# Patient Record
Sex: MALE | Race: WHITE | Employment: FULL TIME | URBAN - METROPOLITAN AREA
[De-identification: names, ages, dates, MRNs, and addresses within clinical notes are randomized per-mention and may not be internally consistent; named-entity substitution may affect disease eponyms.]

---

## 2020-05-08 ENCOUNTER — HOSPITAL ENCOUNTER (OUTPATIENT)
Age: 60
Discharge: HOME | End: 2020-05-10
Payer: COMMERCIAL

## 2020-05-08 LAB
ALBUMIN SERPL-MCNC: 4.6 G/DL (ref 3.5–5.2)
ALP BLD-CCNC: 78 U/L (ref 40–129)
ALT SERPL-CCNC: 15 U/L (ref 0–40)
ANION GAP SERPL CALCULATED.3IONS-SCNC: 12 MMOL/L (ref 7–16)
AST SERPL-CCNC: 23 U/L (ref 0–39)
BASOPHILS ABSOLUTE: 0.06 E9/L (ref 0–0.2)
BASOPHILS RELATIVE PERCENT: 1.3 % (ref 0–2)
BILIRUB SERPL-MCNC: 0.5 MG/DL (ref 0–1.2)
BUN BLDV-MCNC: 13 MG/DL (ref 6–20)
CALCIUM SERPL-MCNC: 9.6 MG/DL (ref 8.6–10.2)
CHLORIDE BLD-SCNC: 98 MMOL/L (ref 98–107)
CHOLESTEROL, TOTAL: 199 MG/DL (ref 0–199)
CO2: 28 MMOL/L (ref 22–29)
CREAT SERPL-MCNC: 1 MG/DL (ref 0.7–1.2)
EOSINOPHILS ABSOLUTE: 0.22 E9/L (ref 0.05–0.5)
EOSINOPHILS RELATIVE PERCENT: 4.7 % (ref 0–6)
GFR AFRICAN AMERICAN: >60
GFR NON-AFRICAN AMERICAN: >60 ML/MIN/1.73
GLUCOSE BLD-MCNC: 85 MG/DL (ref 74–99)
HCT VFR BLD CALC: 43.9 % (ref 37–54)
HDLC SERPL-MCNC: 93 MG/DL
HEMOGLOBIN: 14.5 G/DL (ref 12.5–16.5)
IMMATURE GRANULOCYTES #: 0 E9/L
IMMATURE GRANULOCYTES %: 0 % (ref 0–5)
LDL CHOLESTEROL CALCULATED: 88 MG/DL (ref 0–99)
LYMPHOCYTES ABSOLUTE: 1.31 E9/L (ref 1.5–4)
LYMPHOCYTES RELATIVE PERCENT: 28.2 % (ref 20–42)
MCH RBC QN AUTO: 31.7 PG (ref 26–35)
MCHC RBC AUTO-ENTMCNC: 33 % (ref 32–34.5)
MCV RBC AUTO: 95.9 FL (ref 80–99.9)
MONOCYTES ABSOLUTE: 0.55 E9/L (ref 0.1–0.95)
MONOCYTES RELATIVE PERCENT: 11.9 % (ref 2–12)
NEUTROPHILS ABSOLUTE: 2.5 E9/L (ref 1.8–7.3)
NEUTROPHILS RELATIVE PERCENT: 53.9 % (ref 43–80)
PDW BLD-RTO: 12.3 FL (ref 11.5–15)
PLATELET # BLD: 279 E9/L (ref 130–450)
PMV BLD AUTO: 11.5 FL (ref 7–12)
POTASSIUM SERPL-SCNC: 4.5 MMOL/L (ref 3.5–5)
PROSTATE SPECIFIC ANTIGEN: 2.03 NG/ML (ref 0–4)
RBC # BLD: 4.58 E12/L (ref 3.8–5.8)
SODIUM BLD-SCNC: 138 MMOL/L (ref 132–146)
TOTAL PROTEIN: 7 G/DL (ref 6.4–8.3)
TRIGL SERPL-MCNC: 88 MG/DL (ref 0–149)
TSH SERPL DL<=0.05 MIU/L-ACNC: 3.54 UIU/ML (ref 0.27–4.2)
VLDLC SERPL CALC-MCNC: 18 MG/DL
WBC # BLD: 4.6 E9/L (ref 4.5–11.5)

## 2020-05-08 PROCEDURE — 84443 ASSAY THYROID STIM HORMONE: CPT

## 2020-05-08 PROCEDURE — 80061 LIPID PANEL: CPT

## 2020-05-08 PROCEDURE — G0103 PSA SCREENING: HCPCS

## 2020-05-08 PROCEDURE — 85025 COMPLETE CBC W/AUTO DIFF WBC: CPT

## 2020-05-08 PROCEDURE — 80053 COMPREHEN METABOLIC PANEL: CPT

## 2020-10-12 ENCOUNTER — HOSPITAL ENCOUNTER (OUTPATIENT)
Age: 60
Discharge: HOME | End: 2020-10-14
Payer: COMMERCIAL

## 2020-10-12 LAB
ALBUMIN SERPL-MCNC: 4.4 G/DL (ref 3.5–5.2)
ALP BLD-CCNC: 69 U/L (ref 40–129)
ALT SERPL-CCNC: 19 U/L (ref 0–40)
ANION GAP SERPL CALCULATED.3IONS-SCNC: 11 MMOL/L (ref 7–16)
AST SERPL-CCNC: 26 U/L (ref 0–39)
BASOPHILS ABSOLUTE: 0.05 E9/L (ref 0–0.2)
BASOPHILS RELATIVE PERCENT: 1 % (ref 0–2)
BILIRUB SERPL-MCNC: 0.4 MG/DL (ref 0–1.2)
BUN BLDV-MCNC: 19 MG/DL (ref 6–20)
CALCIUM SERPL-MCNC: 9.4 MG/DL (ref 8.6–10.2)
CHLORIDE BLD-SCNC: 102 MMOL/L (ref 98–107)
CHOLESTEROL, TOTAL: 219 MG/DL (ref 0–199)
CO2: 27 MMOL/L (ref 22–29)
CREAT SERPL-MCNC: 0.9 MG/DL (ref 0.7–1.2)
EOSINOPHILS ABSOLUTE: 0.09 E9/L (ref 0.05–0.5)
EOSINOPHILS RELATIVE PERCENT: 1.8 % (ref 0–6)
GFR AFRICAN AMERICAN: >60
GFR NON-AFRICAN AMERICAN: >60 ML/MIN/1.73
GLUCOSE BLD-MCNC: 96 MG/DL (ref 74–99)
HCT VFR BLD CALC: 42.4 % (ref 37–54)
HDLC SERPL-MCNC: 92 MG/DL
HEMOGLOBIN: 14.2 G/DL (ref 12.5–16.5)
IMMATURE GRANULOCYTES #: 0.01 E9/L
IMMATURE GRANULOCYTES %: 0.2 % (ref 0–5)
LDL CHOLESTEROL CALCULATED: 110 MG/DL (ref 0–99)
LYMPHOCYTES ABSOLUTE: 1.43 E9/L (ref 1.5–4)
LYMPHOCYTES RELATIVE PERCENT: 28.3 % (ref 20–42)
MCH RBC QN AUTO: 31.5 PG (ref 26–35)
MCHC RBC AUTO-ENTMCNC: 33.5 % (ref 32–34.5)
MCV RBC AUTO: 94 FL (ref 80–99.9)
MONOCYTES ABSOLUTE: 0.58 E9/L (ref 0.1–0.95)
MONOCYTES RELATIVE PERCENT: 11.5 % (ref 2–12)
NEUTROPHILS ABSOLUTE: 2.89 E9/L (ref 1.8–7.3)
NEUTROPHILS RELATIVE PERCENT: 57.2 % (ref 43–80)
PDW BLD-RTO: 12.2 FL (ref 11.5–15)
PLATELET # BLD: 247 E9/L (ref 130–450)
PMV BLD AUTO: 10.7 FL (ref 7–12)
POTASSIUM SERPL-SCNC: 5.4 MMOL/L (ref 3.5–5)
PROSTATE SPECIFIC ANTIGEN: 2.45 NG/ML (ref 0–4)
RBC # BLD: 4.51 E12/L (ref 3.8–5.8)
SODIUM BLD-SCNC: 140 MMOL/L (ref 132–146)
TOTAL PROTEIN: 6.7 G/DL (ref 6.4–8.3)
TRIGL SERPL-MCNC: 86 MG/DL (ref 0–149)
TSH SERPL DL<=0.05 MIU/L-ACNC: 2.85 UIU/ML (ref 0.27–4.2)
VLDLC SERPL CALC-MCNC: 17 MG/DL
WBC # BLD: 5.1 E9/L (ref 4.5–11.5)

## 2020-10-12 PROCEDURE — 80061 LIPID PANEL: CPT

## 2020-10-12 PROCEDURE — 85025 COMPLETE CBC W/AUTO DIFF WBC: CPT

## 2020-10-12 PROCEDURE — 84443 ASSAY THYROID STIM HORMONE: CPT

## 2020-10-12 PROCEDURE — G0103 PSA SCREENING: HCPCS

## 2020-10-12 PROCEDURE — 80053 COMPREHEN METABOLIC PANEL: CPT

## 2021-03-17 ENCOUNTER — HOSPITAL ENCOUNTER (EMERGENCY)
Age: 61
Discharge: HOME | End: 2021-03-18
Attending: EMERGENCY MEDICINE
Payer: OTHER GOVERNMENT

## 2021-03-17 DIAGNOSIS — G50.0 TRIGEMINAL NEURALGIA: Primary | ICD-10-CM

## 2021-03-17 PROCEDURE — 99283 EMERGENCY DEPT VISIT LOW MDM: CPT

## 2021-03-17 RX ORDER — CARBAMAZEPINE 300 MG/1
300 CAPSULE, EXTENDED RELEASE ORAL 2 TIMES DAILY
COMMUNITY

## 2021-03-17 RX ORDER — CARBAMAZEPINE 300 MG/1
600 CAPSULE, EXTENDED RELEASE ORAL NIGHTLY
COMMUNITY

## 2021-03-17 RX ORDER — METHYLPREDNISOLONE 4 MG/1
4 TABLET ORAL SEE ADMIN INSTRUCTIONS
COMMUNITY
End: 2021-12-06 | Stop reason: ALTCHOICE

## 2021-03-17 RX ORDER — LEVETIRACETAM 500 MG/1
500 TABLET ORAL 2 TIMES DAILY
COMMUNITY

## 2021-03-18 VITALS
RESPIRATION RATE: 19 BRPM | OXYGEN SATURATION: 99 % | WEIGHT: 160 LBS | HEART RATE: 63 BPM | BODY MASS INDEX: 25.11 KG/M2 | TEMPERATURE: 98 F | HEIGHT: 67 IN | DIASTOLIC BLOOD PRESSURE: 82 MMHG | SYSTOLIC BLOOD PRESSURE: 150 MMHG

## 2021-03-18 PROCEDURE — 96372 THER/PROPH/DIAG INJ SC/IM: CPT

## 2021-03-18 PROCEDURE — 6360000002 HC RX W HCPCS: Performed by: EMERGENCY MEDICINE

## 2021-03-18 PROCEDURE — 6370000000 HC RX 637 (ALT 250 FOR IP): Performed by: EMERGENCY MEDICINE

## 2021-03-18 RX ORDER — OXYCODONE HYDROCHLORIDE AND ACETAMINOPHEN 5; 325 MG/1; MG/1
1 TABLET ORAL ONCE
Status: COMPLETED | OUTPATIENT
Start: 2021-03-18 | End: 2021-03-18

## 2021-03-18 RX ORDER — OXYCODONE HYDROCHLORIDE AND ACETAMINOPHEN 5; 325 MG/1; MG/1
1 TABLET ORAL EVERY 6 HOURS PRN
Qty: 10 TABLET | Refills: 0 | Status: SHIPPED | OUTPATIENT
Start: 2021-03-18 | End: 2021-03-25

## 2021-03-18 RX ORDER — DEXAMETHASONE SODIUM PHOSPHATE 10 MG/ML
10 INJECTION, SOLUTION INTRAMUSCULAR; INTRAVENOUS ONCE
Status: COMPLETED | OUTPATIENT
Start: 2021-03-18 | End: 2021-03-18

## 2021-03-18 RX ADMIN — DEXAMETHASONE SODIUM PHOSPHATE 10 MG: 10 INJECTION, SOLUTION INTRAMUSCULAR; INTRAVENOUS at 01:04

## 2021-03-18 RX ADMIN — OXYCODONE AND ACETAMINOPHEN 1 TABLET: 5; 325 TABLET ORAL at 01:06

## 2021-03-18 ASSESSMENT — ENCOUNTER SYMPTOMS
SORE THROAT: 0
VOMITING: 0
COUGH: 0
ABDOMINAL PAIN: 0
BACK PAIN: 0
EYE PAIN: 0
EYE DISCHARGE: 0
DIARRHEA: 0
SINUS PRESSURE: 0
NAUSEA: 0
WHEEZING: 0
EYE REDNESS: 0
SHORTNESS OF BREATH: 0

## 2021-03-18 ASSESSMENT — PAIN SCALES - GENERAL
PAINLEVEL_OUTOF10: 7
PAINLEVEL_OUTOF10: 8

## 2021-03-18 NOTE — ED PROVIDER NOTES
Chronic left sided trigeninal neuralgia pain no chest pain no sob no fevers no vomiting no neuro deficits. See neurologist just increased pain today    The history is provided by the patient. Review of Systems   Constitutional: Negative for chills and fever. HENT: Negative for ear pain, sinus pressure and sore throat. Eyes: Negative for pain, discharge and redness. Respiratory: Negative for cough, shortness of breath and wheezing. Cardiovascular: Negative for chest pain. Gastrointestinal: Negative for abdominal pain, diarrhea, nausea and vomiting. Genitourinary: Negative for dysuria and frequency. Musculoskeletal: Negative for arthralgias and back pain. Skin: Negative for rash and wound. Neurological: Negative for weakness and headaches. Left sided facial tenderness    Hematological: Negative for adenopathy. All other systems reviewed and are negative. Physical Exam  Vitals signs and nursing note reviewed. Constitutional:       Appearance: He is well-developed. HENT:      Head: Normocephalic and atraumatic. Jaw: No trismus. Right Ear: Hearing, tympanic membrane, ear canal and external ear normal.      Left Ear: Hearing, tympanic membrane, ear canal and external ear normal.      Nose: Nose normal.      Right Sinus: No maxillary sinus tenderness or frontal sinus tenderness. Left Sinus: No maxillary sinus tenderness or frontal sinus tenderness. Mouth/Throat:      Pharynx: Uvula midline. No uvula swelling. Eyes:      General: Lids are normal.      Conjunctiva/sclera: Conjunctivae normal.      Pupils: Pupils are equal, round, and reactive to light. Neck:      Musculoskeletal: Normal range of motion and neck supple. Cardiovascular:      Rate and Rhythm: Normal rate and regular rhythm. Heart sounds: Normal heart sounds. No murmur. Pulmonary:      Effort: Pulmonary effort is normal.      Breath sounds: Normal breath sounds.    Abdominal: General: Bowel sounds are normal.      Palpations: Abdomen is soft. Abdomen is not rigid. Tenderness: There is no abdominal tenderness. There is no guarding or rebound. Skin:     General: Skin is warm and dry. Findings: No abrasion or rash. Neurological:      Mental Status: He is alert and oriented to person, place, and time. GCS: GCS eye subscore is 4. GCS verbal subscore is 5. GCS motor subscore is 6. Cranial Nerves: No cranial nerve deficit. Sensory: No sensory deficit. Coordination: Coordination normal.      Gait: Gait normal.          Procedures     MDM         --------------------------------------------- PAST HISTORY ---------------------------------------------  Past Medical History:  has no past medical history on file. Past Surgical History:  has no past surgical history on file. Social History:  reports that he has never smoked. He has never used smokeless tobacco. He reports current alcohol use. He reports that he does not use drugs. Family History: family history is not on file. The patients home medications have been reviewed. Allergies: Patient has no known allergies. -------------------------------------------------- RESULTS -------------------------------------------------  No results found for this visit on 03/17/21. No orders to display       ------------------------- NURSING NOTES AND VITALS REVIEWED ---------------------------   The nursing notes within the ED encounter and vital signs as below have been reviewed.    BP (!) 143/73   Pulse 56   Temp 98.5 °F (36.9 °C) (Oral)   Resp 17   Ht 5' 7\" (1.702 m)   Wt 160 lb (72.6 kg)   SpO2 99%   BMI 25.06 kg/m²   Oxygen Saturation Interpretation: Normal      ------------------------------------------ PROGRESS NOTES ------------------------------------------   I have spoken with the patient and discussed todays results, in addition to providing specific details for the plan of care and counseling regarding the diagnosis and prognosis. Their questions are answered at this time and they are agreeable with the plan.      --------------------------------- ADDITIONAL PROVIDER NOTES ---------------------------------          This patient is stable for discharge. I have shared the specific conditions for return, as well as the importance of follow-up.           --------------------------------- IMPRESSION AND DISPOSITION ---------------------------------    IMPRESSION  1.  Trigeminal neuralgia Stable       DISPOSITION  Disposition: Discharge to home  Patient condition is stable              Brenna Camacho MD  03/18/21 [id number]

## 2021-03-18 NOTE — ED NOTES
Discharge instructions and medications reviewed, patient verbalized understanding     Epifanio Galeano RN  03/18/21 [id number]

## 2021-11-24 ENCOUNTER — HOSPITAL ENCOUNTER (OUTPATIENT)
Dept: CT IMAGING | Age: 61
Discharge: HOME | End: 2021-11-24
Payer: OTHER GOVERNMENT

## 2021-11-24 DIAGNOSIS — R59.0 ENLARGED LYMPH NODES IN ARMPIT: ICD-10-CM

## 2021-11-24 PROCEDURE — 6360000004 HC RX CONTRAST MEDICATION: Performed by: RADIOLOGY

## 2021-11-24 PROCEDURE — 71260 CT THORAX DX C+: CPT

## 2021-11-24 RX ADMIN — IOPAMIDOL 75 ML: 755 INJECTION, SOLUTION INTRAVENOUS at 12:04

## 2021-12-06 ENCOUNTER — OFFICE VISIT (OUTPATIENT)
Dept: SURGERY | Age: 61
End: 2021-12-06
Payer: OTHER GOVERNMENT

## 2021-12-06 ENCOUNTER — TELEPHONE (OUTPATIENT)
Dept: SURGERY | Age: 61
End: 2021-12-06

## 2021-12-06 VITALS
BODY MASS INDEX: 25.01 KG/M2 | WEIGHT: 165 LBS | HEART RATE: 64 BPM | DIASTOLIC BLOOD PRESSURE: 78 MMHG | HEIGHT: 68 IN | SYSTOLIC BLOOD PRESSURE: 129 MMHG | TEMPERATURE: 98.1 F

## 2021-12-06 DIAGNOSIS — R59.0 AXILLARY LYMPHADENOPATHY: Primary | ICD-10-CM

## 2021-12-06 PROCEDURE — 99204 OFFICE O/P NEW MOD 45 MIN: CPT | Performed by: SURGERY

## 2021-12-06 RX ORDER — RIMEGEPANT SULFATE 75 MG/75MG
75 TABLET, ORALLY DISINTEGRATING ORAL DAILY PRN
COMMUNITY

## 2021-12-06 RX ORDER — ERENUMAB-AOOE 140 MG/ML
INJECTION, SOLUTION SUBCUTANEOUS
COMMUNITY
Start: 2021-08-31

## 2021-12-06 RX ORDER — LORAZEPAM 0.5 MG/1
TABLET ORAL
COMMUNITY

## 2021-12-06 RX ORDER — SODIUM CHLORIDE 0.9 % (FLUSH) 0.9 %
10 SYRINGE (ML) INJECTION PRN
Status: CANCELLED | OUTPATIENT
Start: 2021-12-06

## 2021-12-06 RX ORDER — ROSUVASTATIN CALCIUM 5 MG/1
TABLET, COATED ORAL
COMMUNITY
Start: 2021-11-06

## 2021-12-06 RX ORDER — SODIUM CHLORIDE 9 MG/ML
25 INJECTION, SOLUTION INTRAVENOUS PRN
Status: CANCELLED | OUTPATIENT
Start: 2021-12-06

## 2021-12-06 RX ORDER — SODIUM CHLORIDE 0.9 % (FLUSH) 0.9 %
10 SYRINGE (ML) INJECTION EVERY 12 HOURS SCHEDULED
Status: CANCELLED | OUTPATIENT
Start: 2021-12-06

## 2021-12-06 NOTE — TELEPHONE ENCOUNTER
Prior Authorization Form:      DEMOGRAPHICS:                     Patient Name:  Penny Tinajero  Patient :  1960            Insurance:  Payor: Obey Jackson / Plan: [employer] [address] / Product Type: *No Product type* /   Insurance ID Number:    Payor/Plan Subscr  Sex Relation Sub. Ins. ID Effective Group Num   1.  13 [facility] 1960 Male Self [id number] 3/17/21 [id number]                                   [id number]         DIAGNOSIS & PROCEDURE:                       Procedure/Operation: Excision left axillary lymph node            CPT Code: 07009    Diagnosis:  Axillary lymphadenopathy     ICD10 Code: R59.0    Location:  [facility]    Surgeon:  Lara Hinds INFORMATION:                          Date: 21    Time: TBD              Anesthesia:  MAC/TIVA                                                       Status:  Outpatient        Special Comments:  Lymphoma Protocol        Electronically signed by Yaya Berry RN on 2021 at 11:33 AM

## 2021-12-06 NOTE — PROGRESS NOTES
General Surgery History and Physical  [facility]    Patient's Name/Date of Birth: Rogerio Valdovinos / 1960    Date: December 6, 2021     Surgeon: Isabelle Guerin M.D.    PCP: Cyndee Tomas MD     Chief Complaint: lymphadenopath    HPI:   Rogerio Valdovinos is a 62 y.o. male who presents for evaluation of soft tissue neoplasm of the left axilla. Timing is constant, radiation to left axilla, alleviated by none and started 6 months ago and severity is 7/10. NO drainage, no pain. Denies similar in the past. No fever, chills. Denies previous at the same site. Would like to have removed. Patient had CT scan which showed significant lymphadenopathy in the left axilla of 3 separate lymph nodes measuring from 11 up to 27 mm. Also with a 9 mm left thyroid lesion. Denies any B symptoms denies any unintentional weight loss. Denies any family history of leukemia lymphoma. Patient with a history of trigeminal neuralgia as well as shingles in the left side about 6 months ago when he first identified the lesion. Shingles had resolved and not having any resolution of the lymph node enlargement. There is no problem list on file for this patient. History reviewed. No pertinent past medical history. History reviewed. No pertinent surgical history. No Known Allergies    The patient has a family history that is negative for severe cardiovascular or respiratory issues, negative for reaction to anesthesia. Time spent reviewing past medical, surgical, social and family history, vitals, nursing assessment and images. No changes from above documented history.     Social History     Socioeconomic History    Marital status:      Spouse name: Not on file    Number of children: Not on file    Years of education: Not on file    Highest education level: Not on file   Occupational History    Not on file   Tobacco Use    Smoking status: Never Smoker    Smokeless tobacco: Never Used Substance and Sexual Activity    Alcohol use: Yes     Comment: Occ.  Drug use: Never    Sexual activity: Not on file   Other Topics Concern    Not on file   Social History Narrative    Not on file     Social Determinants of Health     Financial Resource Strain:     Difficulty of Paying Living Expenses: Not on file   Food Insecurity:     Worried About Running Out of Food in the Last Year: Not on file    Nir of Food in the Last Year: Not on file   Transportation Needs:     Lack of Transportation (Medical): Not on file    Lack of Transportation (Non-Medical): Not on file   Physical Activity:     Days of Exercise per Week: Not on file    Minutes of Exercise per Session: Not on file   Stress:     Feeling of Stress : Not on file   Social Connections:     Frequency of Communication with Friends and Family: Not on file    Frequency of Social Gatherings with Friends and Family: Not on file    Attends Protestant Services: Not on file    Active Member of [address] [place] [address] or Organizations: Not on file    Attends Club or Organization Meetings: Not on file    Marital Status: Not on file   Intimate Partner Violence:     Fear of Current or Ex-Partner: Not on file    Emotionally Abused: Not on file    Physically Abused: Not on file    Sexually Abused: Not on file   Housing Stability:     Unable to Pay for Housing in the Last Year: Not on file    Number of [place] in the Last Year: Not on file    Unstable Housing in the Last Year: Not on file           Review of Systems  A complete 10 system review was performed and are otherwise negative unless mentioned in the above HPI. Specific negatives are listed below but may not include all those reviewed.     General ROS: negative obtundation, AMS  ENT ROS: negative rhinorrhea, epistaxis  Allergy and Immunology ROS: negative itchy/watery eyes or nasal congestion  Hematological and Lymphatic ROS: negative spontaneous bleeding or bruising  Endocrine ROS: negative lethargy, mood swings, palpitations or polydipsia/polyuria  Respiratory ROS: negative sputum changes, stridor, tachypnea or wheezing  Cardiovascular ROS: negative for - loss of consciousness, murmur or orthopnea  Gastrointestinal ROS: negative for - hematochezia or hematemesis  Genito-Urinary ROS: negative for -  genital discharge or hematuria  Musculoskeletal ROS: negative for - focal weakness, gangrene  Psych/Neuro ROS: negative for - visual or auditory hallucinations, suicidal ideation    Physical exam:   /78   Pulse 64   Temp 98.1 °F (36.7 °C) (Temporal)   Ht 5' 8\" (1.727 m)   Wt 165 lb (74.8 kg)   BMI 25.09 kg/m²   General appearance:  NAD, appears stated age  Head: NCAT, PERRLA, EOMI, red conjunctiva  Neck: supple, no masses, trachea midline  Lungs: Equal chest rise bilateral, no retractions, no wheezing  Heart: Reg rate  Abdomen: soft, nontender  Skin; soft tissue mass 4 cm located left axilla, mobile, none tender, no drainage  Gu: no cva tenderness  Extremities: atraumatic, no focal motor deficits, no open wounds  Psych: No tremor, visual hallucinations      Radiology: I reviewed relevant abdominal imaging from this admission and that available in the EMR including CT chest  Impression   Enlarged left axillary lymph nodes.  Malignancy like lymphoma or leukemia is   a consideration.  Tissue sampling or PET-CT scan is recommended.       Scarring and apical pleural thickening and pleural plaques bilaterally.       3 mm subpleural nodule in the right upper lobe.  Consider surveillance   according to Fleischner society guidelines.       9 mm left thyroid nodule.  Consider ultrasonographic surveillance. Assessment:  Cynthia Cervantes is a 62 y.o. male with axillary lymphadenopathy on the left concerns for malignancy, pain at the site with swelling  There is no problem list on file for this patient.         Plan:  OR for excision left axillary lymph node  Discussed the risk, benefits and alternatives of surgery including wound infections, bleeding, scar, seroma, hematoma and recurrence of the mass or similar in other locations and the risks of general anesthetic including MI, CVA, sudden death or reactions to anesthetic medications. The patient understands the risks and alternatives and the possibility of converting to an open procedure. All questions were answered to the patient's satisfaction and they freely signed the consent.        Merlin Burkitt, MD  10:01 AM  12/6/2021

## 2021-12-09 RX ORDER — SODIUM CHLORIDE, SODIUM LACTATE, POTASSIUM CHLORIDE, CALCIUM CHLORIDE 600; 310; 30; 20 MG/100ML; MG/100ML; MG/100ML; MG/100ML
INJECTION, SOLUTION INTRAVENOUS CONTINUOUS
Status: CANCELLED | OUTPATIENT
Start: 2021-12-09

## 2021-12-10 ENCOUNTER — HOSPITAL ENCOUNTER (OUTPATIENT)
Dept: PREADMISSION TESTING | Age: 61
Discharge: HOME | End: 2021-12-10
Payer: OTHER GOVERNMENT

## 2021-12-10 VITALS
SYSTOLIC BLOOD PRESSURE: 125 MMHG | HEIGHT: 68 IN | BODY MASS INDEX: 25.01 KG/M2 | OXYGEN SATURATION: 100 % | WEIGHT: 165 LBS | HEART RATE: 58 BPM | DIASTOLIC BLOOD PRESSURE: 80 MMHG | TEMPERATURE: 97.4 F | RESPIRATION RATE: 16 BRPM

## 2021-12-10 DIAGNOSIS — Z01.818 PRE-OP TESTING: Primary | ICD-10-CM

## 2021-12-10 LAB
EKG ATRIAL RATE: 54 BPM
EKG P AXIS: 74 DEGREES
EKG P-R INTERVAL: 168 MS
EKG Q-T INTERVAL: 412 MS
EKG QRS DURATION: 96 MS
EKG QTC CALCULATION (BAZETT): 390 MS
EKG R AXIS: 30 DEGREES
EKG T AXIS: 49 DEGREES
EKG VENTRICULAR RATE: 54 BPM
HCT VFR BLD CALC: 42.8 % (ref 37–54)
HEMOGLOBIN: 14.8 G/DL (ref 12.5–16.5)
MCH RBC QN AUTO: 32.1 PG (ref 26–35)
MCHC RBC AUTO-ENTMCNC: 34.6 % (ref 32–34.5)
MCV RBC AUTO: 92.8 FL (ref 80–99.9)
PDW BLD-RTO: 11.7 FL (ref 11.5–15)
PLATELET # BLD: 231 E9/L (ref 130–450)
PMV BLD AUTO: 10.5 FL (ref 7–12)
RBC # BLD: 4.61 E12/L (ref 3.8–5.8)
WBC # BLD: 4.8 E9/L (ref 4.5–11.5)

## 2021-12-10 PROCEDURE — 85027 COMPLETE CBC AUTOMATED: CPT

## 2021-12-10 PROCEDURE — 93005 ELECTROCARDIOGRAM TRACING: CPT | Performed by: ANESTHESIOLOGY

## 2021-12-10 PROCEDURE — 36415 COLL VENOUS BLD VENIPUNCTURE: CPT

## 2021-12-10 NOTE — PROGRESS NOTES
[facility]                                                                                                                    PRE OP INSTRUCTIONS FOR  Nazia Anderson        Date: 12/10/2021    Date of surgery: 12/13/21   Arrival Time: Hospital will call you between 5pm and 7pm with your final arrival time for surgery    1. Do not eat or drink anything after midnight prior to surgery. This includes no water, chewing gum, mints or ice chips. 2. Take the following medications with a small sip of water on the morning of Surgery: carbatral keprra       3. Aspirin, Ibuprofen, Advil, Naproxen, Vitamin E and other Anti-inflammatory products should be stopped  before surgery  as directed by your physician. Take Tylenol only unless instructed otherwise by your surgeon. 4. Do not smoke,use illicit drugs and do not drink any alcoholic beverages 24 hours prior to surgery. 5. You may brush your teeth the morning of surgery. DO NOT SWALLOW WATER    6. You MUST make arrangements for a responsible adult to take you home after your surgery. You will not be allowed to leave alone or drive yourself home. It is strongly suggested someone stay with you the first 24 hrs. Your surgery will be cancelled if you do not have a ride home. 7. Please wear simple, loose fitting clothing to the hospital.  Elena Blanc not bring valuables (money, credit cards, checkbooks, etc.) Do not wear any makeup (including no eye makeup) or nail polish on your fingers or toes. 8. DO NOT wear any jewelry or piercings on day of surgery. All body piercing jewelry must be removed. 9. Shower the night before surgery with _x__Antibacterial soap /LUCIO WIPES________      10.  Notify your Surgeon if you develop any illness between now and surgery time, cough, cold, fever, sore throat, nausea, vomiting, etc.  Please notify your surgeon if you experience dizziness, shortness of breath or blurred vision between now & the time of

## 2021-12-12 ENCOUNTER — ANESTHESIA EVENT (OUTPATIENT)
Dept: OPERATING ROOM | Age: 61
End: 2021-12-12
Payer: OTHER GOVERNMENT

## 2021-12-13 ENCOUNTER — ANESTHESIA (OUTPATIENT)
Dept: OPERATING ROOM | Age: 61
End: 2021-12-13
Payer: OTHER GOVERNMENT

## 2021-12-13 ENCOUNTER — HOSPITAL ENCOUNTER (OUTPATIENT)
Age: 61
Setting detail: OUTPATIENT SURGERY
Discharge: HOME | End: 2021-12-13
Attending: SURGERY | Admitting: SURGERY
Payer: OTHER GOVERNMENT

## 2021-12-13 VITALS
RESPIRATION RATE: 16 BRPM | OXYGEN SATURATION: 98 % | DIASTOLIC BLOOD PRESSURE: 82 MMHG | TEMPERATURE: 96.1 F | HEART RATE: 55 BPM | SYSTOLIC BLOOD PRESSURE: 125 MMHG

## 2021-12-13 VITALS
DIASTOLIC BLOOD PRESSURE: 83 MMHG | SYSTOLIC BLOOD PRESSURE: 117 MMHG | RESPIRATION RATE: 16 BRPM | OXYGEN SATURATION: 99 %

## 2021-12-13 DIAGNOSIS — G89.18 POSTOPERATIVE PAIN: Primary | ICD-10-CM

## 2021-12-13 PROCEDURE — 88305 TISSUE EXAM BY PATHOLOGIST: CPT

## 2021-12-13 PROCEDURE — 7100000011 HC PHASE II RECOVERY - ADDTL 15 MIN: Performed by: SURGERY

## 2021-12-13 PROCEDURE — 2720000010 HC SURG SUPPLY STERILE: Performed by: SURGERY

## 2021-12-13 PROCEDURE — 2500000003 HC RX 250 WO HCPCS

## 2021-12-13 PROCEDURE — 2580000003 HC RX 258

## 2021-12-13 PROCEDURE — 6360000002 HC RX W HCPCS: Performed by: SURGERY

## 2021-12-13 PROCEDURE — 6360000002 HC RX W HCPCS

## 2021-12-13 PROCEDURE — 3600000012 HC SURGERY LEVEL 2 ADDTL 15MIN: Performed by: SURGERY

## 2021-12-13 PROCEDURE — 88342 IMHCHEM/IMCYTCHM 1ST ANTB: CPT

## 2021-12-13 PROCEDURE — 88184 FLOWCYTOMETRY/ TC 1 MARKER: CPT

## 2021-12-13 PROCEDURE — 2500000003 HC RX 250 WO HCPCS: Performed by: SURGERY

## 2021-12-13 PROCEDURE — 7100000010 HC PHASE II RECOVERY - FIRST 15 MIN: Performed by: SURGERY

## 2021-12-13 PROCEDURE — 3700000000 HC ANESTHESIA ATTENDED CARE: Performed by: SURGERY

## 2021-12-13 PROCEDURE — 38525 BIOPSY/REMOVAL LYMPH NODES: CPT | Performed by: SURGERY

## 2021-12-13 PROCEDURE — 88185 FLOWCYTOMETRY/TC ADD-ON: CPT

## 2021-12-13 PROCEDURE — 3700000001 HC ADD 15 MINUTES (ANESTHESIA): Performed by: SURGERY

## 2021-12-13 PROCEDURE — 88341 IMHCHEM/IMCYTCHM EA ADD ANTB: CPT

## 2021-12-13 PROCEDURE — 2709999900 HC NON-CHARGEABLE SUPPLY: Performed by: SURGERY

## 2021-12-13 PROCEDURE — 3600000002 HC SURGERY LEVEL 2 BASE: Performed by: SURGERY

## 2021-12-13 PROCEDURE — 88360 TUMOR IMMUNOHISTOCHEM/MANUAL: CPT

## 2021-12-13 PROCEDURE — 2580000003 HC RX 258: Performed by: ANESTHESIOLOGY

## 2021-12-13 RX ORDER — IBUPROFEN 800 MG/1
800 TABLET ORAL EVERY 6 HOURS PRN
Qty: 20 TABLET | Refills: 0 | Status: SHIPPED | OUTPATIENT
Start: 2021-12-13 | End: 2022-01-24

## 2021-12-13 RX ORDER — FENTANYL CITRATE 50 UG/ML
INJECTION, SOLUTION INTRAMUSCULAR; INTRAVENOUS PRN
Status: DISCONTINUED | OUTPATIENT
Start: 2021-12-13 | End: 2021-12-13 | Stop reason: SDUPTHER

## 2021-12-13 RX ORDER — PROPOFOL 10 MG/ML
INJECTION, EMULSION INTRAVENOUS PRN
Status: DISCONTINUED | OUTPATIENT
Start: 2021-12-13 | End: 2021-12-13 | Stop reason: SDUPTHER

## 2021-12-13 RX ORDER — LIDOCAINE HYDROCHLORIDE 10 MG/ML
INJECTION, SOLUTION INFILTRATION; PERINEURAL PRN
Status: DISCONTINUED | OUTPATIENT
Start: 2021-12-13 | End: 2021-12-13 | Stop reason: ALTCHOICE

## 2021-12-13 RX ORDER — LABETALOL HYDROCHLORIDE 5 MG/ML
5 INJECTION, SOLUTION INTRAVENOUS EVERY 10 MIN PRN
Status: DISCONTINUED | OUTPATIENT
Start: 2021-12-13 | End: 2021-12-13 | Stop reason: HOSPADM

## 2021-12-13 RX ORDER — HYDROCODONE BITARTRATE AND ACETAMINOPHEN 5; 325 MG/1; MG/1
1 TABLET ORAL EVERY 6 HOURS PRN
Qty: 10 TABLET | Refills: 0 | Status: SHIPPED | OUTPATIENT
Start: 2021-12-13 | End: 2021-12-16

## 2021-12-13 RX ORDER — SODIUM CHLORIDE, SODIUM LACTATE, POTASSIUM CHLORIDE, CALCIUM CHLORIDE 600; 310; 30; 20 MG/100ML; MG/100ML; MG/100ML; MG/100ML
INJECTION, SOLUTION INTRAVENOUS CONTINUOUS
Status: DISCONTINUED | OUTPATIENT
Start: 2021-12-13 | End: 2021-12-13 | Stop reason: HOSPADM

## 2021-12-13 RX ORDER — BUPIVACAINE HYDROCHLORIDE AND EPINEPHRINE 2.5; 5 MG/ML; UG/ML
INJECTION, SOLUTION EPIDURAL; INFILTRATION; INTRACAUDAL; PERINEURAL PRN
Status: DISCONTINUED | OUTPATIENT
Start: 2021-12-13 | End: 2021-12-13 | Stop reason: ALTCHOICE

## 2021-12-13 RX ORDER — LIDOCAINE HYDROCHLORIDE 20 MG/ML
INJECTION, SOLUTION INTRAVENOUS PRN
Status: DISCONTINUED | OUTPATIENT
Start: 2021-12-13 | End: 2021-12-13 | Stop reason: SDUPTHER

## 2021-12-13 RX ORDER — SODIUM CHLORIDE 9 MG/ML
25 INJECTION, SOLUTION INTRAVENOUS PRN
Status: DISCONTINUED | OUTPATIENT
Start: 2021-12-13 | End: 2021-12-13 | Stop reason: HOSPADM

## 2021-12-13 RX ORDER — HYDROCODONE BITARTRATE AND ACETAMINOPHEN 5; 325 MG/1; MG/1
1 TABLET ORAL
Status: DISCONTINUED | OUTPATIENT
Start: 2021-12-13 | End: 2021-12-13 | Stop reason: HOSPADM

## 2021-12-13 RX ORDER — SODIUM CHLORIDE 0.9 % (FLUSH) 0.9 %
10 SYRINGE (ML) INJECTION EVERY 12 HOURS SCHEDULED
Status: DISCONTINUED | OUTPATIENT
Start: 2021-12-13 | End: 2021-12-13 | Stop reason: HOSPADM

## 2021-12-13 RX ORDER — MIDAZOLAM HYDROCHLORIDE 1 MG/ML
INJECTION INTRAMUSCULAR; INTRAVENOUS PRN
Status: DISCONTINUED | OUTPATIENT
Start: 2021-12-13 | End: 2021-12-13 | Stop reason: SDUPTHER

## 2021-12-13 RX ORDER — PROCHLORPERAZINE EDISYLATE 5 MG/ML
5 INJECTION INTRAMUSCULAR; INTRAVENOUS
Status: DISCONTINUED | OUTPATIENT
Start: 2021-12-13 | End: 2021-12-13 | Stop reason: HOSPADM

## 2021-12-13 RX ORDER — SODIUM CHLORIDE, SODIUM LACTATE, POTASSIUM CHLORIDE, CALCIUM CHLORIDE 600; 310; 30; 20 MG/100ML; MG/100ML; MG/100ML; MG/100ML
INJECTION, SOLUTION INTRAVENOUS CONTINUOUS PRN
Status: DISCONTINUED | OUTPATIENT
Start: 2021-12-13 | End: 2021-12-13 | Stop reason: SDUPTHER

## 2021-12-13 RX ORDER — FENTANYL CITRATE 50 UG/ML
25 INJECTION, SOLUTION INTRAMUSCULAR; INTRAVENOUS EVERY 5 MIN PRN
Status: DISCONTINUED | OUTPATIENT
Start: 2021-12-13 | End: 2021-12-13 | Stop reason: HOSPADM

## 2021-12-13 RX ORDER — ONDANSETRON 2 MG/ML
INJECTION INTRAMUSCULAR; INTRAVENOUS PRN
Status: DISCONTINUED | OUTPATIENT
Start: 2021-12-13 | End: 2021-12-13 | Stop reason: SDUPTHER

## 2021-12-13 RX ORDER — KETAMINE HYDROCHLORIDE 50 MG/ML
INJECTION, SOLUTION, CONCENTRATE INTRAMUSCULAR; INTRAVENOUS PRN
Status: DISCONTINUED | OUTPATIENT
Start: 2021-12-13 | End: 2021-12-13 | Stop reason: SDUPTHER

## 2021-12-13 RX ORDER — MEPERIDINE HYDROCHLORIDE 25 MG/ML
12.5 INJECTION INTRAMUSCULAR; INTRAVENOUS; SUBCUTANEOUS EVERY 5 MIN PRN
Status: DISCONTINUED | OUTPATIENT
Start: 2021-12-13 | End: 2021-12-13 | Stop reason: HOSPADM

## 2021-12-13 RX ORDER — DIPHENHYDRAMINE HYDROCHLORIDE 50 MG/ML
12.5 INJECTION INTRAMUSCULAR; INTRAVENOUS
Status: DISCONTINUED | OUTPATIENT
Start: 2021-12-13 | End: 2021-12-13 | Stop reason: HOSPADM

## 2021-12-13 RX ORDER — HYDRALAZINE HYDROCHLORIDE 20 MG/ML
5 INJECTION INTRAMUSCULAR; INTRAVENOUS EVERY 10 MIN PRN
Status: DISCONTINUED | OUTPATIENT
Start: 2021-12-13 | End: 2021-12-13 | Stop reason: HOSPADM

## 2021-12-13 RX ORDER — SODIUM CHLORIDE 0.9 % (FLUSH) 0.9 %
10 SYRINGE (ML) INJECTION PRN
Status: DISCONTINUED | OUTPATIENT
Start: 2021-12-13 | End: 2021-12-13 | Stop reason: HOSPADM

## 2021-12-13 RX ADMIN — PROPOFOL INJECTABLE EMULSION 75 MCG/KG/MIN: 10 INJECTION, EMULSION INTRAVENOUS at 07:02

## 2021-12-13 RX ADMIN — KETAMINE HYDROCHLORIDE 30 MG: 50 INJECTION INTRAMUSCULAR; INTRAVENOUS at 07:01

## 2021-12-13 RX ADMIN — FENTANYL CITRATE 50 MCG: 50 INJECTION, SOLUTION INTRAMUSCULAR; INTRAVENOUS at 07:05

## 2021-12-13 RX ADMIN — KETAMINE HYDROCHLORIDE 10 MG: 50 INJECTION INTRAMUSCULAR; INTRAVENOUS at 07:17

## 2021-12-13 RX ADMIN — ONDANSETRON 4 MG: 2 INJECTION INTRAMUSCULAR; INTRAVENOUS at 07:24

## 2021-12-13 RX ADMIN — SODIUM CHLORIDE, POTASSIUM CHLORIDE, SODIUM LACTATE AND CALCIUM CHLORIDE: 600; 310; 30; 20 INJECTION, SOLUTION INTRAVENOUS at 06:57

## 2021-12-13 RX ADMIN — Medication 2000 MG: at 07:02

## 2021-12-13 RX ADMIN — LIDOCAINE HYDROCHLORIDE 20 MG: 20 INJECTION, SOLUTION INTRAVENOUS at 07:01

## 2021-12-13 RX ADMIN — MIDAZOLAM 1 MG: 1 INJECTION INTRAMUSCULAR; INTRAVENOUS at 06:58

## 2021-12-13 RX ADMIN — PROPOFOL INJECTABLE EMULSION 100 MG: 10 INJECTION, EMULSION INTRAVENOUS at 07:01

## 2021-12-13 RX ADMIN — SODIUM CHLORIDE, POTASSIUM CHLORIDE, SODIUM LACTATE AND CALCIUM CHLORIDE: 600; 310; 30; 20 INJECTION, SOLUTION INTRAVENOUS at 06:00

## 2021-12-13 RX ADMIN — FENTANYL CITRATE 50 MCG: 50 INJECTION, SOLUTION INTRAMUSCULAR; INTRAVENOUS at 07:01

## 2021-12-13 ASSESSMENT — PULMONARY FUNCTION TESTS
PIF_VALUE: 1
PIF_VALUE: 3
PIF_VALUE: 2
PIF_VALUE: 1
PIF_VALUE: 0
PIF_VALUE: 1
PIF_VALUE: 0
PIF_VALUE: 1

## 2021-12-13 ASSESSMENT — PAIN SCALES - GENERAL
PAINLEVEL_OUTOF10: 0
PAINLEVEL_OUTOF10: 0

## 2021-12-13 ASSESSMENT — PAIN - FUNCTIONAL ASSESSMENT: PAIN_FUNCTIONAL_ASSESSMENT: 0-10

## 2021-12-13 ASSESSMENT — LIFESTYLE VARIABLES: SMOKING_STATUS: 0

## 2021-12-13 NOTE — ANESTHESIA POSTPROCEDURE EVALUATION
Department of Anesthesiology  Postprocedure Note    Patient: Sandy Powers  MRN: [id number]  YOB: 1960  Date of evaluation: 12/13/2021  Time:  7:53 AM     Procedure Summary     Date: 12/13/21 Room / Location: [address] [place] [address] / [address] [place] [address]    Anesthesia Start: 1836 Anesthesia Stop: 6313    Procedure: EXCISION LEFT AXILLARY LYMPH NODE  **LYMPHOMA PROTOCOL** (Left Axilla) Diagnosis: (AXILLARY LYMPHADENOPATHY)    Surgeons: Eulalio Moncada MD Responsible Provider: Mariana Tatum DO    Anesthesia Type: MAC ASA Status: 2          Anesthesia Type: MAC    Loly Phase I: Loly Score: 10    Loly Phase II:      Last vitals: Reviewed and per EMR flowsheets.        Anesthesia Post Evaluation    Patient location during evaluation: bedside  Patient participation: complete - patient participated  Level of consciousness: awake  Pain score: 3  Airway patency: patent  Nausea & Vomiting: no vomiting and no nausea  Complications: no  Cardiovascular status: hemodynamically stable  Respiratory status: acceptable  Hydration status: stable

## 2021-12-13 NOTE — H&P
General Surgery History and Physical  [facility]    Patient's Name/Date of Birth: Tip Burleson / 1960    Date: December 13, 2021     Surgeon: Oksana Blank M.D.    PCP: Wilfrido Ybarra MD     Chief Complaint: lymphadenopath    HPI:   Tip Burleson is a 62 y.o. male who presents for evaluation of soft tissue neoplasm of the left axilla. Timing is constant, radiation to left axilla, alleviated by none and started 6 months ago and severity is 7/10. NO drainage, no pain. Denies similar in the past. No fever, chills. Denies previous at the same site. Would like to have removed. Patient had CT scan which showed significant lymphadenopathy in the left axilla of 3 separate lymph nodes measuring from 11 up to 27 mm. Also with a 9 mm left thyroid lesion. Denies any B symptoms denies any unintentional weight loss. Denies any family history of leukemia lymphoma. Patient with a history of trigeminal neuralgia as well as shingles in the left side about 6 months ago when he first identified the lesion. Shingles had resolved and not having any resolution of the lymph node enlargement. There is no problem list on file for this patient. Past Medical History:   Diagnosis Date    Shingles 07/2021    left arm    Trigeminal neuralgia of left side of face 2009       Past Surgical History:   Procedure Laterality Date    TMJ ARTHROSCOPY      VASCULAR SURGERY Left 11/2019    multivascular decompression of trigeminal nerve       Allergies   Allergen Reactions    Cat Hair Extract Other (See Comments)     congestion       The patient has a family history that is negative for severe cardiovascular or respiratory issues, negative for reaction to anesthesia. Time spent reviewing past medical, surgical, social and family history, vitals, nursing assessment and images. No changes from above documented history.     Social History     Socioeconomic History    Marital status:      Spouse name: Not on file    Number of children: Not on file    Years of education: Not on file    Highest education level: Not on file   Occupational History    Not on file   Tobacco Use    Smoking status: Never Smoker    Smokeless tobacco: Never Used   Vaping Use    Vaping Use: Never used   Substance and Sexual Activity    Alcohol use: Yes     Comment: Occ.  Drug use: Never    Sexual activity: Not on file   Other Topics Concern    Not on file   Social History Narrative    Not on file     Social Determinants of Health     Financial Resource Strain:     Difficulty of Paying Living Expenses: Not on file   Food Insecurity:     Worried About Running Out of Food in the Last Year: Not on file    Nir of Food in the Last Year: Not on file   Transportation Needs:     Lack of Transportation (Medical): Not on file    Lack of Transportation (Non-Medical): Not on file   Physical Activity:     Days of Exercise per Week: Not on file    Minutes of Exercise per Session: Not on file   Stress:     Feeling of Stress : Not on file   Social Connections:     Frequency of Communication with Friends and Family: Not on file    Frequency of Social Gatherings with Friends and Family: Not on file    Attends Evangelical Services: Not on file    Active Member of [address] [place] [address] [employer] or Organizations: Not on file    Attends Club or Organization Meetings: Not on file    Marital Status: Not on file   Intimate Partner Violence:     Fear of Current or Ex-Partner: Not on file    Emotionally Abused: Not on file    Physically Abused: Not on file    Sexually Abused: Not on file   Housing Stability:     Unable to Pay for Housing in the Last Year: Not on file    Number of [place] in the Last Year: Not on file    Unstable Housing in the Last Year: Not on file           Review of Systems  A complete 10 system review was performed and are otherwise negative unless mentioned in the above HPI.  Specific negatives are listed below but may not include all those reviewed. General ROS: negative obtundation, AMS  ENT ROS: negative rhinorrhea, epistaxis  Allergy and Immunology ROS: negative itchy/watery eyes or nasal congestion  Hematological and Lymphatic ROS: negative spontaneous bleeding or bruising  Endocrine ROS: negative  lethargy, mood swings, palpitations or polydipsia/polyuria  Respiratory ROS: negative sputum changes, stridor, tachypnea or wheezing  Cardiovascular ROS: negative for - loss of consciousness, murmur or orthopnea  Gastrointestinal ROS: negative for - hematochezia or hematemesis  Genito-Urinary ROS: negative for -  genital discharge or hematuria  Musculoskeletal ROS: negative for - focal weakness, gangrene  Psych/Neuro ROS: negative for - visual or auditory hallucinations, suicidal ideation    Physical exam:   /75   Pulse 56   Temp 97.3 °F (36.3 °C) (Infrared)   Resp 16   SpO2 97%   General appearance:  NAD, appears stated age  Head: NCAT, PERRLA, EOMI, red conjunctiva  Neck: supple, no masses, trachea midline  Lungs: Equal chest rise bilateral, no retractions, no wheezing  Heart: Reg rate  Abdomen: soft, nontender  Skin; soft tissue mass 4 cm located left axilla, mobile, none tender, no drainage  Gu: no cva tenderness  Extremities: atraumatic, no focal motor deficits, no open wounds  Psych: No tremor, visual hallucinations      Radiology: I reviewed relevant abdominal imaging from this admission and that available in the EMR including CT chest  Impression   Enlarged left axillary lymph nodes.  Malignancy like lymphoma or leukemia is   a consideration.  Tissue sampling or PET-CT scan is recommended.       Scarring and apical pleural thickening and pleural plaques bilaterally.       3 mm subpleural nodule in the right upper lobe.  Consider surveillance   according to Fleischner society guidelines.       9 mm left thyroid nodule.  Consider ultrasonographic surveillance.          Assessment:  Josiah Jordan is a 62 y.o. male with axillary lymphadenopathy on the left concerns for malignancy, pain at the site with swelling  There is no problem list on file for this patient. Plan:  OR for excision left axillary lymph node  Discussed the risk, benefits and alternatives of surgery including wound infections, bleeding, scar, seroma, hematoma and recurrence of the mass or similar in other locations and the risks of general anesthetic including MI, CVA, sudden death or reactions to anesthetic medications. The patient understands the risks and alternatives and the possibility of converting to an open procedure. All questions were answered to the patient's satisfaction and they freely signed the consent.        Renee Cintron MD  6:53 AM  12/13/2021

## 2021-12-13 NOTE — ANESTHESIA PRE PROCEDURE
Department of Anesthesiology  Preprocedure Note       Name:  Bee Wilkerson   Age:  62 y.o.  :  1960                                          MRN:  [id number]         Date:  2021      Surgeon: Pastor Pearce):  Sandra Benjamin MD    Procedure: Procedure(s):  EXCISION LEFT AXILLARY LYMPH NODE  **LYMPHOMA PROTOCOL**    Medications prior to admission:   Prior to Admission medications    Medication Sig Start Date End Date Taking? Authorizing Provider   Multiple Vitamins-Minerals (CENTRUM SILVER 50+MEN) TABS Take by mouth    Historical Provider, MD   rosuvastatin (CRESTOR) 5 MG tablet take 1 tablet by mouth once daily 21   Historical Provider, MD   LORazepam (ATIVAN) 0.5 MG tablet lorazepam 0.5 mg tablet    Historical Provider, MD   AIMOVIG 140 MG/ML SOAJ inject 1 milliliter ( 140 milligrams ) subcutaneously Every Month. ..  (REFER TO PRESCRIPTION NOTES).  21   Historical Provider, MD   Rimegepant Sulfate (NURTEC) 75 MG TBDP Take 75 mg by mouth daily as needed     Historical Provider, MD   carBAMazepine (CARBATROL) 300 MG extended release capsule Take 300 mg by mouth 2 times daily Morning and afternoon    Historical Provider, MD   carBAMazepine (CARBATROL) 300 MG extended release capsule Take 600 mg by mouth nightly    Historical Provider, MD   levETIRAcetam (KEPPRA) 500 MG tablet Take 500 mg by mouth 2 times daily Morning and night    Historical Provider, MD   Lacosamide (VIMPAT PO) Take 100 mg by mouth 2 times daily Midday and bedtime    Historical Provider, MD       Current medications:    Current Facility-Administered Medications   Medication Dose Route Frequency Provider Last Rate Last Admin    lactated ringers infusion   IntraVENous Continuous Dhara Echavarria MD 50 mL/hr at 21 0600 New Bag at 21 0600    0.9 % sodium chloride infusion  25 mL IntraVENous PRN Sandra Benjamin MD        ceFAZolin (ANCEF) 2000 mg in sterile water 20 mL IV syringe  2,000 mg IntraVENous On Call to OR Hollie Montoya MD        sodium chloride flush 0.9 % injection 10 mL  10 mL IntraVENous 2 times per day Hollie Montoya MD        sodium chloride flush 0.9 % injection 10 mL  10 mL IntraVENous PRN Hollie Montoya MD           Allergies: Allergies   Allergen Reactions    Cat Hair Extract Other (See Comments)     congestion       Problem List:  There is no problem list on file for this patient. Past Medical History:        Diagnosis Date    Shingles 07/2021    left arm    Trigeminal neuralgia of left side of face 2009       Past Surgical History:        Procedure Laterality Date    TMJ ARTHROSCOPY      VASCULAR SURGERY Left 11/2019    multivascular decompression of trigeminal nerve       Social History:    Social History     Tobacco Use    Smoking status: Never Smoker    Smokeless tobacco: Never Used   Substance Use Topics    Alcohol use: Yes     Comment: Occ.                                 Counseling given: Not Answered      Vital Signs (Current):   Vitals:    12/13/21 0545   BP: 131/75   Pulse: 56   Resp: 16   Temp: 97.3 °F (36.3 °C)   TempSrc: Infrared   SpO2: 97%                                              BP Readings from Last 3 Encounters:   12/13/21 131/75   12/10/21 125/80   12/06/21 129/78       NPO Status: Time of last liquid consumption: 2100                        Time of last solid consumption: 1900                        Date of last liquid consumption: 12/12/21                        Date of last solid food consumption: 12/12/21    BMI:   Wt Readings from Last 3 Encounters:   12/10/21 165 lb (74.8 kg)   12/06/21 165 lb (74.8 kg)   03/17/21 160 lb (72.6 kg)     There is no height or weight on file to calculate BMI.    CBC:   Lab Results   Component Value Date    WBC 4.8 12/10/2021    RBC 4.61 12/10/2021    HGB 14.8 12/10/2021    HCT 42.8 12/10/2021    MCV 92.8 12/10/2021    RDW 11.7 12/10/2021     12/10/2021       CMP:   Lab Results   Component Value Date     04/30/2021    K 4.9 04/30/2021    CL 99 04/30/2021    CO2 27 04/30/2021    BUN 9 04/30/2021    CREATININE 0.8 04/30/2021    GFRAA >60 04/30/2021    LABGLOM >60 04/30/2021    GLUCOSE 84 04/30/2021    GLUCOSE 82 11/18/2011    PROT 7.0 04/30/2021    CALCIUM 9.5 04/30/2021    BILITOT 0.3 04/30/2021    ALKPHOS 69 04/30/2021    AST 18 04/30/2021    ALT 11 04/30/2021       POC Tests: No results for input(s): POCGLU, POCNA, POCK, POCCL, POCBUN, POCHEMO, POCHCT in the last 72 hours.     Coags: No results found for: PROTIME, INR, APTT    HCG (If Applicable): No results found for: PREGTESTUR, PREGSERUM, HCG, HCGQUANT     ABGs: No results found for: PHART, PO2ART, OOC4AGV, AIQ8BVS, BEART, E3HJIAVT     Type & Screen (If Applicable):  No results found for: LABABO, LABRH    Drug/Infectious Status (If Applicable):  No results found for: HIV, HEPCAB    COVID-19 Screening (If Applicable): No results found for: COVID19        Anesthesia Evaluation  Patient summary reviewed and Nursing notes reviewed no history of anesthetic complications:   Airway: Mallampati: II  TM distance: >3 FB   Neck ROM: full  Mouth opening: > = 3 FB Dental:          Pulmonary:normal exam  breath sounds clear to auscultation  (+) asthma (Allergic to cats: denies recent exacerbations or inhaler usage): allergic asthma,     (-) not a current smoker                           Cardiovascular:Negative CV ROS  Exercise tolerance: good (>4 METS),   (+) murmur (Grade I), hyperlipidemia      ECG reviewed  Rhythm: regular  Rate: normal           Beta Blocker:  Not on Beta Blocker      ROS comment: Sinus bradycardia  Otherwise normal ECG  No previous ECGs available     Neuro/Psych:   (+) neuromuscular disease (Trigeminal neuralgia left face: last steroid usage in July (7 day course)):, depression/anxiety             GI/Hepatic/Renal: Neg GI/Hepatic/Renal ROS            Endo/Other: Negative Endo/Other ROS             Pt had no PAT visit       Abdominal:         (-) obese

## 2021-12-15 LAB
Lab: NORMAL
REPORT: NORMAL
THIS TEST SENT TO: NORMAL

## 2022-01-10 ENCOUNTER — OFFICE VISIT (OUTPATIENT)
Dept: SURGERY | Age: 62
End: 2022-01-10

## 2022-01-10 ENCOUNTER — TELEPHONE (OUTPATIENT)
Dept: SURGERY | Age: 62
End: 2022-01-10

## 2022-01-10 VITALS
TEMPERATURE: 98.3 F | BODY MASS INDEX: 25.01 KG/M2 | WEIGHT: 165 LBS | HEIGHT: 68 IN | HEART RATE: 59 BPM | DIASTOLIC BLOOD PRESSURE: 76 MMHG | SYSTOLIC BLOOD PRESSURE: 144 MMHG

## 2022-01-10 DIAGNOSIS — C83.34 DIFFUSE LARGE B-CELL LYMPHOMA OF LYMPH NODES OF AXILLA (HCC): ICD-10-CM

## 2022-01-10 DIAGNOSIS — R59.0 AXILLARY LYMPHADENOPATHY: Primary | ICD-10-CM

## 2022-01-10 PROCEDURE — 99024 POSTOP FOLLOW-UP VISIT: CPT | Performed by: SURGERY

## 2022-01-10 NOTE — PROGRESS NOTES
General Surgery Office Note  Sherl Lesch, MD, MS    Patient's Name/Date of Birth: Tenzin Michaud / 1960    Date: January 10, 2022     Surgeon: Raina Moody MD    Chief Complaint: s/p excision soft tissue neoplasm of the left axilla      There is no problem list on file for this patient. Subjective: Doing well, no pain, incision well healed, no seroma, no new complaints    Objective:  BP (!) 144/76   Pulse 59   Temp 98.3 °F (36.8 °C) (Temporal)   Ht 5' 8\" (1.727 m)   Wt 165 lb (74.8 kg)   BMI 25.09 kg/m²   Labs:  No results for input(s): WBC, HGB, HCT in the last 72 hours. Invalid input(s): PLR  Lab Results   Component Value Date    CREATININE 0.8 04/30/2021    BUN 9 04/30/2021     04/30/2021    K 4.9 04/30/2021    CL 99 04/30/2021    CO2 27 04/30/2021     No results for input(s): LIPASE, AMYLASE in the last 72 hours. General appearance: AA, NAD  HEENT: NCAT, PERRLA, EOMI  Lungs: Clear, equal rise bilateral  Heart: Reg  Abdomen: soft, nondistended, nontender  Skin: No lesions, incisions well healed left axilla  Psych: No distress, conversive, no hallucinations  : No ulcers or lesions  Rectal: No bleeding    A complete 10 system review was performed and are otherwise negative unless mentioned in the above HPI. Specific negatives are listed below but may not include all those reviewed.     General ROS: negative obtundation, AMS  ENT ROS: negative rhinorrhea, epistaxis  Allergy and Immunology ROS: negative itchy/watery eyes or nasal congestion  Hematological and Lymphatic ROS: negative spontaneous bleeding or bruising  Endocrine ROS: negative  lethargy, mood swings, palpitations or polydipsia/polyuria  Respiratory ROS: negative sputum changes, stridor, tachypnea or wheezing  Cardiovascular ROS: negative for - loss of consciousness, murmur or orthopnea  Gastrointestinal ROS: negative for - hematochezia or hematemesis  Genito-Urinary ROS: negative for - genital discharge or hematuria  Musculoskeletal ROS: negative for - focal weakness, gangrene  Psych/Neuro ROS: negative for - visual or auditory hallucinations, suicidal ideation      Time spent reviewing past medical, surgical, social and family history, vitals, nursing assessment and images.         Pathology: B cell dominant in 58%, suggestive of lymphoma, final path not resulted    Assessment/Plan:  Vega Wright is a 62 y.o. male 2 weeks s/p excision soft tissue neoplasm of the left axilla, suggestive of lymphoma    Doing  well  Resume activity gradually   Follow up as needed  Pathology reviewed and copy provided      Physician Signature: Electronically signed by Dr. Maximiliano Gresham  [phone] (p)  1/10/2022  8:56 AM

## 2022-01-10 NOTE — TELEPHONE ENCOUNTER
Referral sent electronically and faxed to Dr. Franci León' office. Office to contact patient to schedule.   Electronically signed by Tamara Beck RN on 1/10/2022 at 3:03 PM

## 2022-01-18 ENCOUNTER — HOSPITAL ENCOUNTER (OUTPATIENT)
Dept: NON INVASIVE DIAGNOSTICS | Age: 62
Discharge: HOME | End: 2022-01-18
Payer: OTHER GOVERNMENT

## 2022-01-18 LAB
LV EF: 68 %
LVEF MODALITY: NORMAL

## 2022-01-18 PROCEDURE — 93306 TTE W/DOPPLER COMPLETE: CPT

## 2022-01-24 ENCOUNTER — TELEPHONE (OUTPATIENT)
Dept: CASE MANAGEMENT | Age: 62
End: 2022-01-24

## 2022-01-24 ENCOUNTER — HOSPITAL ENCOUNTER (OUTPATIENT)
Dept: INFUSION THERAPY | Age: 62
Discharge: HOME | End: 2022-01-24
Payer: OTHER GOVERNMENT

## 2022-01-24 ENCOUNTER — OFFICE VISIT (OUTPATIENT)
Dept: ONCOLOGY | Age: 62
End: 2022-01-24
Payer: OTHER GOVERNMENT

## 2022-01-24 VITALS
WEIGHT: 167.7 LBS | SYSTOLIC BLOOD PRESSURE: 137 MMHG | DIASTOLIC BLOOD PRESSURE: 80 MMHG | HEIGHT: 68 IN | HEART RATE: 54 BPM | TEMPERATURE: 97.1 F | OXYGEN SATURATION: 100 % | BODY MASS INDEX: 25.42 KG/M2

## 2022-01-24 DIAGNOSIS — C82.94 FOLLICULAR LYMPHOMA OF LYMPH NODES OF UPPER EXTREMITY, UNSPECIFIED GRADE (HCC): ICD-10-CM

## 2022-01-24 DIAGNOSIS — C82.94 FOLLICULAR LYMPHOMA OF LYMPH NODES OF UPPER EXTREMITY, UNSPECIFIED GRADE (HCC): Primary | ICD-10-CM

## 2022-01-24 LAB
ALBUMIN SERPL-MCNC: 4.8 G/DL (ref 3.5–5.2)
ALP BLD-CCNC: 75 U/L (ref 40–129)
ALT SERPL-CCNC: 20 U/L (ref 0–40)
ANION GAP SERPL CALCULATED.3IONS-SCNC: 9 MMOL/L (ref 7–16)
AST SERPL-CCNC: 27 U/L (ref 0–39)
BASOPHILS ABSOLUTE: 0.04 E9/L (ref 0–0.2)
BASOPHILS RELATIVE PERCENT: 0.8 % (ref 0–2)
BILIRUB SERPL-MCNC: 0.4 MG/DL (ref 0–1.2)
BUN BLDV-MCNC: 15 MG/DL (ref 6–23)
CALCIUM SERPL-MCNC: 9.5 MG/DL (ref 8.6–10.2)
CHLORIDE BLD-SCNC: 102 MMOL/L (ref 98–107)
CO2: 27 MMOL/L (ref 22–29)
CREAT SERPL-MCNC: 0.8 MG/DL (ref 0.7–1.2)
EOSINOPHILS ABSOLUTE: 0.08 E9/L (ref 0.05–0.5)
EOSINOPHILS RELATIVE PERCENT: 1.6 % (ref 0–6)
GFR AFRICAN AMERICAN: >60
GFR NON-AFRICAN AMERICAN: >60 ML/MIN/1.73
GLUCOSE BLD-MCNC: 97 MG/DL (ref 74–99)
HCT VFR BLD CALC: 42.7 % (ref 37–54)
HEMOGLOBIN: 14.4 G/DL (ref 12.5–16.5)
IMMATURE GRANULOCYTES #: 0.01 E9/L
IMMATURE GRANULOCYTES %: 0.2 % (ref 0–5)
LACTATE DEHYDROGENASE: 197 U/L (ref 135–225)
LYMPHOCYTES ABSOLUTE: 1.31 E9/L (ref 1.5–4)
LYMPHOCYTES RELATIVE PERCENT: 26.8 % (ref 20–42)
MCH RBC QN AUTO: 32 PG (ref 26–35)
MCHC RBC AUTO-ENTMCNC: 33.7 % (ref 32–34.5)
MCV RBC AUTO: 94.9 FL (ref 80–99.9)
MONOCYTES ABSOLUTE: 0.37 E9/L (ref 0.1–0.95)
MONOCYTES RELATIVE PERCENT: 7.6 % (ref 2–12)
NEUTROPHILS ABSOLUTE: 3.07 E9/L (ref 1.8–7.3)
NEUTROPHILS RELATIVE PERCENT: 63 % (ref 43–80)
PDW BLD-RTO: 12.3 FL (ref 11.5–15)
PLATELET # BLD: 219 E9/L (ref 130–450)
PMV BLD AUTO: 10.3 FL (ref 7–12)
POTASSIUM SERPL-SCNC: 4.6 MMOL/L (ref 3.5–5)
RBC # BLD: 4.5 E12/L (ref 3.8–5.8)
SODIUM BLD-SCNC: 138 MMOL/L (ref 132–146)
URIC ACID, SERUM: 3 MG/DL (ref 3.4–7)
WBC # BLD: 4.9 E9/L (ref 4.5–11.5)

## 2022-01-24 PROCEDURE — 83615 LACTATE (LD) (LDH) ENZYME: CPT

## 2022-01-24 PROCEDURE — 80074 ACUTE HEPATITIS PANEL: CPT

## 2022-01-24 PROCEDURE — 86703 HIV-1/HIV-2 1 RESULT ANTBDY: CPT

## 2022-01-24 PROCEDURE — 85025 COMPLETE CBC W/AUTO DIFF WBC: CPT

## 2022-01-24 PROCEDURE — 84165 PROTEIN E-PHORESIS SERUM: CPT

## 2022-01-24 PROCEDURE — 80053 COMPREHEN METABOLIC PANEL: CPT

## 2022-01-24 PROCEDURE — 36415 COLL VENOUS BLD VENIPUNCTURE: CPT

## 2022-01-24 PROCEDURE — 99205 OFFICE O/P NEW HI 60 MIN: CPT | Performed by: INTERNAL MEDICINE

## 2022-01-24 PROCEDURE — 84550 ASSAY OF BLOOD/URIC ACID: CPT

## 2022-01-24 PROCEDURE — 99214 OFFICE O/P EST MOD 30 MIN: CPT | Performed by: INTERNAL MEDICINE

## 2022-01-24 PROCEDURE — 82784 ASSAY IGA/IGD/IGG/IGM EACH: CPT

## 2022-01-24 PROCEDURE — 99214 OFFICE O/P EST MOD 30 MIN: CPT

## 2022-01-24 RX ORDER — ZINC 25 MG
1 TABLET ORAL DAILY
COMMUNITY

## 2022-01-24 RX ORDER — VITAMIN E 268 MG
400 CAPSULE ORAL DAILY
COMMUNITY

## 2022-01-24 RX ORDER — MULTIVIT WITH MINERALS/LUTEIN
1000 TABLET ORAL DAILY
COMMUNITY

## 2022-01-24 NOTE — TELEPHONE ENCOUNTER
Spoke with Carlos Alberto Weston, [facility] pathology dept, regarding slide comparison of patient's left axilla LN biopsy 12/13/2021 with Dr. Khai Tran and colonoscopy 12/20/2021 with Dr. Dawne Severe per Dr. Jose Adams request.  Patient's colonoscopy pathology is scanned into the media tab for reference. States that she will reach out to her contact at Dr. Calixto Morrow office and that it may take a week or so to slides and compare. Informed her that patient isn't scheduled for follow up until 2/14/2022 with Dr. January Andrade and that would be acceptable. Pam Mirza, BSW, RN, OCN  Oncology Nurse Navigator

## 2022-01-24 NOTE — PROGRESS NOTES
[id number] [facility]  [place] [address]  Dept: [facility]: [phone]  Attending Consult Note      Reason for Visit:   Non-Hodgkin lymphoma. Referring Physician:  Peggy Montez MD    PCP:  Cinthia Mosquera MD    History of Present Illness:     Mr. Mary Stein is a pleasant 62year old gentleman with a PMHx of trigeminal neuralgia who has been referred to us for follicular lymphoma. He initially had shingles in 7/2021 and felt a lymph node in his axillary area. He does not note progression of size or increase in number. Denies any weight loss, night sweats and low grade fevers. He had CT scan showed lymphadenopathy in the left axilla measuring 11-27 mm. 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 CD10 negative, [facility] has sent for [address] and molecular sequencing. Lab work shows normal kidney function, no anemia. He also had a colonoscopy done by Dr. 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 Review of Systems;  CONSTITUTIONAL: No fever, chills. Good appetite and energy level. ENMT: Eyes: No diplopia; Nose: No epistaxis. Mouth: No sore throat. RESPIRATORY: No hemoptysis, shortness of breath, cough. CARDIOVASCULAR: No chest pain, palpitations. GASTROINTESTINAL: No nausea/vomiting, abdominal pain, diarrhea/constipation. GENITOURINARY: No dysuria, urinary frequency, hematuria. NEURO: No syncope, presyncope, headache. Remainder:  ROS NEGATIVE    Past Medical History:      Diagnosis Date    Cancer ([facility] [address]     Headache     Hyperlipidemia     Shingles 07/2021    left arm    Trigeminal neuralgia of left side of face 2009     There is no problem list on file for this patient.        Past Surgical History:      Procedure Laterality Date    LYMPH NODE BIOPSY Left 12/13/2021    EXCISION LEFT AXILLARY LYMPH NODE  **LYMPHOMA PROTOCOL** performed by Renee Cintron MD at [address] [place] [address],[facility] TMJ ARTHROSCOPY      VASCULAR SURGERY Left 11/2019    multivascular decompression of trigeminal nerve       Family History:  Family History   Problem Relation Age of Onset    High Blood Pressure Mother     Other Mother         CHF    Heart Attack Father     Heart Disease Father     Diabetes Father        Medications:  Reviewed and reconciled. Social History:  Social History     Socioeconomic History    Marital status:      Spouse name: Not on file    Number of children: Not on file    Years of education: Not on file    Highest education level: Not on file   Occupational History    Not on file   Tobacco Use    Smoking status: Never Smoker    Smokeless tobacco: Never Used   Vaping Use    Vaping Use: Never used   Substance and Sexual Activity    Alcohol use: Yes     Alcohol/week: 1.0 standard drink     Types: 1 Glasses of wine per week    Drug use: Never    Sexual activity: Not on file   Other Topics Concern    Not on file   Social History Narrative    Not on file     Social Determinants of Health     Financial Resource Strain:     Difficulty of Paying Living Expenses: Not on file   Food Insecurity:     Worried About Running Out of Food in the Last Year: Not on file    Nir of Food in the Last Year: Not on file   Transportation Needs:     Lack of Transportation (Medical): Not on file    Lack of Transportation (Non-Medical):  Not on file   Physical Activity:     Days of Exercise per Week: Not on file    Minutes of Exercise per Session: Not on file   Stress:     Feeling of Stress : Not on file   Social Connections:     Frequency of Communication with Friends and Family: Not on file    Frequency of Social Gatherings with Friends and Family: Not on file    Attends Yazidism Services: Not on file  Active Member of Clubs or Organizations: Not on file    Attends Club or Organization Meetings: Not on file    Marital Status: Not on file   Intimate Partner Violence:     Fear of Current or Ex-Partner: Not on file    Emotionally Abused: Not on file    Physically Abused: Not on file    Sexually Abused: Not on file   Housing Stability:     Unable to Pay for Housing in the Last Year: Not on file    Number of [place] in the Last Year: Not on file    Unstable Housing in the Last Year: Not on file       Allergies: Allergies   Allergen Reactions    Cat Hair Extract Other (See Comments)     congestion       Physical Exam:  /80   Pulse 54   Temp 97.1 °F (36.2 °C)   Ht 5' 8\" (1.727 m)   Wt 167 lb 11.2 oz (76.1 kg)   SpO2 100%   BMI 25.50 kg/m²   GENERAL: Alert, oriented x 3, not in acute distress. HEENT: PERRLA; EOMI. Oropharynx clear. NECK: Supple. No palpable cervical or supraclavicular lymphadenopathy. LUNGS: Good air entry bilaterally. No wheezing, crackles or rhonchi. CARDIOVASCULAR: Regular rate. No murmurs, rubs or gallops. ABDOMEN: Soft. Non-tender, non-distended. Positive bowel sounds. EXTREMITIES: Without clubbing, cyanosis, or edema. NEUROLOGIC: No focal deficits. LYMPHATICS: Palpable left axillary lymphadenopathy. ECOG PS 1         Impression/Plan:     Mr. Carlo Oviedo is a pleasant 62year old gentleman with a PMHx of trigeminal neuralgia who has been referred to us for follicular lymphoma. He initially had shingles in 7/2021 and felt a lymph node in his axillary area. He does not note progression of size or increase in number. Denies any weight loss, night sweats. low grade fevers. He had CT scan showed lymphadenopathy in the left axilla measuring 11-27 mm. 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 CD10 negative, [facility] has sent for [facility] and molecular sequencing. Lab work shows normal kidney function, no anemia.  He also had a colonoscopy done by Dr. 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 Mr. Wang Martinez has biopsy confirmed Follicular lymphoma grade 1-2. He has a low-grade disease, discussed his diagnosis and prognosis, he denies B symptoms and lab work so far is within normal limits. We will obtain a  PET scan for staging, SPEP, quantitative immunoglobulins, uric acid, HIV and viral hepatitis testing, LDH. We will also obtain the slides from his colonoscopy with Dr. Kyung Jean for review by  the pathologist at [facility] and comparison to the lymph node biopsy, to evaluate if both are related to the same process. RTC in about 3 weeks. Thank you for allowing us to participate in the care of Mr. Wang Martinez.     Magdi Sanchez MD   HEMATOLOGY/MEDICAL [address] [address] [address]  [address] [address]  Dept: [place]: [phone]

## 2022-01-24 NOTE — TELEPHONE ENCOUNTER
Met with patient and his wife, Toney Tan, during initial consultation appointment with Dr. Jennifer Givens at [facility] for his recent Follicular lymphoma diagnosis per Dr. Kelsey Hancock. Introduced myself and explained my role with patients receiving treatment at our cancer center. Patient was friendly and receptive. Completed nursing assessment for the center including medication review and medical, social, and surgical histories. Family is supportive and assist with needs. Patient denies any hot flashes, night sweats, or weight loss. States that he felt the left axilla lump after recovering from left arm shingles 7/2022. PCP ordered CT Chest 11/24/2021. LN excision completed on 12/13/2021 and colonoscopy with Dr. Lali Terry on 12/20/2021. Records obtained and scanned into patient chart for physician review. Also, discussed the ancillary staff available at the center and described their roles. Patient and family decline any referral needs at this time. Dr. Jennifer Givens to review recent test results and next steps for blood work and staging scan per NCCN guidelines. Verbalizes understanding. Provided with written literature of Patient Resource Booklet: Lymphoma, Multiple Myeloma, and Leukemia, [place] pamphlet, and Eating and Drinking tips during Treatment per dietician. Provided patient with my contact information, office hours, and encouragement to call me with questions or concerns. Patient verbalizes understanding and appreciative of nurse navigator visit. Emotional support provided and greater than 30 minutes spent with patient. Nurse navigator will continue to follow for treatment plan. Pam Jaimes, BSW, RN, OCN  Oncology Nurse Navigator

## 2022-01-24 NOTE — PROGRESS NOTES
Berry Barally  1960 62 y.o. Referring Physician: Dr. Vernell Cao    PCP: Mary Stafford MD    Vitals:    22 1058   BP: 137/80   Pulse: 54   Temp: 97.1 °F (36.2 °C)   SpO2: 100%        Wt Readings from Last 3 Encounters:   22 167 lb 11.2 oz (76.1 kg)   01/10/22 165 lb (74.8 kg)   12/10/21 165 lb (74.8 kg)        Body mass index is 25.5 kg/m². Chief Complaint:   Chief Complaint   Patient presents with    New Patient         Cancer Staging  No matching staging information was found for the patient. Prior Radiation Therapy? NO    Concurrent Chemo/radiation? NO    Prior Chemotherapy? NO    Prior Hormonal Therapy? NO    Head and Neck Cancer? No, patient does NOT have HN cancer. LMP: na    Age at first Menses: na    : na    Para: na      Current Outpatient Medications:     vitamin E 1000 units capsule, Take 1,000 Units by mouth daily, Disp: , Rfl:     vitamin E 400 UNIT capsule, Take 400 Units by mouth daily, Disp: , Rfl:     Zinc 25 MG TABS, Take 1 tablet by mouth daily, Disp: , Rfl:     Multiple Vitamins-Minerals (CENTRUM SILVER 50+MEN) TABS, Take by mouth, Disp: , Rfl:     rosuvastatin (CRESTOR) 5 MG tablet, take 1 tablet by mouth once daily, Disp: , Rfl:     LORazepam (ATIVAN) 0.5 MG tablet, lorazepam 0.5 mg tablet, Disp: , Rfl:     Rimegepant Sulfate (NURTEC) 75 MG TBDP, Take 75 mg by mouth daily as needed , Disp: , Rfl:     carBAMazepine (CARBATROL) 300 MG extended release capsule, Take 300 mg by mouth 2 times daily Morning and afternoon, Disp: , Rfl:     carBAMazepine (CARBATROL) 300 MG extended release capsule, Take 600 mg by mouth nightly, Disp: , Rfl:     levETIRAcetam (KEPPRA) 500 MG tablet, Take 500 mg by mouth 2 times daily Morning and night, Disp: , Rfl:     Lacosamide (VIMPAT PO), Take 100 mg by mouth 2 times daily Midday and bedtime, Disp: , Rfl:     AIMOVIG 140 MG/ML SOAJ, inject 1 milliliter ( 140 milligrams ) subcutaneously Every Month. ..  (REFER TO PRESCRIPTION NOTES). , Disp: , Rfl:        Past Medical History:   Diagnosis Date    Cancer ([facility] [address]     Headache     Hyperlipidemia     Shingles 07/2021    left arm    Trigeminal neuralgia of left side of face 2009       Past Surgical History:   Procedure Laterality Date    LYMPH NODE BIOPSY Left 12/13/2021    EXCISION LEFT AXILLARY LYMPH NODE  **LYMPHOMA PROTOCOL** performed by Alie Preston MD at [address] [place] [address],[facility] TMJ ARTHROSCOPY      VASCULAR SURGERY Left 11/2019    multivascular decompression of trigeminal nerve       Family History   Problem Relation Age of Onset    High Blood Pressure Mother     Other Mother         CHF    Heart Attack Father     Heart Disease Father     Diabetes Father        Social History     Socioeconomic History    Marital status:      Spouse name: Not on file    Number of children: Not on file    Years of education: Not on file    Highest education level: Not on file   Occupational History    Not on file   Tobacco Use    Smoking status: Never Smoker    Smokeless tobacco: Never Used   Vaping Use    Vaping Use: Never used   Substance and Sexual Activity    Alcohol use: Yes     Alcohol/week: 1.0 standard drink     Types: 1 Glasses of wine per week    Drug use: Never    Sexual activity: Not on file   Other Topics Concern    Not on file   Social History Narrative    Not on file     Social Determinants of Health     Financial Resource Strain:     Difficulty of Paying Living Expenses: Not on file   Food Insecurity:     Worried About Running Out of Food in the Last Year: Not on file    Nir of Food in the Last Year: Not on file   Transportation Needs:     Lack of Transportation (Medical): Not on file    Lack of Transportation (Non-Medical):  Not on file   Physical Activity:     Days of Exercise per Week: Not on file    Minutes of Exercise per Session: Not on file   Stress:     Feeling of Stress : Not on file   Social Connections:     Frequency of Communication with Friends and Family: Not on file    Frequency of Social Gatherings with Friends and Family: Not on file    Attends Episcopal Services: Not on file    Active Member of Clubs or Organizations: Not on file    Attends Club or Organization Meetings: Not on file    Marital Status: Not on file   Intimate Partner Violence:     Fear of Current or Ex-Partner: Not on file    Emotionally Abused: Not on file    Physically Abused: Not on file    Sexually Abused: Not on file   Housing Stability:     Unable to Pay for Housing in the Last Year: Not on file    Number of [place] in the Last Year: Not on file    Unstable Housing in the Last Year: Not on file     Occupation:  from home  Retired:  NO    Pacemaker/Defibulator/ICD:  No    Mediport: No    FALLS RISK SCREENING ASSESSMENT    Instructions:  Assess the patient and [place] the appropriate indicators that are present for fall risk identification. Total the numbers circled and assign a fall risk score from Table 2.  Reassess patient at a minimum every 12 weeks or with status change. Assessment   Date  1/24/2022     1. Mental Ability: confusion/cognitively impaired No - 0       2. Elimination Issues: incontinence, frequency No - 0       3. Ambulatory: use of assistive devices (walker, cane, off-loading devices), attached to equipment (IV pole, oxygen) No - 0     4. Sensory Limitations: dizziness, vertigo, impaired vision Yes - 3       5. Age Less than 65 years - 0       6. Medication: diuretics, strong analgesics, hypnotics, sedatives, antihypertensive agents   Yes - 3   7. Falls:  recent history of falls within the last 3 months (not to include slipping or tripping)   No - 0   TOTAL 6    If score of 4 or greater was education given? Yes       TABLE 2   Risk Score Risk Level Plan of Care   0-3 Little or  No Risk 1. Provide assistance as indicated for ambulation activities  2.   Reorient confused/cognitively impaired patient  3. Call-light/bell within patient's reach  4. Chair/bed in low position, stretcher/bed with siderails up except when performing patient care activities  5. Educate patient/family/caregiver on falls prevention  6.  Reassess in 12 weeks or with any noted change in patient condition which places them at a risk for a fall   4-6 Moderate Risk 1. Provide assistance as indicated for ambulation activities  2. Reorient confused/cognitively impaired patient  3. Call-light/bell within patient's reach  4. Chair/bed in low position, stretcher/bed with siderails up except when performing patient care activities  5. Educate patient/family/caregiver on falls prevention  6. Falls risk precaution (Yellow sticker Level II) placed on patient chart   7 or   Higher High Risk 1. Place patient in easily observable treatment room  2. Patient attended at all times by family member or staff  3. Provide assistance as indicated for ambulation activities  4. Reorient confused/cognitively impaired patient  5. Call-light/bell within patient's reach  6. Chair/bed in low position, stretcher/bed with siderails up except when performing patient care activities  7. Educate patient/family/caregiver on falls prevention  8. Falls risk precaution (Yellow sticker Level III) placed on patient chart           MALNUTRITION RISK SCREENING ASSESSMENT    Instructions:  Assess the patient and enter the appropriate indicators that are present for nutrition risk identification. Total the numbers entered and assign a risk score. Follow the appropriate action for total score listed below. Assessment   Date  1/24/2022     1. Have you lost weight without trying? 0- No     2. Have you been eating poorly because of a decreased appetite? 0- No   3. Do you have a diagnosis of head and neck cancer?       0- No                                                                                    TOTAL 0        Score of 0-1: No

## 2022-01-25 LAB
HAV IGM SER IA-ACNC: NORMAL
HEPATITIS B CORE IGM ANTIBODY: NORMAL
HEPATITIS B SURFACE ANTIGEN INTERPRETATION: NORMAL
HEPATITIS C ANTIBODY INTERPRETATION: NORMAL
HIV-1 AND HIV-2 ANTIBODIES: NORMAL

## 2022-01-26 LAB
ALBUMIN SERPL-MCNC: 3.9 G/DL (ref 3.5–4.7)
ALPHA-1-GLOBULIN: 0.3 G/DL (ref 0.2–0.4)
ALPHA-2-GLOBULIN: 0.7 G/DL (ref 0.5–1)
BETA GLOBULIN: 0.9 G/DL (ref 0.8–1.3)
ELECTROPHORESIS: NORMAL
GAMMA GLOBULIN: 1.2 G/DL (ref 0.7–1.6)
IGA: 289 MG/DL (ref 70–400)
IGG: 1002 MG/DL (ref 700–1600)
IGM: 69 MG/DL (ref 40–230)
TOTAL PROTEIN: 7 G/DL (ref 6.4–8.3)

## 2022-02-07 ENCOUNTER — HOSPITAL ENCOUNTER (OUTPATIENT)
Dept: NUCLEAR MEDICINE | Age: 62
Discharge: HOME | End: 2022-02-07
Payer: OTHER GOVERNMENT

## 2022-02-07 DIAGNOSIS — C82.94 FOLLICULAR LYMPHOMA OF LYMPH NODES OF UPPER EXTREMITY, UNSPECIFIED GRADE (HCC): ICD-10-CM

## 2022-02-07 PROCEDURE — 78815 PET IMAGE W/CT SKULL-THIGH: CPT

## 2022-02-07 PROCEDURE — 3430000000 HC RX DIAGNOSTIC RADIOPHARMACEUTICAL: Performed by: RADIOLOGY

## 2022-02-07 PROCEDURE — A9552 F18 FDG: HCPCS | Performed by: RADIOLOGY

## 2022-02-07 RX ORDER — FLUDEOXYGLUCOSE F 18 200 MCI/ML
15 INJECTION, SOLUTION INTRAVENOUS
Status: COMPLETED | OUTPATIENT
Start: 2022-02-07 | End: 2022-02-07

## 2022-02-07 RX ADMIN — FLUDEOXYGLUCOSE F 18 15 MILLICURIE: 200 INJECTION, SOLUTION INTRAVENOUS at 09:04

## 2022-02-14 ENCOUNTER — OFFICE VISIT (OUTPATIENT)
Dept: ONCOLOGY | Age: 62
End: 2022-02-14
Payer: OTHER GOVERNMENT

## 2022-02-14 VITALS
WEIGHT: 161.2 LBS | BODY MASS INDEX: 24.43 KG/M2 | HEART RATE: 59 BPM | OXYGEN SATURATION: 99 % | HEIGHT: 68 IN | TEMPERATURE: 97.1 F | DIASTOLIC BLOOD PRESSURE: 81 MMHG | SYSTOLIC BLOOD PRESSURE: 133 MMHG

## 2022-02-14 DIAGNOSIS — E04.1 THYROID NODULE: Primary | ICD-10-CM

## 2022-02-14 DIAGNOSIS — C82.94 FOLLICULAR LYMPHOMA OF LYMPH NODES OF UPPER EXTREMITY, UNSPECIFIED GRADE (HCC): ICD-10-CM

## 2022-02-14 PROCEDURE — 99214 OFFICE O/P EST MOD 30 MIN: CPT | Performed by: INTERNAL MEDICINE

## 2022-02-14 PROCEDURE — 99213 OFFICE O/P EST LOW 20 MIN: CPT

## 2022-02-14 NOTE — PROGRESS NOTES
[id number] [facility]  [place] [address]  Dept: [place]: [phone]  Attending progress note      Reason for Visit:   Non-Hodgkin lymphoma. Referring Physician:  Janet Escobar MD    PCP:  Merced Alas MD    History of Present Illness:     Mr. Donita Dobbins is a pleasant 62year old gentleman with a PMHx of trigeminal neuralgia who has been referred to us for follicular lymphoma. He initially had shingles in 7/2021 and felt a lymph node in his axillary area. He does not note progression of size or increase in number. Denies any weight loss, night sweats and low grade fevers. He had CT scan showed lymphadenopathy in the left axilla measuring 11-27 mm. 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 CD10 negative, [facility] sent for [facility] and molecular sequencing. Lab work shows normal kidney function, no anemia. He also had a colonoscopy done by Dr. 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 The patient is doing well, no new problems. Review of Systems;  CONSTITUTIONAL: No fever, chills. Good appetite and energy level. ENMT: Eyes: No diplopia; Nose: No epistaxis. Mouth: No sore throat. RESPIRATORY: No hemoptysis, shortness of breath, cough. CARDIOVASCULAR: No chest pain, palpitations. GASTROINTESTINAL: No nausea/vomiting, abdominal pain, diarrhea/constipation. GENITOURINARY: No dysuria, urinary frequency, hematuria. NEURO: No syncope, presyncope, headache.   Remainder:  ROS NEGATIVE    Past Medical History:      Diagnosis Date    Cancer ([facility] [address]     Headache     Hyperlipidemia     Shingles 07/2021    left arm    Trigeminal neuralgia of left side of face 2009     There is no problem list on file for this patient. Past Surgical History:      Procedure Laterality Date    LYMPH NODE BIOPSY Left 12/13/2021    EXCISION LEFT AXILLARY LYMPH NODE  **LYMPHOMA PROTOCOL** performed by Dale Tubbs MD at [address] [place] [address],[facility] TMJ ARTHROSCOPY      VASCULAR SURGERY Left 11/2019    multivascular decompression of trigeminal nerve       Family History:  Family History   Problem Relation Age of Onset    High Blood Pressure Mother     Other Mother         CHF    Heart Attack Father     Heart Disease Father     Diabetes Father        Medications:  Reviewed and reconciled. Social History:  Social History     Socioeconomic History    Marital status:      Spouse name: Not on file    Number of children: Not on file    Years of education: Not on file    Highest education level: Not on file   Occupational History    Not on file   Tobacco Use    Smoking status: Never Smoker    Smokeless tobacco: Never Used   Vaping Use    Vaping Use: Never used   Substance and Sexual Activity    Alcohol use: Yes     Alcohol/week: 1.0 standard drink     Types: 1 Glasses of wine per week    Drug use: Never    Sexual activity: Not on file   Other Topics Concern    Not on file   Social History Narrative    Not on file     Social Determinants of Health     Financial Resource Strain:     Difficulty of Paying Living Expenses: Not on file   Food Insecurity:     Worried About Running Out of Food in the Last Year: Not on file    Nir of Food in the Last Year: Not on file   Transportation Needs:     Lack of Transportation (Medical): Not on file    Lack of Transportation (Non-Medical):  Not on file   Physical Activity:     Days of Exercise per Week: Not on file    Minutes of Exercise per Session: Not on file   Stress:     Feeling of Stress : Not on file   Social Connections:     Frequency of Communication with Friends and Family: Not on file    Frequency of Social Gatherings with Friends and Family: Not on file   Aura Gonsalves Attends Pentecostalism Services: Not on file    Active Member of Clubs or Organizations: Not on file    Attends Club or Organization Meetings: Not on file    Marital Status: Not on file   Intimate Partner Violence:     Fear of Current or Ex-Partner: Not on file    Emotionally Abused: Not on file    Physically Abused: Not on file    Sexually Abused: Not on file   Housing Stability:     Unable to Pay for Housing in the Last Year: Not on file    Number of [place] in the Last Year: Not on file    Unstable Housing in the Last Year: Not on file       Allergies: Allergies   Allergen Reactions    Cat Hair Extract Other (See Comments)     congestion       Physical Exam:  /81   Pulse 59   Temp 97.1 °F (36.2 °C)   Ht 5' 8\" (1.727 m)   Wt 161 lb 3.2 oz (73.1 kg)   SpO2 99%   BMI 24.51 kg/m²   GENERAL: Alert, oriented x 3, not in acute distress. HEENT: PERRLA; EOMI. Oropharynx clear. NECK: Supple. No palpable cervical or supraclavicular lymphadenopathy. LUNGS: Good air entry bilaterally. No wheezing, crackles or rhonchi. CARDIOVASCULAR: Regular rate. No murmurs, rubs or gallops. ABDOMEN: Soft. Non-tender, non-distended. Positive bowel sounds. EXTREMITIES: Without clubbing, cyanosis, or edema. NEUROLOGIC: No focal deficits. LYMPHATICS: Palpable left axillary lymphadenopathy. ECOG PS 1         Impression/Plan:     Mr. Madisyn Meza is a pleasant 62year old gentleman with a PMHx of trigeminal neuralgia who has been referred to us for follicular lymphoma. He initially had shingles in 7/2021 and felt a lymph node in his axillary area. He does not note progression of size or increase in number. Denies any weight loss, night sweats. low grade fevers. He had CT scan showed lymphadenopathy in the left axilla measuring 11-27 mm. 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 CD10 negative, [facility] sent for [facility] and molecular sequencing.  Lab work shows normal kidney function, no anemia. He also had a colonoscopy done by Dr. 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 Mr. Min Vasquez has biopsy confirmed Follicular lymphoma grade 1-2. He has a low-grade disease, discussed his diagnosis and prognosis, he denies B symptoms and lab work so far is within normal limits. He does not have anemia or thrombocytopenia, LDH is not elevated, slides from his colonoscopy with Dr. Carmen Duvall was reviewed by  the pathologist at [address] [place] [address] and comparison to the lymph node biopsy was done, and they were found to be related to the same process, follicular lymphoma, an addendum will be issued. PET scan was done on 2/7/2022, the results/images were reviewed with the patient, it had revealed metabolic activity associated with the left axillary lymphadenopathy, with low level of activity, asymmetric focal increased areas of mild metabolic activity within the marrow space concerning for extranodal disease, the most significant activity is in the L5 vertebral body, focal intense activity associated with a left thyroid nodule. The patient has stage IV follicular lymphoma, he does not have B symptoms, no cytopenias, no bulky disease, no indication for treatment at this time, recommended observation. I offered the patient a second opinion in [place]. He is scheduled for ketamine infusion at [address] [place] [address] for trigeminal neuralgia. Thyroid nodule that is PET positive, ultrasound was ordered, referral was placed to Endo. RTC in about 3 months. Thank you for allowing us to participate in the care of Mr. Min Vasquez.     Yash Kapoor MD   PGY3 Resident  Internal Medicine    HEMATOLOGY/MEDICAL ONCOLOGY  [address] [address]  Dept: [place]: [phone]

## 2022-03-08 ENCOUNTER — TELEPHONE (OUTPATIENT)
Dept: INFUSION THERAPY | Age: 62
End: 2022-03-08

## 2022-03-08 NOTE — TELEPHONE ENCOUNTER
Called [facility] back regarding emili with Endodontologist the quickest they can see him is 5/10/22- will have  ask Elizabeth on 3/9/22 and see if she is okay with this.

## 2022-03-14 ENCOUNTER — HOSPITAL ENCOUNTER (OUTPATIENT)
Dept: ULTRASOUND IMAGING | Age: 62
Discharge: HOME | End: 2022-03-14
Payer: OTHER GOVERNMENT

## 2022-03-14 DIAGNOSIS — E04.1 THYROID NODULE: ICD-10-CM

## 2022-03-14 PROCEDURE — 76536 US EXAM OF HEAD AND NECK: CPT

## 2022-05-10 ENCOUNTER — OFFICE VISIT (OUTPATIENT)
Dept: ENDOCRINOLOGY | Age: 62
End: 2022-05-10
Payer: OTHER GOVERNMENT

## 2022-05-10 VITALS
WEIGHT: 168 LBS | HEIGHT: 68 IN | SYSTOLIC BLOOD PRESSURE: 135 MMHG | OXYGEN SATURATION: 98 % | BODY MASS INDEX: 25.46 KG/M2 | HEART RATE: 60 BPM | DIASTOLIC BLOOD PRESSURE: 86 MMHG

## 2022-05-10 DIAGNOSIS — C82.94 FOLLICULAR LYMPHOMA OF LYMPH NODES OF AXILLA, UNSPECIFIED FOLLICULAR LYMPHOMA TYPE (HCC): ICD-10-CM

## 2022-05-10 DIAGNOSIS — E04.1 THYROID NODULE: Primary | ICD-10-CM

## 2022-05-10 PROCEDURE — 99204 OFFICE O/P NEW MOD 45 MIN: CPT | Performed by: CLINICAL NURSE SPECIALIST

## 2022-05-10 RX ORDER — GALCANEZUMAB 120 MG/ML
INJECTION, SOLUTION SUBCUTANEOUS
COMMUNITY
Start: 2022-04-22

## 2022-05-10 NOTE — PROGRESS NOTES
[address] [place] [address] Department of Endocrinology Diabetes and Metabolism   [address] [facility] [address]   Phone: [phone]  Fax: [phone]    Date of Service: 5/10/2022    Primary Care Physician: Lea Ornelas MD  Referring physician: Raffy Porras MD  Provider: JENA Chauhan     Reason for the visit:  Evaluation of thyroid nodule      History of Present Illness: The history is provided by the patient. No  was used. Accuracy of the patient data is excellent.   Emerita Reza is a very pleasant 65 y.o. male seen today for thyroid nodule    Patient here for evaluation of thyroid nodule 1st dx on   Context:Thyroid nodule  Incidentally discovered  On CT of chest 11/21  PET scan 2/22 showed focal intense activity associated with the left thyroid nodule  Patient complaining of: Denies   Imaging: 3/22 thyroid US right thyroid lobe 4.9 x 1.7 x 1.8 cm and left thyroid lobe measuring 4.6 x 1.3 x 2 cm, isthmus 0.5 cm  Single nodule left middle lobe 8 x 10 x 7 mm, solid, isoechoic, no microcalcification  Last TFTs: n/a     Recently diagnosed with follicular lymphoma grade 1-2 (following with Oncology)   He had a CT scan that showed lymphadenopathy in the left axilla measuring 11-27 mm biopsy was performed consistent with follicular lymphoma    No family history of thyroid disease/cancer       PAST MEDICAL HISTORY   Past Medical History:   Diagnosis Date    Cancer ([address]     Headache     Hyperlipidemia     Shingles 07/2021    left arm    Trigeminal neuralgia of left side of face 2009       PAST SURGICAL HISTORY   Past Surgical History:   Procedure Laterality Date    LYMPH NODE BIOPSY Left 12/13/2021    EXCISION LEFT AXILLARY LYMPH NODE  **LYMPHOMA PROTOCOL** performed by Lina Manzo MD at [address] [place] [address],[facility] TMJ ARTHROSCOPY      VASCULAR SURGERY Left 11/2019    multivascular decompression of trigeminal nerve       SOCIAL HISTORY   Tobacco: reports that he has never smoked. He has never used smokeless tobacco.  Alcohol:   reports current alcohol use of about 1.0 standard drink of alcohol per week. Drugs:   reports no history of drug use. FAMILY HISTORY   Family History   Problem Relation Age of Onset    High Blood Pressure Mother     Other Mother         CHF    Heart Attack Father     Heart Disease Father     Diabetes Father        ALLERGIES AND DRUG REACTIONS   Allergies   Allergen Reactions    Cat Hair Extract Other (See Comments)     congestion       CURRENT MEDICATIONS   Current Outpatient Medications   Medication Sig Dispense Refill    EMGALITY 120 MG/ML SOAJ inject every MONTH subcutaneously as directed      vitamin E 1000 units capsule Take 1,000 Units by mouth daily      vitamin E 400 UNIT capsule Take 400 Units by mouth daily      Zinc 25 MG TABS Take 1 tablet by mouth daily      Multiple Vitamins-Minerals (CENTRUM SILVER 50+MEN) TABS Take by mouth      rosuvastatin (CRESTOR) 5 MG tablet take 1 tablet by mouth once daily      LORazepam (ATIVAN) 0.5 MG tablet lorazepam 0.5 mg tablet      Rimegepant Sulfate (NURTEC) 75 MG TBDP Take 75 mg by mouth daily as needed       carBAMazepine (CARBATROL) 300 MG extended release capsule Take 300 mg by mouth 2 times daily Morning and afternoon      carBAMazepine (CARBATROL) 300 MG extended release capsule Take 600 mg by mouth nightly      levETIRAcetam (KEPPRA) 500 MG tablet Take 500 mg by mouth 2 times daily Morning and night      Lacosamide (VIMPAT PO) Take 100 mg by mouth 2 times daily Midday and bedtime      AIMOVIG 140 MG/ML SOAJ inject 1 milliliter ( 140 milligrams ) subcutaneously Every Month. ..  (REFER TO PRESCRIPTION NOTES). No current facility-administered medications for this visit. Review of Systems  Constitutional: No fever, no chills, no diaphoresis, no generalized weakness.   HEENT: No blurred vision, No sore throat, no ear pain, no hair loss  Neck: denied any neck swelling, difficulty swallowing,   Cardio-pulmonary: No CP, SOB or palpitation, No orthopnea or PND. No cough or wheezing. GI: No N/V/D, no constipation, No abdominal pain, no melena or hematochezia   : Denied any dysuria, hematuria, flank pain, discharge, or incontinence. Skin: denied any rash, ulcer, Hirsute, or hyperpigmentation. MSK: denied any joint deformity, joint pain/swelling, muscle pain, or back pain. Neuro: no numbness, no tingling, no weakness, _    OBJECTIVE    /86   Pulse 60   Ht 5' 8\" (1.727 m)   Wt 168 lb (76.2 kg)   SpO2 98%   BMI 25.54 kg/m²   BP Readings from Last 4 Encounters:   05/10/22 135/86   02/14/22 133/81   01/24/22 137/80   01/10/22 (!) 144/76     Wt Readings from Last 6 Encounters:   05/10/22 168 lb (76.2 kg)   02/14/22 161 lb 3.2 oz (73.1 kg)   01/24/22 167 lb 11.2 oz (76.1 kg)   01/10/22 165 lb (74.8 kg)   12/10/21 165 lb (74.8 kg)   12/06/21 165 lb (74.8 kg)       Physical examination:  General: awake alert, oriented x3, no abnormal position or movements. HEENT: normocephalic non-traumatic, no exophthalmos   Neck: supple, no LN enlargement, no thyromegaly, no thyroid tenderness, no JVD. Pulm: Clear equal air entry no added sounds, no wheezing or rhonchi    CVS: S1 + S2, no murmur, no heave. Abd: soft lax, no tenderness, no organomegaly, audible bowel sounds. Skin: warm, no lesions, no rash.    Musculoskeletal: No back tenderness, no kyphosis/scoliosis    Neuro: CN intact,  , muscle power normal  Psych: normal mood, and affect      Review of Laboratory Data:  I personally reviewed the following lab:  Lab Results   Component Value Date/Time    WBC 4.9 01/24/2022 12:30 PM    RBC 4.50 01/24/2022 12:30 PM    HGB 14.4 01/24/2022 12:30 PM    HCT 42.7 01/24/2022 12:30 PM    MCV 94.9 01/24/2022 12:30 PM    MCH 32.0 01/24/2022 12:30 PM    MCHC 33.7 01/24/2022 12:30 PM    RDW 12.3 01/24/2022 12:30 PM     01/24/2022 12:30 PM    MPV 10.3 01/24/2022 12:30 PM Lab Results   Component Value Date/Time     01/24/2022 12:30 PM    K 4.6 01/24/2022 12:30 PM    CO2 27 01/24/2022 12:30 PM    BUN 15 01/24/2022 12:30 PM    CREATININE 0.8 01/24/2022 12:30 PM    CALCIUM 9.5 01/24/2022 12:30 PM    LABGLOM >60 01/24/2022 12:30 PM    GFRAA >60 01/24/2022 12:30 PM      Lab Results   Component Value Date/Time    TSH 2.490 04/30/2021 08:41 AM     Lab Results   Component Value Date    GLUCOSE 97 01/24/2022    GLUCOSE 82 11/18/2011     No results found for: LABA1C  Lab Results   Component Value Date    TRIG 75 04/30/2021    HDL 80 04/30/2021    LDLCALC 83 04/30/2021    CHOL 178 04/30/2021     No results found for: [address] [place] [address] Street, a 65 y.o.-old male seen in for the following issues       Assessment:      Diagnosis Orders   1. Thyroid nodule  TSH    US THYROID    US BIOPSY THYROID   2. Follicular lymphoma of lymph nodes of axilla, unspecified follicular lymphoma type ([facility] [address]         Plan:     1. Thyroid nodule   · Thyroid nodule incidentally discovered on imaging  · PET CT showed focal increased activity associated with left thyroid nodule  · Thyroid ultrasound showed left  8 x 10 x 7 mm solid, isoechoic nodule  · Will do biopsy of left thyroid nodule with afirma   · Counseled on thyroid nodules  · 50% of thyroid nodules are discovered on imaging and 95% of them are benign  · No compressive symptoms  · Will check TSH  · Further recommendations will be made after results are obtained       2. Follicular lymphoma  · Following with hematology/oncology    I personally spent > 45 minutes reviewing  external notes from PCP and other patient's care team providers, and personally interpreted labs associated with the above diagnosis. I also ordered labs to further assess and manage the above addressed medical conditions. Return in about 4 months (around 9/10/2022).     The above issues were reviewed with the patient who understood and agreed with the plan. Thank you for allowing us to participate in the care of this patient. Please do not hesitate to contact us with any additional questions. Lisset Espinosa, APRJOHN - CNS     DEBI LOPEZ [facility] - BE[facility]AVIORAL [facility] Diabetes Care and Endocrinology   [address] [address]   Phone: [phone]  Fax: [phone]  --------------------------------------------  An electronic signature was used to authenticate this note.  JENA Gr - CNS on 5/10/2022 at 11:22 AM

## 2022-05-18 ENCOUNTER — HOSPITAL ENCOUNTER (OUTPATIENT)
Dept: INFUSION THERAPY | Age: 62
Discharge: HOME | End: 2022-05-18
Payer: OTHER GOVERNMENT

## 2022-05-18 DIAGNOSIS — C82.94 FOLLICULAR LYMPHOMA OF LYMPH NODES OF UPPER EXTREMITY, UNSPECIFIED GRADE (HCC): ICD-10-CM

## 2022-05-18 LAB
ALBUMIN SERPL-MCNC: 4.5 G/DL (ref 3.5–5.2)
ALP BLD-CCNC: 72 U/L (ref 40–129)
ALT SERPL-CCNC: 21 U/L (ref 0–40)
ANION GAP SERPL CALCULATED.3IONS-SCNC: 10 MMOL/L (ref 7–16)
AST SERPL-CCNC: 29 U/L (ref 0–39)
BASOPHILS ABSOLUTE: 0.04 E9/L (ref 0–0.2)
BASOPHILS RELATIVE PERCENT: 0.8 % (ref 0–2)
BILIRUB SERPL-MCNC: <0.2 MG/DL (ref 0–1.2)
BUN BLDV-MCNC: 15 MG/DL (ref 6–23)
CALCIUM SERPL-MCNC: 9.2 MG/DL (ref 8.6–10.2)
CHLORIDE BLD-SCNC: 99 MMOL/L (ref 98–107)
CO2: 27 MMOL/L (ref 22–29)
CREAT SERPL-MCNC: 0.8 MG/DL (ref 0.7–1.2)
EOSINOPHILS ABSOLUTE: 0.12 E9/L (ref 0.05–0.5)
EOSINOPHILS RELATIVE PERCENT: 2.3 % (ref 0–6)
GFR AFRICAN AMERICAN: >60
GFR NON-AFRICAN AMERICAN: >60 ML/MIN/1.73
GLUCOSE BLD-MCNC: 97 MG/DL (ref 74–99)
HCT VFR BLD CALC: 42.7 % (ref 37–54)
HEMOGLOBIN: 14.5 G/DL (ref 12.5–16.5)
IMMATURE GRANULOCYTES #: 0.02 E9/L
IMMATURE GRANULOCYTES %: 0.4 % (ref 0–5)
LACTATE DEHYDROGENASE: 197 U/L (ref 135–225)
LYMPHOCYTES ABSOLUTE: 1.58 E9/L (ref 1.5–4)
LYMPHOCYTES RELATIVE PERCENT: 30.5 % (ref 20–42)
MCH RBC QN AUTO: 31.9 PG (ref 26–35)
MCHC RBC AUTO-ENTMCNC: 34 % (ref 32–34.5)
MCV RBC AUTO: 94.1 FL (ref 80–99.9)
MONOCYTES ABSOLUTE: 0.51 E9/L (ref 0.1–0.95)
MONOCYTES RELATIVE PERCENT: 9.8 % (ref 2–12)
NEUTROPHILS ABSOLUTE: 2.91 E9/L (ref 1.8–7.3)
NEUTROPHILS RELATIVE PERCENT: 56.2 % (ref 43–80)
PDW BLD-RTO: 11.8 FL (ref 11.5–15)
PLATELET # BLD: 222 E9/L (ref 130–450)
PMV BLD AUTO: 10.3 FL (ref 7–12)
POTASSIUM SERPL-SCNC: 4.5 MMOL/L (ref 3.5–5)
RBC # BLD: 4.54 E12/L (ref 3.8–5.8)
SODIUM BLD-SCNC: 136 MMOL/L (ref 132–146)
TOTAL PROTEIN: 7.3 G/DL (ref 6.4–8.3)
WBC # BLD: 5.2 E9/L (ref 4.5–11.5)

## 2022-05-18 PROCEDURE — 83615 LACTATE (LD) (LDH) ENZYME: CPT

## 2022-05-18 PROCEDURE — 85025 COMPLETE CBC W/AUTO DIFF WBC: CPT

## 2022-05-18 PROCEDURE — 36415 COLL VENOUS BLD VENIPUNCTURE: CPT

## 2022-05-18 PROCEDURE — 84443 ASSAY THYROID STIM HORMONE: CPT

## 2022-05-18 PROCEDURE — 80053 COMPREHEN METABOLIC PANEL: CPT

## 2022-05-19 ENCOUNTER — OFFICE VISIT (OUTPATIENT)
Dept: ONCOLOGY | Age: 62
End: 2022-05-19
Payer: OTHER GOVERNMENT

## 2022-05-19 VITALS
SYSTOLIC BLOOD PRESSURE: 134 MMHG | BODY MASS INDEX: 25.46 KG/M2 | OXYGEN SATURATION: 100 % | TEMPERATURE: 98.2 F | HEIGHT: 68 IN | HEART RATE: 55 BPM | WEIGHT: 168 LBS | DIASTOLIC BLOOD PRESSURE: 89 MMHG

## 2022-05-19 DIAGNOSIS — E04.1 THYROID NODULE: Primary | ICD-10-CM

## 2022-05-19 DIAGNOSIS — C82.94 FOLLICULAR LYMPHOMA OF LYMPH NODES OF UPPER EXTREMITY, UNSPECIFIED GRADE (HCC): ICD-10-CM

## 2022-05-19 LAB — TSH SERPL DL<=0.05 MIU/L-ACNC: 2.66 UIU/ML (ref 0.27–4.2)

## 2022-05-19 PROCEDURE — 99214 OFFICE O/P EST MOD 30 MIN: CPT | Performed by: INTERNAL MEDICINE

## 2022-05-19 PROCEDURE — 99212 OFFICE O/P EST SF 10 MIN: CPT

## 2022-05-19 NOTE — PROGRESS NOTES
[id number] [facility]  [place] [address]  Dept: Rustam: [phone]  Attending progress note      Reason for Visit:   Non-Hodgkin lymphoma. Referring Physician:  Yumiko Wisdom MD    PCP:  Jarred Maldonado MD    History of Present Illness:     Mr. Elsa Herman is a pleasant 65year old gentleman with a PMHx of trigeminal neuralgia who has been referred to us for follicular lymphoma. He initially had shingles in 7/2021 and felt a lymph node in his axillary area. He does not note progression of size or increase in number. Denies any weight loss, night sweats and low grade fevers. He had CT scan showed lymphadenopathy in the left axilla measuring 11-27 mm. 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 CD10 negative, [facility] has sent for [facility] and molecular sequencing. Lab work shows normal kidney function, no anemia. He also had a colonoscopy done by Dr. 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 The patient is doing well, no new problems, no drenching night sweats, unexplained weight loss or infections. He was seen by Endo, thyroid biopsy was ordered. It has been scheduled for next week. Review of Systems;  CONSTITUTIONAL: No fever, chills. Good appetite and energy level. ENMT: Eyes: No diplopia; Nose: No epistaxis. Mouth: No sore throat. RESPIRATORY: No hemoptysis, shortness of breath, cough. CARDIOVASCULAR: No chest pain, palpitations. GASTROINTESTINAL: No nausea/vomiting, abdominal pain, diarrhea/constipation. GENITOURINARY: No dysuria, urinary frequency, hematuria. NEURO: No syncope, presyncope, headache.   Remainder:  ROS NEGATIVE    Past Medical History:      Diagnosis Date    [facility]     Headache     Hyperlipidemia     Shingles 07/2021    left arm    Trigeminal neuralgia of left side of face 2009     There is no problem list on file for this patient. Past Surgical History:      Procedure Laterality Date    LYMPH NODE BIOPSY Left 12/13/2021    EXCISION LEFT AXILLARY LYMPH NODE  **LYMPHOMA PROTOCOL** performed by Jase Kaplan MD at [address] [place] [address],[facility] TMJ ARTHROSCOPY      VASCULAR SURGERY Left 11/2019    multivascular decompression of trigeminal nerve       Family History:  Family History   Problem Relation Age of Onset    High Blood Pressure Mother     Other Mother         CHF    Heart Attack Father     Heart Disease Father     Diabetes Father        Medications:  Reviewed and reconciled. Social History:  Social History     Socioeconomic History    Marital status:      Spouse name: Not on file    Number of children: Not on file    Years of education: Not on file    Highest education level: Not on file   Occupational History    Not on file   Tobacco Use    Smoking status: Never Smoker    Smokeless tobacco: Never Used   Vaping Use    Vaping Use: Never used   Substance and Sexual Activity    Alcohol use: Yes     Alcohol/week: 1.0 standard drink     Types: 1 Glasses of wine per week    Drug use: Never    Sexual activity: Not on file   Other Topics Concern    Not on file   Social History Narrative    Not on file     Social Determinants of Health     Financial Resource Strain:     Difficulty of Paying Living Expenses: Not on file   Food Insecurity:     Worried About Running Out of Food in the Last Year: Not on file    Nir of Food in the Last Year: Not on file   Transportation Needs:     Lack of Transportation (Medical): Not on file    Lack of Transportation (Non-Medical):  Not on file   Physical Activity:     Days of Exercise per Week: Not on file    Minutes of Exercise per Session: Not on file   Stress:     Feeling of Stress : Not on file   Social Connections:     Frequency of Communication with Friends and Family: Not on file    Frequency of Social Gatherings with Friends and Family: Not on file    Attends Restoration Services: Not on file    Active Member of Clubs or Organizations: Not on file    Attends Club or Organization Meetings: Not on file    Marital Status: Not on file   Intimate Partner Violence:     Fear of Current or Ex-Partner: Not on file    Emotionally Abused: Not on file    Physically Abused: Not on file    Sexually Abused: Not on file   Housing Stability:     Unable to Pay for Housing in the Last Year: Not on file    Number of [place] in the Last Year: Not on file    Unstable Housing in the Last Year: Not on file       Allergies: Allergies   Allergen Reactions    Cat Hair Extract Other (See Comments)     congestion       Physical Exam:  /89   Pulse 55   Temp 98.2 °F (36.8 °C)   Ht 5' 8\" (1.727 m)   Wt 168 lb (76.2 kg)   SpO2 100%   BMI 25.54 kg/m²   GENERAL: Alert, oriented x 3, not in acute distress. HEENT: PERRLA; EOMI. Oropharynx clear. NECK: Supple. No palpable cervical or supraclavicular lymphadenopathy. LUNGS: Good air entry bilaterally. No wheezing, crackles or rhonchi. CARDIOVASCULAR: Regular rate. No murmurs, rubs or gallops. ABDOMEN: Soft. Non-tender, non-distended. Positive bowel sounds. EXTREMITIES: Without clubbing, cyanosis, or edema. NEUROLOGIC: No focal deficits. LYMPHATICS: Palpable left axillary lymphadenopathy. ECOG PS 1         Impression/Plan:     Mr. Louann Matthews is a pleasant 65year old gentleman with a PMHx of trigeminal neuralgia who has been referred to us for follicular lymphoma. He initially had shingles in 7/2021 and felt a lymph node in his axillary area. He does not note progression of size or increase in number. Denies any weight loss, night sweats. low grade fevers. He had CT scan showed lymphadenopathy in the left axilla measuring 11-27 mm.  Biopsy was performed on care of Mr. Tiarra Naidu.       HEMATOLOGY/MEDICAL ONCOLOGY  [id number] [facility] [place] [address]  Dept: [place]: [phone]

## 2022-05-25 ENCOUNTER — HOSPITAL ENCOUNTER (OUTPATIENT)
Dept: ULTRASOUND IMAGING | Age: 62
Discharge: HOME | End: 2022-05-25
Payer: OTHER GOVERNMENT

## 2022-05-25 DIAGNOSIS — E04.1 THYROID NODULE: ICD-10-CM

## 2022-05-25 PROCEDURE — 88305 TISSUE EXAM BY PATHOLOGIST: CPT

## 2022-05-25 PROCEDURE — 88173 CYTOPATH EVAL FNA REPORT: CPT

## 2022-05-25 PROCEDURE — 10005 FNA BX W/US GDN 1ST LES: CPT

## 2022-06-03 ENCOUNTER — TELEPHONE (OUTPATIENT)
Dept: ENDOCRINOLOGY | Age: 62
End: 2022-06-03

## 2022-06-03 DIAGNOSIS — C73 THYROID CANCER (HCC): Primary | ICD-10-CM

## 2022-06-22 ENCOUNTER — TELEPHONE (OUTPATIENT)
Dept: ENT CLINIC | Age: 62
End: 2022-06-22

## 2022-06-23 NOTE — TELEPHONE ENCOUNTER
Returned Pts voicemail regarding 07/05 appt Erica. Called Pt back and left message. Pt has been R/S to Fri 07/01 @ 1:40pm. Requested cb to confirm or change this appt if it will not work.

## 2022-07-01 ENCOUNTER — OFFICE VISIT (OUTPATIENT)
Dept: ENT CLINIC | Age: 62
End: 2022-07-01
Payer: OTHER GOVERNMENT

## 2022-07-01 VITALS
SYSTOLIC BLOOD PRESSURE: 135 MMHG | HEART RATE: 59 BPM | BODY MASS INDEX: 25.76 KG/M2 | DIASTOLIC BLOOD PRESSURE: 82 MMHG | HEIGHT: 68 IN | WEIGHT: 170 LBS

## 2022-07-01 DIAGNOSIS — J38.2 VOCAL CORD NODULE: ICD-10-CM

## 2022-07-01 DIAGNOSIS — C73 PAPILLARY THYROID CARCINOMA (HCC): Primary | ICD-10-CM

## 2022-07-01 PROCEDURE — 31575 DIAGNOSTIC LARYNGOSCOPY: CPT | Performed by: OTOLARYNGOLOGY

## 2022-07-01 PROCEDURE — 99204 OFFICE O/P NEW MOD 45 MIN: CPT | Performed by: OTOLARYNGOLOGY

## 2022-07-01 NOTE — PROGRESS NOTES
Pt presents today for new patient appt, pt states \"he was told he has a nodule that should be removed. \" pt has raspy voice occasionally but not pain or difficulty swallowing at this time. Pt does not have a history of smoking.

## 2022-07-01 NOTE — PATIENT INSTRUCTIONS
SURGERY:_____/_____/_____    Nothing to eat or drink after midnight the night before surgery unless surgery is at [facility] or otherwise instructed by the hospital.    DO NOT TAKE ANY ASPIRIN PRODUCTS 7 days prior to surgery. Tylenol only. No Advil, Motrin, Aleve, or Ibuprofen. IF YOU ARE ON BLOOD THINNERS (PLAVIX, COUMADIN, ELIQUIS ETC) THESE WILL ALSO NEED TO BE HELD. Any illegal drugs in your system (including Marijuana even if legally prescribed) will result in your surgery being cancelled. Please be sure to check with our office or the hospital on time frame for the drugs to be out of your system. SHOULD YOUR INSURANCE CHANGE AT ANY TIME YOU MUST CONTACT OUR OFFICE. FAILURE TO DO SO MAY RESULT IN YOUR SURGERY BEING RESCHEDULED OR YOU MAY BE CHARGED AS SELF-PAY. Due to the high demand for surgery at our practice, if you cancel or reschedule your surgery two (2) times we may not reschedule you. If you need FMLA or Short Term Disability paperwork completed for your surgery, please complete your portion, ensure your name and date of birth are on them and fax them to [phone] asap. Paperwork can take up to 2 weeks to be completed. Per current [facility] protocols, COVID Testing is NOT required prior to procedure UNLESS you are symptomatic. (Vaccinated or Unvaccinated)- [facility] requires COVID Testing 72 hours prior to surgery. If you need medical clearance, you are responsible to contact your physician(s) to schedule an appointment for clearance. If clearance is not completed within 30 days of your surgery it may be cancelled. Our office will fax the appropriate forms that need to be completed to your physician(s).     The location of your surgery will call you the day prior to your surgery date to let you know what time you have to be there and any other details. (they usually don't call until late afternoon- early evening.)- IF YOU HAVE QUESTIONS REGARDING THE TIME OF YOUR SURGERY, PLEASE CALL THE FACILITY YOU ARE SCHEDULED AT.     ·       [address] , [address] hospitals will call you a couple days prior to surgery and give you further instructions, if you have any questions, you can reach them at [phone] (per Pre-Admission testing, EKG is required for all patients age 53+, have a diagnosis of hypertension, diabetes, or on dialysis). ·       [address] will [facility] you a couple days prior to surgery and give you further instructions, if you have any questions, you can reach them at [phone] (per Pre-Admission testing, EKG is required for all patients age 53+, have a diagnosis of hypertension, diabetes, or on dialysis). ·       [address] [facility] [place] will call you a couple days prior to surgery and give you further instructions, if you have any questions, you can reach them at [phone] (per Pre-Admission testing, EKG is required for all patients age 53+, have a diagnosis of hypertension, diabetes, or on dialysis). ·       [facility]), [address],  [place], [address] [facility] will call you a couple days prior to surgery and give you further instructions, if you have any questions, you can reach them at [phone]      Pre-Surgery/Anesthesia Video ([facility] ONLY)  Located on [address]:  1. Scroll over Health Information  2. Select Audio and Video  3. Select [employer]  4. Select Your child and Anesthesia  5.  Select Pre surgery [facility]    FOOD RESTRICTIONS--[facility] ONLY  Solid Food/Milk Products --------- Stop 8 hours prior to Surgery  Formula --------- Stop 6 hours prior to Surgery  Breast Milk ------- Stop 4 hours prior to Surgery  Clear liquids (water, Gatorade, Pedialyte) - Stop 2 hours prior to Surgery      Post thyroid/parathyroid surgery instructions:      Calcium Vit D   2 tabs 3 times a day for 1 week                            2 tabs twice a day for 1 week                            2 tabs once a day for 1 week    Can be a tablet or a chocolate chew

## 2022-07-01 NOTE — PROGRESS NOTES
Dear MD Viviane Sarmiento, JENA *     We had the pleasure of seeing Jose Francisco Kam, 1960 here    on 7/1/2022  Please see below for review of care and plans. Chief complaint-     ICD-10-CM    1. Papillary thyroid carcinoma ([facility] [address]  C73          History of Present Illness- Patient presents with papillary thyroid carcinoma of the left thyroid. 8 x 10 x 7 mm nodule was found on PET scan as patient with history of lymphoma. No B systems and is not requiring treatment at this time for the lymphoma. He reports raspy voice occasionally. No smoking. Social drinking. Pt does have history of trigeminal neuralgia. TSH 2.660    Review of Systems- No drainage, discharge, or headache. Complete 10 system ROS completed and negative except as noted above. Physical Examination-   Vital Signs-/82 (Site: Left Upper Arm, Position: Sitting, Cuff Size: Large Adult)   Pulse 59   Ht 5' 8\" (1.727 m)   Wt 170 lb (77.1 kg)   BMI 25.85 kg/m²     Ears- Tympanic membranes clear bilaterally. No middle ear effusion. No pre or post auricular tenderness. Nose- Nasal mucosa clear and dry. No significant septal deviation or inferior turbinate hypertrophy. Oral Cavity/Oropharynx- Floor of mouth and tongue are soft and nontender. No posterior pharyngeal erythema. + gag reflex  Neck- Soft and nontender. No masses, lesions, lymphadenopathy, or thyroid nodules appreciated. Cranial Nerve- Cranial nerves II to XII intact. Extraocular muscles intact. No gross motor visual deficits. No spontaneous nystagmus. Face- No facial skin tenderness to palpation. Heart- No cyanosis, regular  Lungs- No stridor, no intercostal accessory muscle use  General- The patient is in no acute distress. A&O x3      Scope  Procedure note- after pt verbally consented, was sprayed nasally with 1:1 neosynephrine and xylocaine. Scope was passed and found nasal cavity with no lesion.  np clear, tonsil wnl, tongue mobile and no masses, vocal cords mobile bilaterally with full ab and ad duction. Hypopharynx clear, open and no masses. Left sided TVF nodule with 1-2mm glottic gap                    TSH   Date Value Ref Range Status   05/18/2022 2.660 0.270 - 4.200 uIU/mL Final   04/30/2021 2.490 0.270 - 4.200 uIU/mL Final     Calcium   Date Value Ref Range Status   05/18/2022 9.2 8.6 - 10.2 mg/dL Final   01/24/2022 9.5 8.6 - 10.2 mg/dL Final        Medical Decision Making and Treatment Plan. 1. Patient seen and examined with papillary thyroid cancer of the left lobe. Subcentimeter nodule. 2. Discussed left hiram vs total thyroidectomy as nodule less than 1cm. 3.  Risks/benefits/alternatives were discussed with patient including scarring, bleeding, post op hematoma, injury to the recurrent laryngeal nerve and hypocalcemia. Patient voiced understanding and desired to proceed with left hemithyroidectomy, possible total thyroidectomy. Thank you for the opportunity to take part in the care of this very pleasant patient, Adonis Antoine  Sincerely,            Rosendo Esparza.  Danilo Gale M.D., Ph.D., [address] [facility]   Department of Otolaryngology-Head and Neck Surgery  Chief Head & Neck Surgical Oncology  Director Head & Neck Oncology Service Line

## 2022-07-27 ENCOUNTER — TELEPHONE (OUTPATIENT)
Dept: ENT CLINIC | Age: 62
End: 2022-07-27

## 2022-07-27 NOTE — TELEPHONE ENCOUNTER
Prior Authorization Form:      DEMOGRAPHICS:                     Patient Name:  Penny Tinajero  Patient :  1960            Ins[facility]:  [facility] Plan: GE[employer] [address] / Product Type: *No Product type* /   Insurance ID Number:    Payer/Plan Subscr  Sex Relation Sub. Ins. ID Effective Group Num   1. [facility] PPO - [id number] 1960 Male Self [id number] 3/17/21 [id number]                                   [address]         DIAGNOSIS & PROCEDURE:                       Procedure/Operation: Stephan Thyroidectomy (Left)           CPT Code: 96625    Diagnosis:  Thyroid Nodule    ICD10 Code: E04.1    Location:  [place] [address]    Surgeon:   Donna Minor    SCHEDULING INFORMATION:                          Date: 8/15/22    Time: N/A              Anesthesia:  General                                                       Status:  Outpatient        Special Comments:  Nerve Integrity Monitor       Electronically signed by Dino Peña MA on 2022 at 9:21 AM

## 2022-08-19 ENCOUNTER — TELEPHONE (OUTPATIENT)
Dept: ENT CLINIC | Age: 62
End: 2022-08-19

## 2022-08-19 NOTE — TELEPHONE ENCOUNTER
Prior Authorization Form:      DEMOGRAPHICS:                     Patient Name:  Rogerio Valdovinos  Patient :  1960            Ins[facility]:  [facility] / Plan: GE[employer] [address] / Product Type: *No Product type* /   Insurance ID Number:    Payer/Plan Subscr  Sex Relation Sub. Ins. ID Effective Group Num   1.  [facility] PPO - [facility] 1960 Male Self [id number] 3/17/21 [id number]                                   [address]         DIAGNOSIS & PROCEDURE:                       Procedure/Operation: Stephan Thyroidectomy (Left)           CPT Code: 85055    Diagnosis:  Thyroid nodule    ICD10 Code: E04.1    Location:  [facility]    Surgeon:  Dr. Barrera Late INFORMATION:                          Date: 22    Time: n/a              Anesthesia:  General                                                       Status:  Outpatient        Special Comments:  Nerve Integrity Monitor       Electronically signed by Dre Brown MA on 2022 at 7:29 AM

## 2022-08-29 ENCOUNTER — OFFICE VISIT (OUTPATIENT)
Dept: ONCOLOGY | Age: 62
End: 2022-08-29
Payer: OTHER GOVERNMENT

## 2022-08-29 ENCOUNTER — HOSPITAL ENCOUNTER (OUTPATIENT)
Dept: INFUSION THERAPY | Age: 62
Discharge: HOME | End: 2022-08-29
Payer: OTHER GOVERNMENT

## 2022-08-29 VITALS
SYSTOLIC BLOOD PRESSURE: 131 MMHG | WEIGHT: 167.4 LBS | HEIGHT: 68 IN | TEMPERATURE: 97.3 F | BODY MASS INDEX: 25.37 KG/M2 | OXYGEN SATURATION: 100 % | HEART RATE: 51 BPM | DIASTOLIC BLOOD PRESSURE: 89 MMHG

## 2022-08-29 DIAGNOSIS — C82.94 FOLLICULAR LYMPHOMA OF LYMPH NODES OF UPPER EXTREMITY, UNSPECIFIED GRADE (HCC): Primary | ICD-10-CM

## 2022-08-29 LAB
ALBUMIN SERPL-MCNC: 4.6 G/DL (ref 3.5–5.2)
ALP BLD-CCNC: 68 U/L (ref 40–129)
ALT SERPL-CCNC: 18 U/L (ref 0–40)
ANION GAP SERPL CALCULATED.3IONS-SCNC: 10 MMOL/L (ref 7–16)
AST SERPL-CCNC: 24 U/L (ref 0–39)
BASOPHILS ABSOLUTE: 0.04 E9/L (ref 0–0.2)
BASOPHILS RELATIVE PERCENT: 0.8 % (ref 0–2)
BILIRUB SERPL-MCNC: 0.4 MG/DL (ref 0–1.2)
BUN BLDV-MCNC: 19 MG/DL (ref 6–23)
CALCIUM SERPL-MCNC: 9.3 MG/DL (ref 8.6–10.2)
CHLORIDE BLD-SCNC: 101 MMOL/L (ref 98–107)
CO2: 27 MMOL/L (ref 22–29)
CREAT SERPL-MCNC: 0.8 MG/DL (ref 0.7–1.2)
EOSINOPHILS ABSOLUTE: 0.04 E9/L (ref 0.05–0.5)
EOSINOPHILS RELATIVE PERCENT: 0.8 % (ref 0–6)
GFR AFRICAN AMERICAN: >60
GFR NON-AFRICAN AMERICAN: >60 ML/MIN/1.73
GLUCOSE BLD-MCNC: 96 MG/DL (ref 74–99)
HCT VFR BLD CALC: 41.9 % (ref 37–54)
HEMOGLOBIN: 14.3 G/DL (ref 12.5–16.5)
IMMATURE GRANULOCYTES #: 0 E9/L
IMMATURE GRANULOCYTES %: 0 % (ref 0–5)
LACTATE DEHYDROGENASE: 162 U/L (ref 135–225)
LYMPHOCYTES ABSOLUTE: 1.38 E9/L (ref 1.5–4)
LYMPHOCYTES RELATIVE PERCENT: 28.5 % (ref 20–42)
MAGNESIUM: 1.9 MG/DL (ref 1.6–2.6)
MCH RBC QN AUTO: 31.6 PG (ref 26–35)
MCHC RBC AUTO-ENTMCNC: 34.1 % (ref 32–34.5)
MCV RBC AUTO: 92.7 FL (ref 80–99.9)
MONOCYTES ABSOLUTE: 0.43 E9/L (ref 0.1–0.95)
MONOCYTES RELATIVE PERCENT: 8.9 % (ref 2–12)
NEUTROPHILS ABSOLUTE: 2.96 E9/L (ref 1.8–7.3)
NEUTROPHILS RELATIVE PERCENT: 61 % (ref 43–80)
PDW BLD-RTO: 11.9 FL (ref 11.5–15)
PLATELET # BLD: 208 E9/L (ref 130–450)
PMV BLD AUTO: 10.3 FL (ref 7–12)
POTASSIUM SERPL-SCNC: 4.5 MMOL/L (ref 3.5–5)
RBC # BLD: 4.52 E12/L (ref 3.8–5.8)
SODIUM BLD-SCNC: 138 MMOL/L (ref 132–146)
TOTAL PROTEIN: 7 G/DL (ref 6.4–8.3)
TSH SERPL DL<=0.05 MIU/L-ACNC: 2.93 UIU/ML (ref 0.27–4.2)
WBC # BLD: 4.9 E9/L (ref 4.5–11.5)

## 2022-08-29 PROCEDURE — 85025 COMPLETE CBC W/AUTO DIFF WBC: CPT

## 2022-08-29 PROCEDURE — 83615 LACTATE (LD) (LDH) ENZYME: CPT

## 2022-08-29 PROCEDURE — 84443 ASSAY THYROID STIM HORMONE: CPT

## 2022-08-29 PROCEDURE — 80053 COMPREHEN METABOLIC PANEL: CPT

## 2022-08-29 PROCEDURE — 99214 OFFICE O/P EST MOD 30 MIN: CPT | Performed by: INTERNAL MEDICINE

## 2022-08-29 PROCEDURE — 36415 COLL VENOUS BLD VENIPUNCTURE: CPT

## 2022-08-29 PROCEDURE — 99212 OFFICE O/P EST SF 10 MIN: CPT

## 2022-08-29 PROCEDURE — 83735 ASSAY OF MAGNESIUM: CPT

## 2022-08-29 NOTE — PROGRESS NOTES
[id number] [facility]  [place] [address]  Dept: [place]: [phone]  Attending progress note      Reason for Visit:   Non-Hodgkin lymphoma. Referring Physician:  Myrna Alcala MD    PCP:  Abel Rodriguez MD    History of Present Illness:     Mr. Phoebe Ivan is a pleasant 65year old gentleman with a PMHx of trigeminal neuralgia who has been referred to us for follicular lymphoma. He initially had shingles in 7/2021 and felt a lymph node in his axillary area. He does not note progression of size or increase in number. Denies any weight loss, night sweats and low grade fevers. He had CT scan showed lymphadenopathy in the left axilla measuring 11-27 mm. 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 CD10 negative, [facility] has sent for [facility] and molecular sequencing. Lab work shows normal kidney function, no anemia. He also had a colonoscopy done by Dr. 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 The patient is doing well, no new problems, no drenching night sweats, unexplained weight loss, he has fluttering sensation in the right upper extremity in the legs, it is infrequent. Review of Systems;  CONSTITUTIONAL: No fever, chills. Good appetite and energy level. ENMT: Eyes: No diplopia; Nose: No epistaxis. Mouth: No sore throat. RESPIRATORY: No hemoptysis, shortness of breath, cough. CARDIOVASCULAR: No chest pain, palpitations. GASTROINTESTINAL: No nausea/vomiting, abdominal pain, diarrhea/constipation. GENITOURINARY: No dysuria, urinary frequency, hematuria. NEURO: No syncope, presyncope, headache.   Remainder:  ROS NEGATIVE    Past Medical History:      Diagnosis Date    Cancer ([facility] [address]     Headache Hyperlipidemia     Shingles 07/2021    left arm    Trigeminal neuralgia of left side of face 2009     There is no problem list on file for this patient. Past Surgical History:      Procedure Laterality Date    LYMPH NODE BIOPSY Left 12/13/2021    EXCISION LEFT AXILLARY LYMPH NODE  **LYMPHOMA PROTOCOL** performed by Rita Cai MD at [address]    TMJ ARTHROSCOPY      VASCULAR SURGERY Left 11/2019    multivascular decompression of trigeminal nerve       Family History:  Family History   Problem Relation Age of Onset    High Blood Pressure Mother     Other Mother         CHF    Heart Attack Father     Heart Disease Father     Diabetes Father        Medications:  Reviewed and reconciled. Social History:  Social History     Socioeconomic History    Marital status:      Spouse name: Not on file    Number of children: Not on file    Years of education: Not on file    Highest education level: Not on file   Occupational History    Not on file   Tobacco Use    Smoking status: Never    Smokeless tobacco: Never   Vaping Use    Vaping Use: Never used   Substance and Sexual Activity    Alcohol use: Yes     Alcohol/week: 1.0 standard drink     Types: 1 Glasses of wine per week    Drug use: Never    Sexual activity: Not on file   Other Topics Concern    Not on file   Social History Narrative    Not on file     Social Determinants of Health     Financial Resource Strain: Not on file   Food Insecurity: Not on file   Transportation Needs: Not on file   Physical Activity: Not on file   Stress: Not on file   Social Connections: Not on file   Intimate Partner Violence: Not on file   Housing Stability: Not on file       Allergies:   Allergies   Allergen Reactions    Cat Hair Extract Other (See Comments)     congestion       Physical Exam:  /89   Pulse 51   Temp 97.3 °F (36.3 °C)   Ht 5' 8\" (1.727 m)   Wt 167 lb 6.4 oz (75.9 kg)   SpO2 100%   BMI 25.45 kg/m²   GENERAL: Alert, oriented x 3, not in acute distress. HEENT: PERRLA; EOMI. Oropharynx clear. NECK: Supple. No palpable cervical or supraclavicular lymphadenopathy. LUNGS: Good air entry bilaterally. No wheezing, crackles or rhonchi. CARDIOVASCULAR: Regular rate. No murmurs, rubs or gallops. ABDOMEN: Soft. Non-tender, non-distended. Positive bowel sounds. EXTREMITIES: Without clubbing, cyanosis, or edema. NEUROLOGIC: No focal deficits. LYMPHATICS: Palpable left axillary lymphadenopathy. ECOG PS 1         Impression/Plan:     Mr. Phoebe Ivan is a pleasant 65year old gentleman with a PMHx of trigeminal neuralgia who has been referred to us for follicular lymphoma. He initially had shingles in 7/2021 and felt a lymph node in his axillary area. He does not note progression of size or increase in number. Denies any weight loss, night sweats. low grade fevers. He had CT scan showed lymphadenopathy in the left axilla measuring 11-27 mm. 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 CD10 negative, [facility] has sent for [facility] and molecular sequencing. Lab work shows normal kidney function, no anemia. He also had a colonoscopy done by Dr. 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 Mr. Lucky Sandhoff has biopsy confirmed Follicular lymphoma grade 1-2. He has a low-grade disease, discussed his diagnosis and prognosis, he denies B symptoms and lab work so far is within normal limits. He does not have anemia or thrombocytopenia, LDH is not elevated, slides from his colonoscopy with Dr. Reid Badillo was reviewed by  the pathologist at [facility] and comparison to the lymph node biopsy was done, and they were found to be related to the same process, follicular lymphoma, an addendum will be issued.     PET scan was done on 2/7/2022, the results/images were reviewed with the patient, it had revealed metabolic activity associated with the left axillary lymphadenopathy, with low level of activity, asymmetric focal increased areas of mild metabolic activity within the marrow space concerning for extranodal disease, the most significant activity is in the L5 vertebral body, focal intense activity associated with a left thyroid nodule. The patient has stage IV follicular lymphoma, he does not have B symptoms, no bulky disease, he has no cytopenias, LDH has been normal, pending from today, no indication for treatment at this time, recommended continued observation. We will add magnesium to his labs from today. Papillary carcinoma, plan for hemithyroidectomy with Dr. Romeo Johnson on September 12th, await path results. RTC in about 3 months. Thank you for allowing us to participate in the care of Mr. Inés Castillo.       HEMATOLOGY/MEDICAL ONCOLOGY  [id number] [facility]  [facility] [address]  Dept: [place]: [phone]

## 2022-09-07 RX ORDER — ASCORBIC ACID 500 MG
500 TABLET ORAL 2 TIMES DAILY
COMMUNITY

## 2022-09-07 RX ORDER — MAGNESIUM 30 MG
25 TABLET ORAL DAILY
COMMUNITY

## 2022-09-07 NOTE — PROGRESS NOTES
[address]   PRE-ADMISSION TESTING GENERAL INSTRUCTIONS  [facility] Phone Number: [phone]      GENERAL INSTRUCTIONS:    [x] Antibacterial Soap Shower Night before and/or AM of surgery. [] CHG Wipes instruction sheet and wipes given. [] Hibiclens shower the night before and the morning of surgery.   -Do not use Hibiclens on your face or head. [x] Do not wear makeup, lotions, powders, deodorant. [x] Nothing by mouth after midnight; including gum, candy, mints, or water. [x] You may brush your teeth, gargle, but do NOT swallow water. [x] No tobacco products, illegal drugs, or alcohol within 24 hours of your surgery. [x] Jewelry or valuables should not be brought to the hospital. All body and/or tongue piercing's must be removed prior to arriving to hospital. No contact lens or hair pins. [x] Arrange transportation with a responsible adult  to and from the hospital. Arrange for someone to be with you for the remainder of the day and for 24 hours after your procedure due to having had anesthesia. -Who will be your  for transportation?___wife_______________         -Who will be staying with you for 24 hrs after your procedure? ___wife_______________  [x] Bring insurance card and photo ID.  [] Bring copy of living will or healthcare power of  papers to be placed in your electronic record. [] Urine Pregnancy test will be preformed the day of surgery. A specimen sample may be brought from home. [] Transfusion Bracelet: Please bring with you to hospital, day of surgery. PARKING INSTRUCTIONS:     [x] ARRIVAL DATE & TIME: 9/12 at 1000  [x] Enter into the [employer]. Two people may accompany you. Masks are required. [x] Parking Lot \"I\" is where you will park. It is located on the corner of [facility] [address] [facility] and [facility]. The entrance is on [facility].    Upon entering the parking lot, a voucher ticket will print    EDUCATION INSTRUCTIONS: [] Knee or Hip replacement booklet & exercise pamphlets given. [] [address] placed in chart. [] Pre-admission Testing educational folder given  [] Incentive Spirometry,coughing & deep breathing exercises reviewed. [] Medication information sheet(s)   [] Fluoroscopy-Xray used in surgery reviewed with patient. Educational pamphlet placed in chart. [x] Pain: Post-op pain is normal and to be expected. You will be asked to rate your pain from 0-10. [] Joint camp offered. [] Joint replacement booklets given.  [] Spine Navigator to see in PAT. [] Other:___________________________    MEDICATION INSTRUCTIONS:    [x] Bring a complete list of your medications, please write the last time you took the medicine, give this list to the nurse in Pre-Op. [x] Take only the following medications the morning of surgery with 1-2 ounces of water: carbatrol, keppra  [x] Stop all herbal supplements and vitamins 5 days before surgery. Stop NSAIDS 7 days before surgery. [] DO NOT take any diabetic medicine the morning of surgery. Follow instructions for insulin the day before surgery. [] If you are diabetic and your blood sugar is low or you feel symptomatic, you may drink 1-2 ounces of apple juice or take a glucose tablet.            -The morning of your procedure, you may call the pre-op area if you have concerns about your blood sugar [phone]. [] Use your inhalers the morning of surgery. Bring your emergency inhaler with you day of surgery. [] Follow physician instructions regarding any blood thinners you may be taking. WHAT TO EXPECT:    [x] The day of surgery you will be greeted and checked in by the [employer].  In addition, you will be registered in the [facility] by a Patient Access Representative. Please bring your photo ID and insurance card.  A nurse will greet you in accordance to the time you are needed in the pre-op area to prepare you for surgery. Please do not be discouraged if you are not greeted in the order you arrive as there are many variables that are involved in patient preparation. Your patience is greatly appreciated as you wait for your nurse. Please bring in items such as: books, magazines, newspapers, electronics, or any other items  to occupy your time in the waiting area. [x]  Delays may occur with surgery and staff will make a sincere effort to keep you informed of delays. If any delays occur with your procedure, we apologize ahead of time for your inconvenience as we recognize the value of your time.

## 2022-09-09 ENCOUNTER — ANESTHESIA EVENT (OUTPATIENT)
Dept: OPERATING ROOM | Age: 62
End: 2022-09-09
Payer: OTHER GOVERNMENT

## 2022-09-11 NOTE — H&P
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 8 x 10 x 7 mm nodule was found on PET scan as patient with history of lymphoma. No B systems and is not requiring treatment at this time for the lymphoma. He reports raspy voice occasionally. No smoking. Social drinking. Pt does have history of trigeminal neuralgia. TSH 2.660    Review of Systems- No drainage, discharge, or headache. Complete 10 system ROS completed and negative except as noted above. Physical Examination-   Vital Signs-/82 (Site: Left Upper Arm, Position: Sitting, Cuff Size: Large Adult)   Pulse 59   Ht 5' 8\" (1.727 m)   Wt 170 lb (77.1 kg)   BMI 25.85 kg/m²     Ears- Tympanic membranes clear bilaterally. No middle ear effusion. No pre or post auricular tenderness. Nose- Nasal mucosa clear and dry. No significant septal deviation or inferior turbinate hypertrophy. Oral Cavity/Oropharynx- Floor of mouth and tongue are soft and nontender. No posterior pharyngeal erythema. + gag reflex  Neck- Soft and nontender. No masses, lesions, lymphadenopathy, or thyroid nodules appreciated. Cranial Nerve- Cranial nerves II to XII intact. Extraocular muscles intact. No gross motor visual deficits. No spontaneous nystagmus. Face- No facial skin tenderness to palpation. Heart- No cyanosis, regular  Lungs- No stridor, no intercostal accessory muscle use  General- The patient is in no acute distress. A&O x3        Scope  Procedure note- after pt verbally consented, was sprayed nasally with 1:1 neosynephrine and xylocaine. Scope was passed and found nasal cavity with no lesion. np clear, tonsil wnl, tongue mobile and no masses, vocal cords mobile bilaterally with full ab and ad duction. Hypopharynx clear, open and no masses.  Left sided TVF nodule with 1-2mm glottic gap                             TSH   Date Value Ref Range Status   05/18/2022 2.660 0.270 - 4.200 uIU/mL Final 04/30/2021 2.490 0.270 - 4.200 uIU/mL Final            Calcium   Date Value Ref Range Status   05/18/2022 9.2 8.6 - 10.2 mg/dL Final   01/24/2022 9.5 8.6 - 10.2 mg/dL Final        Medical Decision Making and Treatment Plan. 1. Patient seen and examined with papillary thyroid cancer of the left lobe. Subcentimeter nodule. 2. Discussed left hiram vs total thyroidectomy as nodule less than 1cm. 3.  Risks/benefits/alternatives were discussed with patient including scarring, bleeding, post op hematoma, injury to the recurrent laryngeal nerve and hypocalcemia. Patient voiced understanding and desired to proceed with left hemithyroidectomy, possible total thyroidectomy. Thank you for the opportunity to take part in the care of this very pleasant patient, Penny Tinajero  Sincerely,             Kasia Rojas.  Romeo Johnson M.D., Ph.D., [address] [place] [address]   Department of Otolaryngology-Head and Neck Surgery  Chief Head & Neck Surgical Oncology  Director Head & Neck Oncology Service Line

## 2022-09-12 ENCOUNTER — HOSPITAL ENCOUNTER (OUTPATIENT)
Age: 62
Setting detail: OUTPATIENT SURGERY
Discharge: HOSPICE | End: 2022-09-12
Attending: OTOLARYNGOLOGY | Admitting: OTOLARYNGOLOGY
Payer: OTHER GOVERNMENT

## 2022-09-12 ENCOUNTER — ANESTHESIA (OUTPATIENT)
Dept: OPERATING ROOM | Age: 62
End: 2022-09-12
Payer: OTHER GOVERNMENT

## 2022-09-12 VITALS
TEMPERATURE: 97 F | SYSTOLIC BLOOD PRESSURE: 132 MMHG | RESPIRATION RATE: 19 BRPM | HEART RATE: 56 BPM | HEIGHT: 68 IN | WEIGHT: 165 LBS | BODY MASS INDEX: 25.01 KG/M2 | DIASTOLIC BLOOD PRESSURE: 94 MMHG | OXYGEN SATURATION: 96 %

## 2022-09-12 DIAGNOSIS — Z01.812 PRE-OPERATIVE LABORATORY EXAMINATION: Primary | ICD-10-CM

## 2022-09-12 DIAGNOSIS — E04.1 THYROID NODULE: ICD-10-CM

## 2022-09-12 DIAGNOSIS — G89.18 POST-OP PAIN: ICD-10-CM

## 2022-09-12 LAB
HCT VFR BLD CALC: 42.4 % (ref 37–54)
HEMOGLOBIN: 14.8 G/DL (ref 12.5–16.5)
MCH RBC QN AUTO: 31.6 PG (ref 26–35)
MCHC RBC AUTO-ENTMCNC: 34.9 % (ref 32–34.5)
MCV RBC AUTO: 90.4 FL (ref 80–99.9)
PDW BLD-RTO: 12.2 FL (ref 11.5–15)
PLATELET # BLD: 222 E9/L (ref 130–450)
PMV BLD AUTO: 10.6 FL (ref 7–12)
RBC # BLD: 4.69 E12/L (ref 3.8–5.8)
WBC # BLD: 5.7 E9/L (ref 4.5–11.5)

## 2022-09-12 PROCEDURE — 88307 TISSUE EXAM BY PATHOLOGIST: CPT

## 2022-09-12 PROCEDURE — 3600000004 HC SURGERY LEVEL 4 BASE: Performed by: OTOLARYNGOLOGY

## 2022-09-12 PROCEDURE — 2580000003 HC RX 258

## 2022-09-12 PROCEDURE — 2500000003 HC RX 250 WO HCPCS: Performed by: NURSE ANESTHETIST, CERTIFIED REGISTERED

## 2022-09-12 PROCEDURE — 6360000002 HC RX W HCPCS: Performed by: ANESTHESIOLOGY

## 2022-09-12 PROCEDURE — 2580000003 HC RX 258: Performed by: STUDENT IN AN ORGANIZED HEALTH CARE EDUCATION/TRAINING PROGRAM

## 2022-09-12 PROCEDURE — 7100000010 HC PHASE II RECOVERY - FIRST 15 MIN: Performed by: OTOLARYNGOLOGY

## 2022-09-12 PROCEDURE — 7100000011 HC PHASE II RECOVERY - ADDTL 15 MIN: Performed by: OTOLARYNGOLOGY

## 2022-09-12 PROCEDURE — 3600000014 HC SURGERY LEVEL 4 ADDTL 15MIN: Performed by: OTOLARYNGOLOGY

## 2022-09-12 PROCEDURE — 6370000000 HC RX 637 (ALT 250 FOR IP): Performed by: ANESTHESIOLOGY

## 2022-09-12 PROCEDURE — 60220 PARTIAL REMOVAL OF THYROID: CPT | Performed by: OTOLARYNGOLOGY

## 2022-09-12 PROCEDURE — 6360000002 HC RX W HCPCS

## 2022-09-12 PROCEDURE — 3700000001 HC ADD 15 MINUTES (ANESTHESIA): Performed by: OTOLARYNGOLOGY

## 2022-09-12 PROCEDURE — 3700000000 HC ANESTHESIA ATTENDED CARE: Performed by: OTOLARYNGOLOGY

## 2022-09-12 PROCEDURE — 2500000003 HC RX 250 WO HCPCS: Performed by: OTOLARYNGOLOGY

## 2022-09-12 PROCEDURE — 2720000010 HC SURG SUPPLY STERILE: Performed by: OTOLARYNGOLOGY

## 2022-09-12 PROCEDURE — 7100000001 HC PACU RECOVERY - ADDTL 15 MIN: Performed by: OTOLARYNGOLOGY

## 2022-09-12 PROCEDURE — 2500000003 HC RX 250 WO HCPCS

## 2022-09-12 PROCEDURE — 7100000000 HC PACU RECOVERY - FIRST 15 MIN: Performed by: OTOLARYNGOLOGY

## 2022-09-12 PROCEDURE — 36415 COLL VENOUS BLD VENIPUNCTURE: CPT

## 2022-09-12 PROCEDURE — 2709999900 HC NON-CHARGEABLE SUPPLY: Performed by: OTOLARYNGOLOGY

## 2022-09-12 PROCEDURE — 6360000002 HC RX W HCPCS: Performed by: STUDENT IN AN ORGANIZED HEALTH CARE EDUCATION/TRAINING PROGRAM

## 2022-09-12 PROCEDURE — 85027 COMPLETE CBC AUTOMATED: CPT

## 2022-09-12 RX ORDER — ROCURONIUM BROMIDE 10 MG/ML
INJECTION, SOLUTION INTRAVENOUS PRN
Status: DISCONTINUED | OUTPATIENT
Start: 2022-09-12 | End: 2022-09-12 | Stop reason: SDUPTHER

## 2022-09-12 RX ORDER — PHENYLEPHRINE HCL IN 0.9% NACL 1 MG/10 ML
SYRINGE (ML) INTRAVENOUS PRN
Status: DISCONTINUED | OUTPATIENT
Start: 2022-09-12 | End: 2022-09-12 | Stop reason: SDUPTHER

## 2022-09-12 RX ORDER — DEXAMETHASONE SODIUM PHOSPHATE 10 MG/ML
INJECTION INTRAMUSCULAR; INTRAVENOUS PRN
Status: DISCONTINUED | OUTPATIENT
Start: 2022-09-12 | End: 2022-09-12 | Stop reason: SDUPTHER

## 2022-09-12 RX ORDER — ONDANSETRON 4 MG/1
4 TABLET, ORALLY DISINTEGRATING ORAL EVERY 8 HOURS PRN
Qty: 15 TABLET | Refills: 0 | Status: SHIPPED | OUTPATIENT
Start: 2022-09-12

## 2022-09-12 RX ORDER — SODIUM CHLORIDE 0.9 % (FLUSH) 0.9 %
5-40 SYRINGE (ML) INJECTION EVERY 12 HOURS SCHEDULED
Status: DISCONTINUED | OUTPATIENT
Start: 2022-09-12 | End: 2022-09-12 | Stop reason: HOSPADM

## 2022-09-12 RX ORDER — DIPHENHYDRAMINE HYDROCHLORIDE 50 MG/ML
12.5 INJECTION INTRAMUSCULAR; INTRAVENOUS
Status: DISCONTINUED | OUTPATIENT
Start: 2022-09-12 | End: 2022-09-12 | Stop reason: HOSPADM

## 2022-09-12 RX ORDER — LABETALOL HYDROCHLORIDE 5 MG/ML
5 INJECTION, SOLUTION INTRAVENOUS
Status: DISCONTINUED | OUTPATIENT
Start: 2022-09-12 | End: 2022-09-12 | Stop reason: HOSPADM

## 2022-09-12 RX ORDER — ONDANSETRON 2 MG/ML
INJECTION INTRAMUSCULAR; INTRAVENOUS PRN
Status: DISCONTINUED | OUTPATIENT
Start: 2022-09-12 | End: 2022-09-12 | Stop reason: SDUPTHER

## 2022-09-12 RX ORDER — SODIUM CHLORIDE 0.9 % (FLUSH) 0.9 %
5-40 SYRINGE (ML) INJECTION PRN
Status: DISCONTINUED | OUTPATIENT
Start: 2022-09-12 | End: 2022-09-12 | Stop reason: HOSPADM

## 2022-09-12 RX ORDER — ONDANSETRON 2 MG/ML
4 INJECTION INTRAMUSCULAR; INTRAVENOUS
Status: DISCONTINUED | OUTPATIENT
Start: 2022-09-12 | End: 2022-09-12 | Stop reason: HOSPADM

## 2022-09-12 RX ORDER — MIDAZOLAM HYDROCHLORIDE 1 MG/ML
INJECTION INTRAMUSCULAR; INTRAVENOUS PRN
Status: DISCONTINUED | OUTPATIENT
Start: 2022-09-12 | End: 2022-09-12 | Stop reason: SDUPTHER

## 2022-09-12 RX ORDER — SUCCINYLCHOLINE/SOD CL,ISO/PF 200MG/10ML
SYRINGE (ML) INTRAVENOUS PRN
Status: DISCONTINUED | OUTPATIENT
Start: 2022-09-12 | End: 2022-09-12 | Stop reason: SDUPTHER

## 2022-09-12 RX ORDER — ACETAMINOPHEN 325 MG/1
650 TABLET ORAL
Status: DISCONTINUED | OUTPATIENT
Start: 2022-09-12 | End: 2022-09-12 | Stop reason: HOSPADM

## 2022-09-12 RX ORDER — FENTANYL CITRATE 50 UG/ML
INJECTION, SOLUTION INTRAMUSCULAR; INTRAVENOUS PRN
Status: DISCONTINUED | OUTPATIENT
Start: 2022-09-12 | End: 2022-09-12 | Stop reason: SDUPTHER

## 2022-09-12 RX ORDER — LIDOCAINE HYDROCHLORIDE 20 MG/ML
INJECTION, SOLUTION EPIDURAL; INFILTRATION; INTRACAUDAL; PERINEURAL PRN
Status: DISCONTINUED | OUTPATIENT
Start: 2022-09-12 | End: 2022-09-12 | Stop reason: SDUPTHER

## 2022-09-12 RX ORDER — CEPHALEXIN 500 MG/1
500 CAPSULE ORAL 2 TIMES DAILY
Qty: 14 CAPSULE | Refills: 0 | Status: SHIPPED | OUTPATIENT
Start: 2022-09-12 | End: 2022-09-19

## 2022-09-12 RX ORDER — DROPERIDOL 2.5 MG/ML
0.62 INJECTION, SOLUTION INTRAMUSCULAR; INTRAVENOUS
Status: DISCONTINUED | OUTPATIENT
Start: 2022-09-12 | End: 2022-09-12 | Stop reason: HOSPADM

## 2022-09-12 RX ORDER — LIDOCAINE HYDROCHLORIDE AND EPINEPHRINE 10; 10 MG/ML; UG/ML
INJECTION, SOLUTION INFILTRATION; PERINEURAL PRN
Status: DISCONTINUED | OUTPATIENT
Start: 2022-09-12 | End: 2022-09-12 | Stop reason: ALTCHOICE

## 2022-09-12 RX ORDER — SODIUM CHLORIDE 9 MG/ML
INJECTION, SOLUTION INTRAVENOUS CONTINUOUS PRN
Status: DISCONTINUED | OUTPATIENT
Start: 2022-09-12 | End: 2022-09-12 | Stop reason: SDUPTHER

## 2022-09-12 RX ORDER — TRAMADOL HYDROCHLORIDE 50 MG/1
50 TABLET ORAL
Status: COMPLETED | OUTPATIENT
Start: 2022-09-12 | End: 2022-09-12

## 2022-09-12 RX ORDER — SODIUM CHLORIDE 9 MG/ML
INJECTION, SOLUTION INTRAVENOUS PRN
Status: DISCONTINUED | OUTPATIENT
Start: 2022-09-12 | End: 2022-09-12 | Stop reason: HOSPADM

## 2022-09-12 RX ORDER — HYDRALAZINE HYDROCHLORIDE 20 MG/ML
5 INJECTION INTRAMUSCULAR; INTRAVENOUS
Status: DISCONTINUED | OUTPATIENT
Start: 2022-09-12 | End: 2022-09-12 | Stop reason: HOSPADM

## 2022-09-12 RX ORDER — IPRATROPIUM BROMIDE AND ALBUTEROL SULFATE 2.5; .5 MG/3ML; MG/3ML
1 SOLUTION RESPIRATORY (INHALATION)
Status: DISCONTINUED | OUTPATIENT
Start: 2022-09-12 | End: 2022-09-12 | Stop reason: HOSPADM

## 2022-09-12 RX ORDER — SODIUM CHLORIDE 9 MG/ML
25 INJECTION, SOLUTION INTRAVENOUS PRN
Status: DISCONTINUED | OUTPATIENT
Start: 2022-09-12 | End: 2022-09-12 | Stop reason: HOSPADM

## 2022-09-12 RX ORDER — HYDROCODONE BITARTRATE AND ACETAMINOPHEN 5; 325 MG/1; MG/1
1 TABLET ORAL EVERY 6 HOURS PRN
Qty: 28 TABLET | Refills: 0 | Status: SHIPPED | OUTPATIENT
Start: 2022-09-12 | End: 2022-09-19

## 2022-09-12 RX ORDER — PROPOFOL 10 MG/ML
INJECTION, EMULSION INTRAVENOUS PRN
Status: DISCONTINUED | OUTPATIENT
Start: 2022-09-12 | End: 2022-09-12 | Stop reason: SDUPTHER

## 2022-09-12 RX ORDER — PROPOFOL 10 MG/ML
INJECTION, EMULSION INTRAVENOUS CONTINUOUS PRN
Status: DISCONTINUED | OUTPATIENT
Start: 2022-09-12 | End: 2022-09-12 | Stop reason: SDUPTHER

## 2022-09-12 RX ORDER — MIDAZOLAM HYDROCHLORIDE 2 MG/2ML
2 INJECTION, SOLUTION INTRAMUSCULAR; INTRAVENOUS
Status: DISCONTINUED | OUTPATIENT
Start: 2022-09-12 | End: 2022-09-12 | Stop reason: HOSPADM

## 2022-09-12 RX ADMIN — PROPOFOL 200 MCG/KG/MIN: 10 INJECTION, EMULSION INTRAVENOUS at 13:24

## 2022-09-12 RX ADMIN — DEXAMETHASONE SODIUM PHOSPHATE 10 MG: 10 INJECTION INTRAMUSCULAR; INTRAVENOUS at 13:45

## 2022-09-12 RX ADMIN — ONDANSETRON 4 MG: 2 INJECTION INTRAMUSCULAR; INTRAVENOUS at 14:30

## 2022-09-12 RX ADMIN — SODIUM CHLORIDE: 9 INJECTION, SOLUTION INTRAVENOUS at 10:41

## 2022-09-12 RX ADMIN — Medication 100 MCG: at 14:11

## 2022-09-12 RX ADMIN — HYDROMORPHONE HYDROCHLORIDE 0.5 MG: 1 INJECTION, SOLUTION INTRAMUSCULAR; INTRAVENOUS; SUBCUTANEOUS at 16:00

## 2022-09-12 RX ADMIN — Medication 140 MG: at 13:21

## 2022-09-12 RX ADMIN — TRAMADOL HYDROCHLORIDE 50 MG: 50 TABLET ORAL at 16:20

## 2022-09-12 RX ADMIN — ROCURONIUM BROMIDE 5 MG: 10 INJECTION, SOLUTION INTRAVENOUS at 13:21

## 2022-09-12 RX ADMIN — MIDAZOLAM 2 MG: 1 INJECTION INTRAMUSCULAR; INTRAVENOUS at 13:04

## 2022-09-12 RX ADMIN — Medication 60 MG: at 13:21

## 2022-09-12 RX ADMIN — FENTANYL CITRATE 50 MCG: 50 INJECTION, SOLUTION INTRAMUSCULAR; INTRAVENOUS at 13:26

## 2022-09-12 RX ADMIN — SODIUM CHLORIDE: 9 INJECTION, SOLUTION INTRAVENOUS at 13:21

## 2022-09-12 RX ADMIN — Medication 100 MCG: at 14:30

## 2022-09-12 RX ADMIN — CEFAZOLIN 2000 MG: 2 INJECTION, POWDER, FOR SOLUTION INTRAMUSCULAR; INTRAVENOUS at 13:27

## 2022-09-12 RX ADMIN — PROPOFOL 200 MG: 10 INJECTION, EMULSION INTRAVENOUS at 13:21

## 2022-09-12 RX ADMIN — FENTANYL CITRATE 100 MCG: 50 INJECTION, SOLUTION INTRAMUSCULAR; INTRAVENOUS at 13:21

## 2022-09-12 RX ADMIN — HYDROMORPHONE HYDROCHLORIDE 0.5 MG: 1 INJECTION, SOLUTION INTRAMUSCULAR; INTRAVENOUS; SUBCUTANEOUS at 15:45

## 2022-09-12 ASSESSMENT — PAIN DESCRIPTION - DESCRIPTORS
DESCRIPTORS: ACHING;DISCOMFORT
DESCRIPTORS: ACHING;DISCOMFORT;DULL
DESCRIPTORS: ACHING;DISCOMFORT;DULL
DESCRIPTORS: ACHING;DISCOMFORT

## 2022-09-12 ASSESSMENT — PAIN - FUNCTIONAL ASSESSMENT
PAIN_FUNCTIONAL_ASSESSMENT: NONE - DENIES PAIN
PAIN_FUNCTIONAL_ASSESSMENT: ACTIVITIES ARE NOT PREVENTED
PAIN_FUNCTIONAL_ASSESSMENT: ACTIVITIES ARE NOT PREVENTED

## 2022-09-12 ASSESSMENT — PAIN DESCRIPTION - ONSET
ONSET: ON-GOING
ONSET: ON-GOING

## 2022-09-12 ASSESSMENT — PAIN DESCRIPTION - LOCATION
LOCATION: THROAT

## 2022-09-12 ASSESSMENT — PAIN SCALES - GENERAL
PAINLEVEL_OUTOF10: 8
PAINLEVEL_OUTOF10: 0
PAINLEVEL_OUTOF10: 8
PAINLEVEL_OUTOF10: 7
PAINLEVEL_OUTOF10: 8

## 2022-09-12 ASSESSMENT — PAIN DESCRIPTION - PAIN TYPE
TYPE: ACUTE PAIN;SURGICAL PAIN
TYPE: ACUTE PAIN;SURGICAL PAIN

## 2022-09-12 ASSESSMENT — PAIN DESCRIPTION - FREQUENCY
FREQUENCY: CONTINUOUS
FREQUENCY: CONTINUOUS

## 2022-09-12 ASSESSMENT — LIFESTYLE VARIABLES: SMOKING_STATUS: 0

## 2022-09-12 NOTE — BRIEF OP NOTE
Brief Postoperative Note      Patient: [facility]  YOB: 1960  MRN: [id number]    Date of Procedure: 9/12/2022    Pre-Op Diagnosis: THYROID NODULE    Post-Op Diagnosis: Same       Procedure(s):  LEFT COLLINS THYROIDECTOMY    Surgeon(s):  Mak Dwyer MD    Assistant:  First Assistant: Titus Segal  Resident: Mariah Blank DO    Anesthesia: General    Estimated Blood Loss (mL): less than 50     Complications: None    Specimens:   ID Type Source Tests Collected by Time Destination   A : Left thryoid.  Stitch anterior isthmus Tissue Tissue SURGICAL PATHOLOGY Mak Dwyer MD 9/12/2022 1428        Implants:  * No implants in log *      Drains: * No LDAs found *    Findings: see op report     Electronically signed by Mariah Blank DO on 9/12/2022 at 3:04 PM

## 2022-09-12 NOTE — PROGRESS NOTES
CLINICAL PHARMACY NOTE: MEDS TO BEDS    Total # of Prescriptions Filled: 3   The following medications were delivered to the patient:  Cephalexin 500mg  Ondansetron 4 mg  Norco 5-325 mg    Additional Documentation:     Delivered meds to wife @4:06

## 2022-09-12 NOTE — ANESTHESIA POSTPROCEDURE EVALUATION
Department of Anesthesiology  Postprocedure Note    Patient: Moi Fuentes  MRN: [id number]  YOB: 1960  Date of evaluation: 9/12/2022      Procedure Summary     Date: 09/12/22 Room / Location: [facility] OR 04 / [facility]    Anesthesia Start: 8104 Anesthesia Stop: 2396    Procedure: LEFT COLLINS THYROIDECTOMY (Left) Diagnosis:       Thyroid nodule      (THYROID NODULE)    Surgeons: Domo Saenz MD Responsible Provider: Victoriano Rosas MD    Anesthesia Type: general ASA Status: 3          Anesthesia Type: No value filed.     Loly Phase I: Loly Score: 10    Loly Phase II:        Anesthesia Post Evaluation    Patient location during evaluation: PACU  Patient participation: complete - patient participated  Level of consciousness: awake and alert  Airway patency: patent  Nausea & Vomiting: no nausea and no vomiting  Complications: no  Cardiovascular status: hemodynamically stable  Respiratory status: acceptable  Hydration status: stable

## 2022-09-12 NOTE — PROGRESS NOTES
Discussed D/C instructions with pt and wife. All questions answered. Pt c/o pain. RN administered Ultram at 120. Pt instructed to not take Norco until 2220 is medication needed. Medication instructions written on AVS.  A copy of AVS was provided to pt to take home. ENT notified that pt and wife would like to speak with resident regarding procedure.

## 2022-09-12 NOTE — DISCHARGE INSTRUCTIONS
THYROIDECTOMY DISCHARGE INSTRUCTIONS    Call our office for any questions/concerns and for follow up appointment    Please follow the instructions checked below:    ACTIVITY INSTRUCTIONS:  [x]Increase activity as tolerated    [x]No heavy lifting or strenuous activity     [x]No driving while taking pain medication    WOUND/DRESSING INSTRUCTIONS:  [x]May shower      [x]No sitting in bath tub, hot tub or swimming. [x]Ice to areas of pain for first 24 hours. []Your wound was sealed with a coat of clear acrylic compound called Dermabond. This protects the wound and allows you to take a shower without covering it. Do not apply antibiotic ointment over this acrylic coat; it will peal off by itself in 10 - 15 days. []Apply antibiotic ointment (Bacitracin) on wound at least twice a day and after shower  [x]Drain to be removed in office  []Keep dressing on the drain site intact until removed by surgeon, unless fully soiled. Get instructions for JOSE drain care from nursing staff before leaving hospital.     MEDICATION INSTRUCTIONS:  [x]Take medication as prescribed. [x]When taking pain medications, you may experience dizziness or drowsiness. Do not drink alcohol or drive when taking these medications. [x]You may take Ibuprofen (over the counter) as per directions for mild pain. []Do not take any other acetaminophen (Tylenol) products while taking your pain medication. [x]You may experience constipation while taking pain medication - You may take over the counter stool softeners: docuscate (Colace) or sennosides S (Senokot - S).      WORK:  []You may return to work without restrictions   [x]You may not return to work until after follow up appointment with your physcian    Call physician or go to the ER for any of the following or for questions/concerns:   Fever over 101° F    Redness, swelling, hardness or warmth at the wound site (s)  Unrelieved nausea/vomiting    Foul smelling or cloudy drainage at the wound site This will prevent drainage from flowing back into the tubing and the incision. How often do I need to empty my drain? Usually you empty the drain when it is half full. Most find they need to empty the drain 3 times a day- when you wake up in the morning, mid day, and before bed. If the bulb fills up more than this, you may need to empty the drain more often. How much drainage should there be? The amount of drainage may vary from day to day. It should be less each day. If you increase your activity, it may increase the amount of drainage, temporarily. Call Dr. Monica Marina office when the output is more than 50 ccs/mls in 24 hours. What color should the drainage be? The color will vary. It may go from bright red to pink, then to clear yellow. What do I do with my drainage record? Take it to your follow up visit with your surgeon. May I shower? Yes, 48 hours post-operatively. It may help to secure the drain to an old cloth/robe belt that is around your waist.     When should I call the doctor? Call your doctor if:  You have an elevated temperature (greater than 101 or higher)  The drainage increases or stops suddenly  The drain stitches come loose or break, or if the drain comes out  The area on your skin around the drain gets red, swollen, or painful  The fluid coming out of the drain changes (has pus in it, becomes bright red, or has a bad smell).

## 2022-09-12 NOTE — H&P
Taras Reinoso was seen and re-examined preoperatively today, September 12, 2022. There was no substantial change in his physical and medical status. Patient is fit for the proposed surgical procedure. All questions were appropriately addressed and had no further questions regarding the risks, benefits, and alternatives of the procedure. Taras Reinoso and family wished to proceed.     Hay Yuan DO  Resident Physician  [facility])  Otolaryngology Residency  9/12/2022  12:29 PM

## 2022-09-12 NOTE — OP NOTE
Operative Note      Patient: Marlen Alberto  YOB: 1960  MRN: [id number]    Date of Procedure: 9/12/2022    Pre-Op Diagnosis: THYROID NODULE    Post-Op Diagnosis: Same       Procedure(s):  LEFT COLLINS THYROIDECTOMY    Surgeon(s):  Mary Alexis MD    Assistant:   First Assistant: Shirin Kenney  Resident: Stan Webb DO    Anesthesia: General    Estimated Blood Loss (mL): less than 50     Complications: None    Specimens:   ID Type Source Tests Collected by Time Destination   A : Left thryoid. Stitch anterior isthmus Tissue Tissue SURGICAL PATHOLOGY Mary Alexis MD 9/12/2022 1428        Implants:  * No implants in log *      Drains: * No LDAs found *    Findings: L hemithyroidectomy     Detailed Description of Procedure:       HISTORY: Marlen Alberto is a 65 y.o. male with L micropapillary carcinoma. he presents today for left hemithyroidectomy. The risks and benefits of this procedure were discussed with the patient. The risks including, but not limited to, pain; bleeding; infection; scarring; damage to surrounding structures; recurrence; and the need for further procedures, were explained. The patient acknowledged and understood the risks, and agreed to continue with the procedure. PROCEDURE: The patient was brought in the operating room suite. The patient was placed in the supine position. SCD's were in place, and it was confirmed that she received preoperative antibiotics. After establishment of general anesthesia via orotracheal intubation with a nerve integrity monitoring system endotracheal tube, the eyes were protected with Tegaderm and goggles. Nerve integrity monitoring system endotracheal tube was confirmed to be working adequately and secured. The area of planned incision  Was injected with 8cc of 1% lidocaine with epinepherine . The patient was prepped and draped in a sterile fashion. A #15 blade scalpel was used to make a 5cm incision in the neck.    Subplatysmal flaps were raised to the thyroid notch and sternal notch respectively. Strap muscles were isolated in the midline and dissected and mobilized from the left thyroid lobe . Starting with the left side, careful dissection along the thyroid lobe allowed for identification of the superior thyroid artery and vein which were individually ligated with a bipolar Other bleeding vessels were controlled with double ties and ligation. The inferior and superior parathyroid glands were identified and preserved. The recurrent laryngeal nerve was identified and then preserved. Dissection continued over Berry's ligament to remove the thyroid from the trachea. Once the gland was removed from the surgical bed it was sent off for permanent pathology. Lateral, central and riht thyroid bed palapted for lesions and no abnormalitites appreciated so no additional sugery was done. The wound was copiously irrigated. Hemostatsis was obtained with direct pressure and electrocautery. The platysma was then approximated using 3-0 vicryl suture. 4-0 monocryl suture was used to approximate the deep dermal layer and the skin was closed with 5-0 monocryl in a running subcuticular fashion. Mastasol and steri strips placed. The patient was extubated in the operating room table, sent to the postanesthesia care unit in good condition. There were no complications. All sponge and instrument counts were correct times two.

## 2022-09-12 NOTE — ANESTHESIA PRE PROCEDURE
Department of Anesthesiology  Preprocedure Note       Name:  Zachary Jose   Age:  65 y.o.  :  1960                                          MRN:  [id number]         Date:  2022      Surgeon: Ruben Torrez):  Lars Driscoll MD    Procedure: Procedure(s):  LEFT COLLINS THYROIDECTOMY    Medications prior to admission:   Prior to Admission medications    Medication Sig Start Date End Date Taking? Authorizing Provider   vitamin C (ASCORBIC ACID) 500 MG tablet Take 500 mg by mouth 2 times daily   Yes Historical Provider, MD   magnesium 30 MG tablet Take 25 mg by mouth daily   Yes Historical Provider, MD   EMGALITY 120 MG/ML SOAJ inject every MONTH subcutaneously as directed 22   Historical Provider, MD   vitamin E 1000 units capsule Take 1,000 Units by mouth daily    Historical Provider, MD   vitamin E 400 UNIT capsule Take 400 Units by mouth daily    Historical Provider, MD   Zinc 25 MG TABS Take 1 tablet by mouth daily    Historical Provider, MD   Multiple Vitamins-Minerals (CENTRUM SILVER 50+MEN) TABS Take by mouth    Historical Provider, MD   rosuvastatin (CRESTOR) 5 MG tablet take 1 tablet by mouth once daily 21   Historical Provider, MD   LORazepam (ATIVAN) 0.5 MG tablet lorazepam 0.5 mg tablet    Historical Provider, MD   AIMOVIG 140 MG/ML SOAJ inject 1 milliliter ( 140 milligrams ) subcutaneously Every Month. ..  (REFER TO PRESCRIPTION NOTES).  21   Historical Provider, MD   Rimegepant Sulfate (NURTEC) 75 MG TBDP Take 75 mg by mouth daily as needed     Historical Provider, MD   carBAMazepine (CARBATROL) 300 MG extended release capsule Take 300 mg by mouth 2 times daily Morning and afternoon    Historical Provider, MD   carBAMazepine (CARBATROL) 300 MG extended release capsule Take 600 mg by mouth nightly    Historical Provider, MD   levETIRAcetam (KEPPRA) 500 MG tablet Take 500 mg by mouth 2 times daily Morning and night    Historical Provider, MD   Lacosamide (VIMPAT PO) Take 100 mg by mouth 2 times daily Midday and bedtime    Historical Provider, MD       Current medications:    Current Facility-Administered Medications   Medication Dose Route Frequency Provider Last Rate Last Admin    sodium chloride flush 0.9 % injection 5-40 mL  5-40 mL IntraVENous 2 times per day Dg Savant, DO        sodium chloride flush 0.9 % injection 5-40 mL  5-40 mL IntraVENous PRN Dg Savant, DO        0.9 % sodium chloride infusion   IntraVENous PRN Dg Savant, DO        ceFAZolin (ANCEF) 2,000 mg in sterile water 20 mL IV syringe  2,000 mg IntraVENous On Call to [id number] Natanael Rowan, DO           Allergies: Allergies   Allergen Reactions    Cat Hair Extract Other (See Comments)     congestion       Problem List:  There is no problem list on file for this patient. Past Medical History:        Diagnosis Date    Cancer ([address]     Headache     Hyperlipidemia     Shingles 07/2021    left arm    Trigeminal neuralgia of left side of face 2009       Past Surgical History:        Procedure Laterality Date    LYMPH NODE BIOPSY Left 12/13/2021    EXCISION LEFT AXILLARY LYMPH NODE  **LYMPHOMA PROTOCOL** performed by Merlin Burkitt, MD at [address] [place] [address] TMJ ARTHROSCOPY      VASCULAR SURGERY Left 11/2019    multivascular decompression of trigeminal nerve       Social History:    Social History     Tobacco Use    Smoking status: Never    Smokeless tobacco: Never   Substance Use Topics    Alcohol use:  Yes     Alcohol/week: 1.0 standard drink     Types: 1 Glasses of wine per week                                Counseling given: Not Answered      Vital Signs (Current):   Vitals:    09/07/22 1036 09/12/22 1024   BP:  (!) 133/91   Pulse:  57   Resp:  18   Temp:  98.2 °F (36.8 °C)   TempSrc:  Temporal   SpO2:  96%   Weight: 165 lb (74.8 kg) 165 lb (74.8 kg)   Height:  5' 8\" (1.727 m)                                              BP Readings from Last 3 Encounters:   09/12/22 (!) 133/91   08/29/22 131/89 07/01/22 135/82       NPO Status: Time of last liquid consumption: 0745 (sip w meds)                        Time of last solid consumption: 2000                        Date of last liquid consumption: 09/12/22                        Date of last solid food consumption: 09/11/22    BMI:   Wt Readings from Last 3 Encounters:   09/12/22 165 lb (74.8 kg)   08/29/22 167 lb 6.4 oz (75.9 kg)   07/01/22 170 lb (77.1 kg)     Body mass index is 25.09 kg/m². CBC:   Lab Results   Component Value Date/Time    WBC 4.9 08/29/2022 01:18 PM    RBC 4.52 08/29/2022 01:18 PM    HGB 14.3 08/29/2022 01:18 PM    HCT 41.9 08/29/2022 01:18 PM    MCV 92.7 08/29/2022 01:18 PM    RDW 11.9 08/29/2022 01:18 PM     08/29/2022 01:18 PM       CMP:   Lab Results   Component Value Date/Time     08/29/2022 01:18 PM    K 4.5 08/29/2022 01:18 PM     08/29/2022 01:18 PM    CO2 27 08/29/2022 01:18 PM    BUN 19 08/29/2022 01:18 PM    CREATININE 0.8 08/29/2022 01:18 PM    GFRAA >60 08/29/2022 01:18 PM    LABGLOM >60 08/29/2022 01:18 PM    GLUCOSE 96 08/29/2022 01:18 PM    GLUCOSE 82 11/18/2011 11:09 AM    PROT 7.0 08/29/2022 01:18 PM    CALCIUM 9.3 08/29/2022 01:18 PM    BILITOT 0.4 08/29/2022 01:18 PM    ALKPHOS 68 08/29/2022 01:18 PM    AST 24 08/29/2022 01:18 PM    ALT 18 08/29/2022 01:18 PM       POC Tests: No results for input(s): POCGLU, POCNA, POCK, POCCL, POCBUN, POCHEMO, POCHCT in the last 72 hours.     Coags: No results found for: PROTIME, INR, APTT    HCG (If Applicable): No results found for: PREGTESTUR, PREGSERUM, HCG, HCGQUANT     ABGs: No results found for: PHART, PO2ART, ZUF0DHM, SWV2EAR, BEART, U6JBWHMA     Type & Screen (If Applicable):  No results found for: LABABO, LABRH    Drug/Infectious Status (If Applicable):  No results found for: HIV, HEPCAB    COVID-19 Screening (If Applicable): No results found for: COVID19        Anesthesia Evaluation  Patient summary reviewed and Nursing notes reviewed no history of anesthetic complications:   Airway: Mallampati: II  TM distance: >3 FB   Neck ROM: full  Mouth opening: > = 3 FB   Dental:    (+) caps      Pulmonary:normal exam  breath sounds clear to auscultation  (+) asthma (Allergic to cats: denies recent exacerbations or inhaler usage): allergic asthma,     (-) not a current smoker                           Cardiovascular:Negative CV ROS  Exercise tolerance: good (>4 METS),   (+) hyperlipidemia    Murmur: Grade I.    ECG reviewed  Rhythm: regular  Rate: normal           Beta Blocker:  Not on Beta Blocker      ROS comment: Sinus bradycardia  Otherwise normal ECG  No previous ECGs available     Neuro/Psych:   (+) neuromuscular disease (Trigeminal neuralgia left face: last steroid usage in July (7 day course)):, headaches:, depression/anxiety              ROS comment: migraines GI/Hepatic/Renal: Neg GI/Hepatic/Renal ROS            Endo/Other:    (+) malignancy/cancer. Pt had no PAT visit        ROS comment: Lymphoma. No treatment  Thyroid carcinoma Abdominal:         (-) obese       Vascular: negative vascular ROS. Other Findings:      History of Present Illness- Patient presents with papillary thyroid carcinoma of the left thyroid. 8 x 10 x 7 mm nodule was found on PET scan as patient with history of lymphoma. No B systems and is not requiring treatment at this time for the lymphoma. He reports raspy voice occasionally. No smoking. Social drinking. Pt does have history of trigeminal neuralgia. (ent note)       Anesthesia Plan      general     ASA 3       Induction: intravenous. MIPS: Postoperative opioids intended and Prophylactic antiemetics administered. Anesthetic plan and risks discussed with patient. Plan discussed with CRNA. DOS STAFF ADDENDUM:    Patient seen and chart reviewed. Physical exam and history updated as indicated. NPO status confirmed.   Anesthesia options and plan discussed including risks benefits with patient/legal guardian and family as available. Concerns and questions addressed. Consent verbalized to proceed. Anesthesia plan, options and intraoperative/postoperative concerns discussed with care team.    Caroline Bence, MD, MD  9/12/2022  10:29 AM          Caroline Bence, MD   9/12/2022      Pt condition unchanged. Agree with above note.   JENA Jones - CRNA

## 2022-09-12 NOTE — PROGRESS NOTES
Patient to [facility]. Appropriate monitors placed on patient. Cart locked and in lowest position with side rails up.   Call light within reach

## 2022-09-16 NOTE — PROGRESS NOTES
9/12/22 - LEFT COLLINS THYROIDECTOMY    9/20/22: Pt here for p/o collins thyroidectomy. Lt side internal neck pain, tightness in neck. Very little difficulty swallowing, sore throat. Unable to become high pitched/become loud. Drinks 30oz of water, 2 cups of coffee, and no alcohol. Weight is stable.

## 2022-09-20 ENCOUNTER — OFFICE VISIT (OUTPATIENT)
Dept: ENT CLINIC | Age: 62
End: 2022-09-20
Payer: OTHER GOVERNMENT

## 2022-09-20 VITALS — BODY MASS INDEX: 25.46 KG/M2 | HEIGHT: 68 IN | WEIGHT: 168 LBS

## 2022-09-20 DIAGNOSIS — C73 PAPILLARY THYROID CARCINOMA (HCC): Primary | ICD-10-CM

## 2022-09-20 PROCEDURE — 99213 OFFICE O/P EST LOW 20 MIN: CPT | Performed by: OTOLARYNGOLOGY

## 2022-09-20 NOTE — PROGRESS NOTES
Dear Dr Kendal Longo MD No ref. provider found     We had the pleasure of seeing Jewel Ferrer, 1960 here    on 9/20/2022  Please see below for review of care and plans. Chief complaint-     ICD-10-CM    1. Papillary thyroid carcinoma ([facility] [address]  C73             History of Present Illness- Patient presents with papillary thyroid carcinoma of the left thyroid. 8 x 10 x 7 mm nodule was found on PET scan as patient with history of lymphoma. No B systems and is not requiring treatment at this time for the lymphoma. He reports raspy voice occasionally. No smoking. Social drinking. Pt does have history of trigeminal neuralgia. TSH 2.660    9/20/22: Pt here for p/o hiram thyroidectomy. Lt side internal neck pain, tightness in neck. Very little difficulty swallowing, sore throat. Unable to become high pitched/become loud. Drinks 30oz of water, 2 cups of coffee, and no alcohol. Weight is stable. Review of Systems- No drainage, discharge, or headache. Complete 10 system ROS completed and negative except as noted above. Physical Examination-   Vital Signs-Ht 5' 8\" (1.727 m)   Wt 168 lb (76.2 kg)   BMI 25.54 kg/m²     Ears- Tympanic membranes clear bilaterally. No middle ear effusion. No pre or post auricular tenderness. Nose- Nasal mucosa clear and dry. No significant septal deviation or inferior turbinate hypertrophy. Oral Cavity/Oropharynx- Floor of mouth and tongue are soft and nontender. No posterior pharyngeal erythema. + gag reflex  Neck- Soft and nontender. No masses, lesions, lymphadenopathy, or thyroid nodules appreciated. Well healed surgical incision   Cranial Nerve- Cranial nerves II to XII intact. Extraocular muscles intact. No gross motor visual deficits. No spontaneous nystagmus. Face- No facial skin tenderness to palpation. Heart- No cyanosis, regular  Lungs- No stridor, no intercostal accessory muscle use  General- The patient is in no acute distress.  A&O x3      Scope - done prior  Procedure note- after pt verbally consented, was sprayed nasally with 1:1 neosynephrine and xylocaine. Scope was passed and found nasal cavity with no lesion. np clear, tonsil wnl, tongue mobile and no masses, vocal cords mobile bilaterally with full ab and ad duction. Hypopharynx clear, open and no masses. Left sided TVF nodule with 1-2mm glottic gap                    TSH   Date Value Ref Range Status   08/29/2022 2.930 0.270 - 4.200 uIU/mL Final   05/18/2022 2.660 0.270 - 4.200 uIU/mL Final     Calcium   Date Value Ref Range Status   08/29/2022 9.3 8.6 - 10.2 mg/dL Final   05/18/2022 9.2 8.6 - 10.2 mg/dL Final        Medical Decision Making and Treatment Plan. 1. Patient seen and examined with papillary thyroid cancer of the left lobe. Subcentimeter nodule.-done  2. Discussed left hiram vs total thyroidectomy as nodule less than 1cm. - left hemithyroidectomy done 9/12/22 well healed  3. Pathology discussed of 1.3cm left papillary thyroid, + superior margin, no extrathyroidal extension. Discussed with patient recommendations of completion thyroidectomy, had extensive discussion of observation as this is patients preference vs completion thyroid. He would like to observe at this time and discuss with his other oncology team at his next appt in 11/2022. We will also discuss his case at tumor board. 4. Follow up in 12/2022 after appt with Dr Juliene Rubinstein in 11/21/22. Will scope at next visit if vocal pitch persists     Thank you for the opportunity to take part in the care of this very pleasant patient, Nataly Mahan  Sincerely,            Yue Navarro.  René Acevedo M.D., Ph.D., [facility]   Department of Otolaryngology-Head and Neck Surgery  Chief Head & Neck Surgical Oncology  Director Head & Neck Oncology Service Line

## 2022-10-04 ENCOUNTER — CASE MANAGEMENT (OUTPATIENT)
Dept: ENT CLINIC | Age: 62
End: 2022-10-04

## 2022-10-10 PROBLEM — C73 THYROID CANCER (HCC): Status: ACTIVE | Noted: 2022-10-10

## 2022-11-21 ENCOUNTER — HOSPITAL ENCOUNTER (OUTPATIENT)
Dept: INFUSION THERAPY | Age: 62
Discharge: HOME | End: 2022-11-21
Payer: OTHER GOVERNMENT

## 2022-11-21 ENCOUNTER — OFFICE VISIT (OUTPATIENT)
Dept: ONCOLOGY | Age: 62
End: 2022-11-21
Payer: OTHER GOVERNMENT

## 2022-11-21 VITALS
OXYGEN SATURATION: 99 % | HEIGHT: 68 IN | DIASTOLIC BLOOD PRESSURE: 88 MMHG | HEART RATE: 57 BPM | WEIGHT: 168.9 LBS | BODY MASS INDEX: 25.6 KG/M2 | SYSTOLIC BLOOD PRESSURE: 136 MMHG | TEMPERATURE: 97.4 F

## 2022-11-21 DIAGNOSIS — E04.1 THYROID NODULE: Primary | ICD-10-CM

## 2022-11-21 DIAGNOSIS — C82.94 FOLLICULAR LYMPHOMA OF LYMPH NODES OF UPPER EXTREMITY, UNSPECIFIED GRADE (HCC): Primary | ICD-10-CM

## 2022-11-21 LAB
ALBUMIN SERPL-MCNC: 4.6 G/DL (ref 3.5–5.2)
ALP BLD-CCNC: 67 U/L (ref 40–129)
ALT SERPL-CCNC: 18 U/L (ref 0–40)
ANION GAP SERPL CALCULATED.3IONS-SCNC: 10 MMOL/L (ref 7–16)
AST SERPL-CCNC: 24 U/L (ref 0–39)
BASOPHILS ABSOLUTE: 0.04 E9/L (ref 0–0.2)
BASOPHILS RELATIVE PERCENT: 0.7 % (ref 0–2)
BILIRUB SERPL-MCNC: 0.2 MG/DL (ref 0–1.2)
BUN BLDV-MCNC: 22 MG/DL (ref 6–23)
CALCIUM SERPL-MCNC: 9.3 MG/DL (ref 8.6–10.2)
CHLORIDE BLD-SCNC: 104 MMOL/L (ref 98–107)
CO2: 27 MMOL/L (ref 22–29)
CREAT SERPL-MCNC: 0.9 MG/DL (ref 0.7–1.2)
EOSINOPHILS ABSOLUTE: 0.06 E9/L (ref 0.05–0.5)
EOSINOPHILS RELATIVE PERCENT: 1.1 % (ref 0–6)
GFR SERPL CREATININE-BSD FRML MDRD: >60 ML/MIN/1.73
GLUCOSE BLD-MCNC: 92 MG/DL (ref 74–99)
HCT VFR BLD CALC: 43.1 % (ref 37–54)
HEMOGLOBIN: 14.8 G/DL (ref 12.5–16.5)
IMMATURE GRANULOCYTES #: 0.02 E9/L
IMMATURE GRANULOCYTES %: 0.4 % (ref 0–5)
LACTATE DEHYDROGENASE: 156 U/L (ref 135–225)
LYMPHOCYTES ABSOLUTE: 1.43 E9/L (ref 1.5–4)
LYMPHOCYTES RELATIVE PERCENT: 25.9 % (ref 20–42)
MAGNESIUM: 2 MG/DL (ref 1.6–2.6)
MCH RBC QN AUTO: 32.2 PG (ref 26–35)
MCHC RBC AUTO-ENTMCNC: 34.3 % (ref 32–34.5)
MCV RBC AUTO: 93.9 FL (ref 80–99.9)
MONOCYTES ABSOLUTE: 0.46 E9/L (ref 0.1–0.95)
MONOCYTES RELATIVE PERCENT: 8.3 % (ref 2–12)
NEUTROPHILS ABSOLUTE: 3.51 E9/L (ref 1.8–7.3)
NEUTROPHILS RELATIVE PERCENT: 63.6 % (ref 43–80)
PDW BLD-RTO: 12.2 FL (ref 11.5–15)
PLATELET # BLD: 238 E9/L (ref 130–450)
PMV BLD AUTO: 10.2 FL (ref 7–12)
POTASSIUM SERPL-SCNC: 4.4 MMOL/L (ref 3.5–5)
RBC # BLD: 4.59 E12/L (ref 3.8–5.8)
SODIUM BLD-SCNC: 141 MMOL/L (ref 132–146)
TOTAL PROTEIN: 6.8 G/DL (ref 6.4–8.3)
TSH SERPL DL<=0.05 MIU/L-ACNC: 4.53 UIU/ML (ref 0.27–4.2)
WBC # BLD: 5.5 E9/L (ref 4.5–11.5)

## 2022-11-21 PROCEDURE — 99214 OFFICE O/P EST MOD 30 MIN: CPT | Performed by: INTERNAL MEDICINE

## 2022-11-21 PROCEDURE — 36415 COLL VENOUS BLD VENIPUNCTURE: CPT

## 2022-11-21 PROCEDURE — 83735 ASSAY OF MAGNESIUM: CPT

## 2022-11-21 PROCEDURE — 99213 OFFICE O/P EST LOW 20 MIN: CPT | Performed by: INTERNAL MEDICINE

## 2022-11-21 PROCEDURE — 84443 ASSAY THYROID STIM HORMONE: CPT

## 2022-11-21 PROCEDURE — 85025 COMPLETE CBC W/AUTO DIFF WBC: CPT

## 2022-11-21 PROCEDURE — 83615 LACTATE (LD) (LDH) ENZYME: CPT

## 2022-11-21 PROCEDURE — 80053 COMPREHEN METABOLIC PANEL: CPT

## 2022-11-21 RX ORDER — LEVOTHYROXINE SODIUM 0.03 MG/1
TABLET ORAL
COMMUNITY
Start: 2022-11-11

## 2022-11-21 RX ORDER — TADALAFIL 5 MG/1
TABLET ORAL
COMMUNITY
Start: 2022-11-14

## 2022-11-21 NOTE — PROGRESS NOTES
[id number] [facility]  [place] [address]  Dept: [place]: [phone]  Attending progress note      Reason for Visit:   Non-Hodgkin lymphoma. Referring Physician:  Cici Khalil MD    PCP:  Blanca Chowdhury MD    History of Present Illness:     Mr. Cyndee Gloria is a pleasant 65year old gentleman with a PMHx of trigeminal neuralgia who has been referred to us for follicular lymphoma. He initially had shingles in 7/2021 and felt a lymph node in his axillary area. He does not note progression of size or increase in number. Denies any weight loss, night sweats and low grade fevers. He had CT scan showed lymphadenopathy in the left axilla measuring 11-27 mm. 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 CD10 negative, [facility] has sent for [facility] and molecular sequencing. Lab work shows normal kidney function, no anemia. He also had a colonoscopy done by Dr. 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 11/21/2022. He was recently diagnosed with papillary thyroid carcinoma of the left thyroid measuring 8 x 10 x 7 mm which was detected on PET scan. He underwent L hemithyroidectomy on 9/12/2022 by Dr. Rosio Lujan. Surgical pathology revealed Left thyroid, left hemithyroidectomy: Tumor size: 1.3 cm in greatest dimension. Papillary carcinoma, follicular variant, infiltrative; Carcinoma is present at an inked peripheral margin. Lymphocytic thyroiditis. ENT recommended for completion thyroidectomy as pathology revealed 1.3 cm size, + superior margin, no extrathyroidal extension. He is yet unsure if he wants to undergo completion thyroidectomy and wants to ask for options and ways to monitor it for now.     Currently, he complains of tightness on his neck after the surgery which he notices when he swallows. No dysphagia to liquid or solid noted. He also noticed about a rash on his neck along skin crease associated with on and off pruritus for the past 2 days. No generalized rash or urticaria noted. No new medications or intake of food out of the ordinary. He is also concerned about elevated PSA level at 9 during his latest labs. States that his PSA level has always been on the high normal level but never been above 4. No family history of prostate cancer. He denies fever, night sweats, unintentional weight loss, fatigue, cough, sore throat, SOB on exertion, chest pain, abdominal pain, nausea, vomiting, dysuria, straining on urination, back pain, focal weakness, numbness or tingling. TSH 5.53, on levothyroxine 25 mcg daily, CBC within normal limit except for absolute lymphocyte count of 1.43, but improved from 1.38, CMP within normal limit, magnesium at 2. . Review of Systems;  CONSTITUTIONAL: No fever, chills. Good appetite and energy level. ENMT: Eyes: No diplopia; Nose: No epistaxis. Mouth: No sore throat. RESPIRATORY: No hemoptysis, shortness of breath, cough. CARDIOVASCULAR: No chest pain, palpitations. GASTROINTESTINAL: No nausea/vomiting, abdominal pain, diarrhea/constipation. GENITOURINARY: No dysuria, urinary frequency, hematuria. NEURO: No syncope, presyncope, headache.   Remainder:  ROS NEGATIVE    Past Medical History:      Diagnosis Date    Cancer ([facility] [address]     Headache     Hyperlipidemia     Shingles 07/2021    left arm    Trigeminal neuralgia of left side of face 2009     Patient Active Problem List   Diagnosis    Thyroid cancer [facility])          Past Surgical History:      Procedure Laterality Date    LYMPH NODE BIOPSY Left 12/13/2021    EXCISION LEFT AXILLARY LYMPH NODE  **LYMPHOMA PROTOCOL** performed by Sujit Garcia MD at [address] Left 9/12/2022    LEFT COLLINS THYROIDECTOMY performed by Murphy Whittington MD at [address]    TMJ ARTHROSCOPY      VASCULAR SURGERY Left 11/2019    multivascular decompression of trigeminal nerve       Family History:  Family History   Problem Relation Age of Onset    High Blood Pressure Mother     Other Mother         CHF    Heart Attack Father     Heart Disease Father     Diabetes Father        Medications:  Reviewed and reconciled. Social History:  Social History     Socioeconomic History    Marital status:      Spouse name: Not on file    Number of children: Not on file    Years of education: Not on file    Highest education level: Not on file   Occupational History    Not on file   Tobacco Use    Smoking status: Never    Smokeless tobacco: Never   Vaping Use    Vaping Use: Never used   Substance and Sexual Activity    Alcohol use: Yes     Alcohol/week: 1.0 standard drink     Types: 1 Glasses of wine per week    Drug use: Never    Sexual activity: Not on file   Other Topics Concern    Not on file   Social History Narrative    Not on file     Social Determinants of Health     Financial Resource Strain: Not on file   Food Insecurity: Not on file   Transportation Needs: Not on file   Physical Activity: Not on file   Stress: Not on file   Social Connections: Not on file   Intimate Partner Violence: Not on file   Housing Stability: Not on file       Allergies: Allergies   Allergen Reactions    Cat Hair Extract Other (See Comments)     congestion       Physical Exam:  There were no vitals taken for this visit. GENERAL: Alert, oriented x 3, not in acute distress. HEENT: PERRLA; EOMI. Oropharynx clear. NECK: Supple. No palpable cervical or supraclavicular lymphadenopathy. LUNGS: Good air entry bilaterally. No wheezing, crackles or rhonchi. CARDIOVASCULAR: Regular rate. No murmurs, rubs or gallops. ABDOMEN: Soft. Non-tender, non-distended. Positive bowel sounds. EXTREMITIES: Without clubbing, cyanosis, or edema. NEUROLOGIC: No focal deficits. LYMPHATICS: Palpable left axillary lymphadenopathy. ECOG PS 1         Impression/Plan:     Mr. Sundeep Galindo is a pleasant 65year old gentleman with a PMHx of trigeminal neuralgia who has been referred to us for follicular lymphoma. He initially had shingles in 7/2021 and felt a lymph node in his axillary area. He does not note progression of size or increase in number. Denies any weight loss, night sweats. low grade fevers. He had CT scan showed lymphadenopathy in the left axilla measuring 11-27 mm. 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 CD10 negative, [facility] sent for [facility] and molecular sequencing. Lab work shows normal kidney function, no anemia. He also had a colonoscopy done by Dr. 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 Mr. Nano Chauhan has biopsy confirmed Follicular lymphoma grade 1-2. He has a low-grade disease, discussed his diagnosis and prognosis, he denies B symptoms and lab work so far is within normal limits. He does not have anemia or thrombocytopenia, LDH is not elevated, slides from his colonoscopy with Dr. Lawrence Miles was reviewed by  the pathologist at [address] [place] [address] and comparison to the lymph node biopsy was done, and they were found to be related to the same process, follicular lymphoma, an addendum will be issued. PET scan was done on 2/7/2022, the results/images were reviewed with the patient, it had revealed metabolic activity associated with the left axillary lymphadenopathy, with low level of activity, asymmetric focal increased areas of mild metabolic activity within the marrow space concerning for extranodal disease, the most significant activity is in the L5 vertebral body, focal intense activity associated with a left thyroid nodule.     The patient has stage IV follicular lymphoma, he does not have B symptoms, no bulky disease, he has no cytopenias, LDH is normal, no indication for treatment at this time, recommended continued observation. Newly diagnosed papillary carcinoma, s/p left hemithyroidectomy with Dr. Karen Romero on September 12, 2022. Surgical pathology report: Left thyroid, left hemithyroidectomy: Tumor size: 1.3 cm in greatest dimension. Papillary carcinoma, follicular variant, infiltrative; Carcinoma is present at an inked peripheral margin. Lymphocytic thyroiditis. ENT recommended for completion thyroidectomy as pathology revealed 1.3 cm size, + superior margin, no extrathyroidal extension. The patient has positive margins, completion thyroidectomy is recommended, the patient is reluctant to have the surgery done, will refer the patient to endo for the thyroid cancer and management of the hypothyroidism, TSH is 4.530. Secondary hypothyroidism. TSH 5.53, on levothyroxine 25 mcg daily. Elevated PSA, the patient was referred to urology. RTC in about 3 months. Thank you for allowing us to participate in the care of Mr. Rayna Alcantar.       Sabrina Wood MD  PGY-2 Internal Medicine Resident [facility]    HEMATOLOGY/MEDICAL [address] [address] [address]  [facility] [address]  [facility]: [facility] [phone]

## 2022-11-30 ENCOUNTER — TELEPHONE (OUTPATIENT)
Dept: ENDOCRINOLOGY | Age: 62
End: 2022-11-30

## 2022-12-09 LAB — TSH SERPL DL<=0.05 MIU/L-ACNC: 4.48 UIU/ML

## 2022-12-10 LAB
CREATININE, EXTERNAL: 0.94
LDL CHOLESTEROL, EXTERNAL: 111
TOTAL CHOLESTEROL, EXTERNAL: 218

## 2022-12-12 NOTE — PROGRESS NOTES
9/12/22 - LEFT COLLINS THYROIDECTOMY    12/13/22: Pt here for 3 mo collins thyroidectomy. Discomfort in lower neck when looking upwards, and a slight pinching sensation in lower lefty neck periodically pain. No difficulty swallowing. States has an issue pronouncing certain words and notices a gravel to voice. Drinks 30 oz of water, 2-3 cups of coffee, and occasional alcohol. Weight is stable.

## 2022-12-13 ENCOUNTER — OFFICE VISIT (OUTPATIENT)
Dept: ENT CLINIC | Age: 62
End: 2022-12-13
Payer: OTHER GOVERNMENT

## 2022-12-13 VITALS — HEIGHT: 68 IN | WEIGHT: 171.7 LBS | BODY MASS INDEX: 26.02 KG/M2

## 2022-12-13 DIAGNOSIS — R49.0 DYSPHONIA: ICD-10-CM

## 2022-12-13 DIAGNOSIS — C73 PAPILLARY THYROID CARCINOMA (HCC): ICD-10-CM

## 2022-12-13 DIAGNOSIS — J38.2 VOCAL CORD NODULE: Primary | ICD-10-CM

## 2022-12-13 PROCEDURE — 99214 OFFICE O/P EST MOD 30 MIN: CPT | Performed by: OTOLARYNGOLOGY

## 2022-12-13 NOTE — PROGRESS NOTES
Dear Dr Jt Minor MD No ref. provider found     We had the pleasure of seeing Letty Galdamez, 1960 here    on 12/13/2022  Please see below for review of care and plans. Chief complaint-   No diagnosis found. 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 8 x 10 x 7 mm nodule was found on PET scan as patient with history of lymphoma. No B systems and is not requiring treatment at this time for the lymphoma. He reports raspy voice occasionally. No smoking. Social drinking. Pt does have history of trigeminal neuralgia. TSH 2.660    9/20/22: Pt here for p/o hiram thyroidectomy. Lt side internal neck pain, tightness in neck. Very little difficulty swallowing, sore throat. Unable to become high pitched/become loud. Drinks 30oz of water, 2 cups of coffee, and no alcohol. Weight is stable. 12/13/22: Pt here for 3 mo hiram thyroidectomy. Discomfort in lower neck when looking upwards, and a slight pinching sensation in lower lefty neck periodically pain. No difficulty swallowing. States has an issue pronouncing certain words and notices a gravel to voice. Drinks 30 oz of water, 2-3 cups of coffee, and occasional alcohol. Weight is stable. Review of Systems- No drainage, discharge, or headache. Complete 10 system ROS completed and negative except as noted above. Physical Examination-   Vital Signs-Ht 5' 8\" (1.727 m)   Wt 171 lb 11.2 oz (77.9 kg)   BMI 26.11 kg/m²     Ears- Tympanic membranes clear bilaterally. No middle ear effusion. No pre or post auricular tenderness. Nose- Nasal mucosa clear and dry. No significant septal deviation or inferior turbinate hypertrophy. Oral Cavity/Oropharynx- Floor of mouth and tongue are soft and nontender. No posterior pharyngeal erythema. + gag reflex  Neck- Soft and nontender. No masses, lesions, lymphadenopathy, or thyroid nodules appreciated.  Well healed surgical incision   Cranial Nerve- Cranial nerves II to XII intact. Extraocular muscles intact. No gross motor visual deficits. No spontaneous nystagmus. Face- No facial skin tenderness to palpation. Heart- No cyanosis, regular  Lungs- No stridor, no intercostal accessory muscle use  General- The patient is in no acute distress. A&O x3      Scope - 12/13/22  Procedure note- after pt verbally consented, was sprayed nasally with 1:1 neosynephrine and xylocaine. Scope was passed and found nasal cavity with no lesion. np clear, tonsil wnl, tongue mobile and no masses, vocal cords mobile bilaterally with full ab and ad duction. Hypopharynx clear, open and no masses. Left sided TVF nodule with 1-2mm glottic gap- stable                    TSH   Date Value Ref Range Status   11/21/2022 4.530 (H) 0.270 - 4.200 uIU/mL Final   08/29/2022 2.930 0.270 - 4.200 uIU/mL Final     Calcium   Date Value Ref Range Status   11/21/2022 9.3 8.6 - 10.2 mg/dL Final   08/29/2022 9.3 8.6 - 10.2 mg/dL Final        Medical Decision Making and Treatment Plan. 1. Patient seen and examined with papillary thyroid cancer of the left lobe. Subcentimeter nodule.-done  2. Discussed left hiram vs total thyroidectomy as nodule less than 1cm. - left hemithyroidectomy done 9/12/22 well healed  3. Pathology discussed of 1.3cm left papillary thyroid, + superior margin, no extrathyroidal extension. Discussed with patient recommendations of completion thyroidectomy, had extensive discussion of observation as this is patients preference vs completion thyroid. He would like to observe at this time and discuss with his other oncology team at his next appt in 11/2022. We will also discuss his case at tumor board. 4. Follow up in 12/2022 after appt with Dr Franklin Charles in 11/21/22. - done- she also recommended surgery. 5. Has other appointments he would like to get done first and then fu back with us regarding if he wishes surgery.   All of his and his wifes questions were answered. Thank you for the opportunity to take part in the care of this very pleasant patient, Per Santos  Sincerely,          I spent 35 minutes with the patients care and >50% of this time on counseling or coordinating care      Carrerobessy Esparza.  Lencho Gleason M.D., Ph.D., [address] [place] [address]   Department of Otolaryngology-Head and Neck Surgery  Chief Head & Neck Surgical Oncology  Director Head & Neck Oncology Service Line

## 2023-01-23 ENCOUNTER — OFFICE VISIT (OUTPATIENT)
Dept: ENDOCRINOLOGY | Age: 63
End: 2023-01-23
Payer: OTHER GOVERNMENT

## 2023-01-23 VITALS
HEART RATE: 55 BPM | DIASTOLIC BLOOD PRESSURE: 82 MMHG | BODY MASS INDEX: 26.15 KG/M2 | OXYGEN SATURATION: 98 % | WEIGHT: 172 LBS | SYSTOLIC BLOOD PRESSURE: 143 MMHG

## 2023-01-23 DIAGNOSIS — E89.0 POSTOPERATIVE HYPOTHYROIDISM: ICD-10-CM

## 2023-01-23 DIAGNOSIS — C73 THYROID CANCER (HCC): ICD-10-CM

## 2023-01-23 DIAGNOSIS — C73 THYROID CANCER (HCC): Primary | ICD-10-CM

## 2023-01-23 DIAGNOSIS — E04.1 THYROID NODULE: ICD-10-CM

## 2023-01-23 PROCEDURE — 99214 OFFICE O/P EST MOD 30 MIN: CPT | Performed by: INTERNAL MEDICINE

## 2023-01-23 RX ORDER — LEVOTHYROXINE SODIUM 0.05 MG/1
50 TABLET ORAL DAILY
Qty: 30 TABLET | Refills: 5 | Status: SHIPPED
Start: 2023-01-23 | End: 2023-01-25

## 2023-01-23 NOTE — PROGRESS NOTES
[address] [place] [address] Department of Endocrinology Diabetes and Metabolism   [address] [facility] [address]   Phone: [phone]  Fax: [phone]    Date of Service: 1/23/2023    Primary Care Physician: Sameer Thorne MD  Referring physician: Shira Bowens MD  Provider: Praveen Almaraz MD     Reason for the visit:  Papillary thyroid cancer     History of Present Illness: The history is provided by the patient. No  was used. Accuracy of the patient data is excellent. Ulisses Pink is a very pleasant 65 y.o. male seen today for thyroid nodule    Patient here for evaluation of thyroid nodule 1st dx on   Context:Thyroid nodule  Incidentally discovered  On CT of chest 11/21  PET scan 2/22 showed focal intense activity associated with the left thyroid nodule  Thyroid US --> Single nodule left middle lobe 8 x 10 x 7 mm, solid, isoechoic, no microcalcification    FNA to Lt side thyroid nodule 5/2022 --> + for [facility]     9/12/2022 - Pt underwent Lt thyroid lobectomy + isthmusectomy   Left lobectomy with isthmusectomy (hemithyroidectomy)   Unifocal Papillary thyroid cancer, measuring 1.3 x 1 x 0.7 cm  in greatest dimension, Angioinvasion (vascular invasion): Not identified, Lymphatic invasion: Not identified   Extrathyroidal extension: Not identified, however, tumor is present, involving fat at an inked margin , Margin status: Margin involved by carcinoma, Specify involved margins: Left superior pole, Regional lymph node status: Not applicable (no regional lymph nodes submitted or found).  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 Left 9/12/2022    LEFT COLLINS THYROIDECTOMY performed by Lashell Shields MD at [address]    TMJ ARTHROSCOPY      VASCULAR SURGERY Left 11/2019    multivascular decompression of trigeminal nerve       SOCIAL HISTORY   Tobacco:   reports that he has never smoked. He has never used smokeless tobacco.  Alcohol:   reports current alcohol use of about 1.0 standard drink per week. Drugs:   reports no history of drug use.     FAMILY HISTORY   Family History   Problem Relation Age of Onset    High Blood Pressure Mother     Other Mother         CHF    Heart Attack Father     Heart Disease Father     Diabetes Father        ALLERGIES AND DRUG REACTIONS   Allergies   Allergen Reactions    Cat Hair Extract Other (See Comments)     congestion       CURRENT MEDICATIONS   Current Outpatient Medications   Medication Sig Dispense Refill    levothyroxine (SYNTHROID) 50 MCG tablet Take 1 tablet by mouth daily 30 tablet 5    carBAMazepine (CARBATROL) 300 MG extended release capsule Take 600 mg by mouth nightly      tadalafil (CIALIS) 5 MG tablet take 1 tablet by mouth once daily      ondansetron (ZOFRAN ODT) 4 MG disintegrating tablet Place 1 tablet under the tongue every 8 hours as needed for Nausea or Vomiting 15 tablet 0    vitamin C (ASCORBIC ACID) 500 MG tablet Take 500 mg by mouth 2 times daily      magnesium 30 MG tablet Take 25 mg by mouth daily      EMGALITY 120 MG/ML SOAJ inject every MONTH subcutaneously as directed      vitamin E 1000 units capsule Take 1,000 Units by mouth daily      vitamin E 400 UNIT capsule Take 400 Units by mouth daily      Zinc 25 MG TABS Take 1 tablet by mouth daily Multiple Vitamins-Minerals (CENTRUM SILVER 50+MEN) TABS Take by mouth      rosuvastatin (CRESTOR) 5 MG tablet take 1 tablet by mouth once daily      LORazepam (ATIVAN) 0.5 MG tablet lorazepam 0.5 mg tablet      AIMOVIG 140 MG/ML SOAJ inject 1 milliliter ( 140 milligrams ) subcutaneously Every Month...  (REFER TO PRESCRIPTION NOTES). (Patient not taking: Reported on 12/13/2022)      Rimegepant Sulfate (NURTEC) 75 MG TBDP Take 75 mg by mouth daily as needed       carBAMazepine (CARBATROL) 300 MG extended release capsule Take 300 mg by mouth 2 times daily Morning and afternoon      levETIRAcetam (KEPPRA) 500 MG tablet Take 500 mg by mouth 2 times daily Morning and night      Lacosamide (VIMPAT PO) Take 100 mg by mouth 2 times daily Midday and bedtime       No current facility-administered medications for this visit.       Review of Systems  Constitutional: No fever, no chills, no diaphoresis, no generalized weakness.  HEENT: No blurred vision, No sore throat, no ear pain, no hair loss  Neck: denied any neck swelling, difficulty swallowing,   Cardio-pulmonary: No CP, SOB or palpitation, No orthopnea or PND. No cough or wheezing.  GI: No N/V/D, no constipation, No abdominal pain, no melena or hematochezia   : Denied any dysuria, hematuria, flank pain, discharge, or incontinence.   Skin: denied any rash, ulcer, Hirsute, or hyperpigmentation.   MSK: denied any joint deformity, joint pain/swelling, muscle pain, or back pain.  Neuro: no numbness, no tingling, no weakness, _    OBJECTIVE    BP (!) 143/82   Pulse 55   Wt 172 lb (78 kg)   SpO2 98%   BMI 26.15 kg/m²   BP Readings from Last 4 Encounters:   01/23/23 (!) 143/82   11/21/22 136/88   09/12/22 (!) 132/94   08/29/22 131/89     Wt Readings from Last 6 Encounters:   01/23/23 172 lb (78 kg)   12/13/22 171 lb 11.2 oz (77.9 kg)   11/21/22 168 lb 14.4 oz (76.6 kg)   09/20/22 168 lb (76.2 kg)   09/12/22 165 lb (74.8 kg)   08/29/22 167 lb 6.4 oz (75.9 kg)       Physical  examination:  General: awake alert, oriented x3, no abnormal position or movements. HEENT: normocephalic non-traumatic, no exophthalmos   Neck: supple, no LN enlargement, s/p partial thyroidectomy, no JVD. Pulm: Clear equal air entry no added sounds, no wheezing or rhonchi    CVS: S1 + S2, no murmur, no heave. Abd: soft lax, no tenderness, no organomegaly, audible bowel sounds. Skin: warm, no lesions, no rash. Musculoskeletal: No back tenderness, no kyphosis/scoliosis    Neuro: CN intact,  , muscle power normal  Psych: normal mood, and affect      Review of Laboratory Data:  I personally reviewed the following lab:  Lab Results   Component Value Date/Time    WBC 5.5 11/21/2022 12:16 PM    RBC 4.59 11/21/2022 12:16 PM    HGB 14.8 11/21/2022 12:16 PM    HCT 43.1 11/21/2022 12:16 PM    MCV 93.9 11/21/2022 12:16 PM    MCH 32.2 11/21/2022 12:16 PM    MCHC 34.3 11/21/2022 12:16 PM    RDW 12.2 11/21/2022 12:16 PM     11/21/2022 12:16 PM    MPV 10.2 11/21/2022 12:16 PM      Lab Results   Component Value Date/Time     11/21/2022 12:16 PM    K 4.4 11/21/2022 12:16 PM    CO2 27 11/21/2022 12:16 PM    BUN 22 11/21/2022 12:16 PM    CREATININE 0.9 11/21/2022 12:16 PM    CALCIUM 9.3 11/21/2022 12:16 PM    LABGLOM >60 11/21/2022 12:16 PM    GFRAA >60 08/29/2022 01:18 PM      Lab Results   Component Value Date/Time    TSH 4.530 (H) 11/21/2022 12:16 PM     Lab Results   Component Value Date/Time    GLUCOSE 92 11/21/2022 12:16 PM    GLUCOSE 82 11/18/2011 11:09 AM     No results found for: LABA1C  Lab Results   Component Value Date/Time    TRIG 75 04/30/2021 08:41 AM    HDL 80 04/30/2021 08:41 AM    LDLCALC 83 04/30/2021 08:41 AM    CHOL 178 04/30/2021 08:41 AM     No results found for: [address] [place] [address] Street, a 65 y.o.-old male seen in for the following issues   Assessment:      Diagnosis Orders   1.  Thyroid cancer ([facility] [address]  TSH    T4, Free    DISCONTINUED: levothyroxine (SYNTHROID) 50 MCG tablet      2. Thyroid nodule  TSH      3. Postoperative hypothyroidism  TSH    T4, Free    DISCONTINUED: levothyroxine (SYNTHROID) 50 MCG tablet          Plan:     1. Papillary thyroid cancer   Pt s/p Lt thyroid lobectomy and isthmusectomy 9/12/2022  Pathology report --> 1.3 x 1 x 0.7 cm  PTC, Margin status: Margin involved by carcinoma  Pt going for completion thyroidectomy in few weeks   Will likely proceed with [place] ablation      2. Postoperative Hypothyroidism   On levothyroxine 50 mcg daily      2. Follicular lymphoma  Following with hematology/oncology    I personally reviewed external notes from PCP and other patient's care team providers, and personally interpreted labs associated with the above diagnosis. I also ordered labs to further assess and manage the above addressed medical conditions    Return in about 3 months (around 4/23/2023) for thyroid cancer, hypothyroidism. The above issues were reviewed with the patient who understood and agreed with the plan. Thank you for allowing us to participate in the care of this patient. Please do not hesitate to contact us with any additional questions. Diagnosis Orders   1. Thyroid cancer ([address]  TSH    T4, Free    DISCONTINUED: levothyroxine (SYNTHROID) 50 MCG tablet      2. Thyroid nodule  TSH      3. Postoperative hypothyroidism  TSH    T4, Free    DISCONTINUED: levothyroxine (SYNTHROID) 50 MCG tablet        Shakir Henley MD  [facility] and Endocrinology   [address] [place] [address], [address] [place] [address],[facility] [address]   Phone: [phone]  Fax: [phone]  --------------------------------------------  An electronic signature was used to authenticate this note.  Zenon Clark MD on 1/23/2023 at 12:30 PM

## 2023-01-25 ENCOUNTER — PREP FOR PROCEDURE (OUTPATIENT)
Dept: ENT CLINIC | Age: 63
End: 2023-01-25

## 2023-01-25 ENCOUNTER — TELEPHONE (OUTPATIENT)
Dept: ENDOCRINOLOGY | Age: 63
End: 2023-01-25

## 2023-01-25 ENCOUNTER — TELEPHONE (OUTPATIENT)
Dept: ENT CLINIC | Age: 63
End: 2023-01-25

## 2023-01-25 DIAGNOSIS — E89.0 POSTOPERATIVE HYPOTHYROIDISM: Primary | ICD-10-CM

## 2023-01-25 PROBLEM — E04.1 THYROID NODULE: Status: ACTIVE | Noted: 2023-01-25

## 2023-01-25 RX ORDER — LEVOTHYROXINE SODIUM 0.07 MG/1
75 TABLET ORAL DAILY
Qty: 30 TABLET | Refills: 5 | Status: SHIPPED | OUTPATIENT
Start: 2023-01-25

## 2023-01-25 NOTE — TELEPHONE ENCOUNTER
Endocrine staff,   Please notify pt that I have reviewed your recent results.     Thyroid level slightly below goa, I recommend change Levothyroxine from 50  to 75 mcg daily and stay on this until your  thyroid surgery

## 2023-01-25 NOTE — TELEPHONE ENCOUNTER
Prior Authorization Form:      DEMOGRAPHICS:                     Patient Name:  Ernestina Arnett  Patient :  1960            Insurance:  Payor: Jovi Jayw / Plan: [employer] [address] / Product Type: *No Product type* /   Insurance ID Number:    Payer/Plan Subscr  Sex Relation Sub. Ins. ID Effective Group Num   1.  [facility] 1960 Male Self [id number] 3/17/21 [id number]                                   [address]         DIAGNOSIS & PROCEDURE:                       Procedure/Operation: THYROIDECTOMY COMPLETION           CPT Code: 63764    Diagnosis:  THYROID NODULE    ICD10 Code: E04.1    Location:  [facility]    Surgeon:  DR Villa Modest INFORMATION:                          Date: 23    Time: N/A              Anesthesia:  General                                                       Status:  Outpatient        Special Comments:  NERVE INTEGRITY MONITOR       Electronically signed by Svetlana Beebe MA on 2023 at 12:54 PM

## 2023-02-16 ENCOUNTER — OFFICE VISIT (OUTPATIENT)
Dept: ONCOLOGY | Age: 63
End: 2023-02-16
Payer: OTHER GOVERNMENT

## 2023-02-16 ENCOUNTER — HOSPITAL ENCOUNTER (OUTPATIENT)
Dept: INFUSION THERAPY | Age: 63
Discharge: HOME | End: 2023-02-16
Payer: OTHER GOVERNMENT

## 2023-02-16 VITALS
DIASTOLIC BLOOD PRESSURE: 82 MMHG | WEIGHT: 168.9 LBS | TEMPERATURE: 98 F | BODY MASS INDEX: 25.6 KG/M2 | HEART RATE: 50 BPM | HEIGHT: 68 IN | OXYGEN SATURATION: 97 % | SYSTOLIC BLOOD PRESSURE: 132 MMHG

## 2023-02-16 DIAGNOSIS — C73 THYROID CANCER (HCC): ICD-10-CM

## 2023-02-16 DIAGNOSIS — C82.94 FOLLICULAR LYMPHOMA OF LYMPH NODES OF UPPER EXTREMITY, UNSPECIFIED GRADE (HCC): ICD-10-CM

## 2023-02-16 DIAGNOSIS — C82.94 FOLLICULAR LYMPHOMA OF LYMPH NODES OF UPPER EXTREMITY, UNSPECIFIED GRADE (HCC): Primary | ICD-10-CM

## 2023-02-16 LAB
ALBUMIN SERPL-MCNC: 4.6 G/DL (ref 3.5–5.2)
ALP BLD-CCNC: 61 U/L (ref 40–129)
ALT SERPL-CCNC: 15 U/L (ref 0–40)
ANION GAP SERPL CALCULATED.3IONS-SCNC: 8 MMOL/L (ref 7–16)
AST SERPL-CCNC: 20 U/L (ref 0–39)
BASOPHILS ABSOLUTE: 0.06 E9/L (ref 0–0.2)
BASOPHILS RELATIVE PERCENT: 1 % (ref 0–2)
BILIRUB SERPL-MCNC: <0.2 MG/DL (ref 0–1.2)
BUN BLDV-MCNC: 17 MG/DL (ref 6–23)
CALCIUM SERPL-MCNC: 9.5 MG/DL (ref 8.6–10.2)
CHLORIDE BLD-SCNC: 97 MMOL/L (ref 98–107)
CO2: 28 MMOL/L (ref 22–29)
CREAT SERPL-MCNC: 0.8 MG/DL (ref 0.7–1.2)
EOSINOPHILS ABSOLUTE: 0.08 E9/L (ref 0.05–0.5)
EOSINOPHILS RELATIVE PERCENT: 1.4 % (ref 0–6)
GFR SERPL CREATININE-BSD FRML MDRD: >60 ML/MIN/1.73
GLUCOSE BLD-MCNC: 92 MG/DL (ref 74–99)
HCT VFR BLD CALC: 42.7 % (ref 37–54)
HEMOGLOBIN: 14.4 G/DL (ref 12.5–16.5)
IMMATURE GRANULOCYTES #: 0.02 E9/L
IMMATURE GRANULOCYTES %: 0.3 % (ref 0–5)
LACTATE DEHYDROGENASE: 166 U/L (ref 135–225)
LYMPHOCYTES ABSOLUTE: 1.56 E9/L (ref 1.5–4)
LYMPHOCYTES RELATIVE PERCENT: 27.2 % (ref 20–42)
MAGNESIUM: 2.2 MG/DL (ref 1.6–2.6)
MCH RBC QN AUTO: 31.3 PG (ref 26–35)
MCHC RBC AUTO-ENTMCNC: 33.7 % (ref 32–34.5)
MCV RBC AUTO: 92.8 FL (ref 80–99.9)
MONOCYTES ABSOLUTE: 0.69 E9/L (ref 0.1–0.95)
MONOCYTES RELATIVE PERCENT: 12 % (ref 2–12)
NEUTROPHILS ABSOLUTE: 3.33 E9/L (ref 1.8–7.3)
NEUTROPHILS RELATIVE PERCENT: 58.1 % (ref 43–80)
PDW BLD-RTO: 11.6 FL (ref 11.5–15)
PLATELET # BLD: 246 E9/L (ref 130–450)
PMV BLD AUTO: 10 FL (ref 7–12)
POTASSIUM SERPL-SCNC: 4.3 MMOL/L (ref 3.5–5)
RBC # BLD: 4.6 E12/L (ref 3.8–5.8)
SODIUM BLD-SCNC: 133 MMOL/L (ref 132–146)
TOTAL PROTEIN: 6.9 G/DL (ref 6.4–8.3)
TSH SERPL DL<=0.05 MIU/L-ACNC: 2.03 UIU/ML (ref 0.27–4.2)
WBC # BLD: 5.7 E9/L (ref 4.5–11.5)

## 2023-02-16 PROCEDURE — 99214 OFFICE O/P EST MOD 30 MIN: CPT | Performed by: INTERNAL MEDICINE

## 2023-02-16 PROCEDURE — 80053 COMPREHEN METABOLIC PANEL: CPT

## 2023-02-16 PROCEDURE — 83735 ASSAY OF MAGNESIUM: CPT

## 2023-02-16 PROCEDURE — 83615 LACTATE (LD) (LDH) ENZYME: CPT

## 2023-02-16 PROCEDURE — 84443 ASSAY THYROID STIM HORMONE: CPT

## 2023-02-16 PROCEDURE — 36415 COLL VENOUS BLD VENIPUNCTURE: CPT

## 2023-02-16 PROCEDURE — 85025 COMPLETE CBC W/AUTO DIFF WBC: CPT

## 2023-02-16 NOTE — PROGRESS NOTES
[id number] [facility]  [place] [address]  Dept: [place]: [phone]  Attending progress note      Reason for Visit:   Non-Hodgkin lymphoma. Referring Physician:  Beto Christina MD    PCP:  Berto Adan MD    History of Present Illness:     Mr. Marisel Rodarte is a pleasant 65year old gentleman with a PMHx of trigeminal neuralgia who has been referred to us for follicular lymphoma. He initially had shingles in 7/2021 and felt a lymph node in his axillary area. He does not note progression of size or increase in number. Denies any weight loss, night sweats and low grade fevers. He had CT scan showed lymphadenopathy in the left axilla measuring 11-27 mm. 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 CD10 negative, [facility] has sent for [facility] and molecular sequencing. Lab work shows normal kidney function, no anemia. He also had a colonoscopy done by Dr. 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 He underwent L hemithyroidectomy on 9/12/2022 by Dr. Danilo Gale. Surgical pathology revealed Left thyroid, left hemithyroidectomy: Tumor size: 1.3 cm in greatest dimension. Papillary carcinoma, follicular variant, infiltrative; Carcinoma is present at an inked peripheral margin. Lymphocytic thyroiditis. ENT recommended for completion thyroidectomy as pathology revealed 1.3 cm size, + superior margin, no extrathyroidal extension. Since his last visit, the patient was seen by Dr. Danilo Gale and Dr. Britton Bryan, and is scheduled for patient today for ectomy followed by radioactive iodine.   The patient that she denies sweats, or fever. Feeling well in general.      Review of Systems;  CONSTITUTIONAL: No fever, chills. Good appetite and energy level. ENMT: Eyes: No diplopia; Nose: No epistaxis. Mouth: No sore throat. RESPIRATORY: No hemoptysis, shortness of breath, cough. CARDIOVASCULAR: No chest pain, palpitations. GASTROINTESTINAL: No nausea/vomiting, abdominal pain, diarrhea/constipation. GENITOURINARY: No dysuria, urinary frequency, hematuria. NEURO: No syncope, presyncope, headache. Remainder:  ROS NEGATIVE    Past Medical History:      Diagnosis Date    Cancer ([facility] [address]     Headache     Hyperlipidemia     Shingles 07/2021    left arm    Trigeminal neuralgia of left side of face 2009     Patient Active Problem List   Diagnosis    Thyroid cancer ([facility]    Thyroid nodule          Past Surgical History:      Procedure Laterality Date    LYMPH NODE BIOPSY Left 12/13/2021    EXCISION LEFT AXILLARY LYMPH NODE  **LYMPHOMA PROTOCOL** performed by Daisy Cardenas MD at [address] Left 9/12/2022    LEFT OCLLINS THYROIDECTOMY performed by Lilly Johnson MD at [address] [place] [address]    TMJ ARTHROSCOPY      VASCULAR SURGERY Left 11/2019    multivascular decompression of trigeminal nerve       Family History:  Family History   Problem Relation Age of Onset    High Blood Pressure Mother     Other Mother         CHF    Heart Attack Father     Heart Disease Father     Diabetes Father        Medications:  Reviewed and reconciled. Social History:  Social History     Socioeconomic History    Marital status:      Spouse name: Not on file    Number of children: Not on file    Years of education: Not on file    Highest education level: Not on file   Occupational History    Not on file   Tobacco Use    Smoking status: Never    Smokeless tobacco: Never   Vaping Use    Vaping Use: Never used   Substance and Sexual Activity    Alcohol use:  Yes     Alcohol/week: 1.0 standard drink     Types: 1 Glasses of wine per week Drug use: Never    Sexual activity: Not on file   Other Topics Concern    Not on file   Social History Narrative    Not on file     Social Determinants of Health     Financial Resource Strain: Not on file   Food Insecurity: Not on file   Transportation Needs: Not on file   Physical Activity: Not on file   Stress: Not on file   Social Connections: Not on file   Intimate Partner Violence: Not on file   Housing Stability: Not on file       Allergies: Allergies   Allergen Reactions    Cat Hair Extract Other (See Comments)     congestion       Physical Exam:  /82   Pulse 50   Temp 98 °F (36.7 °C)   Ht 5' 8\" (1.727 m)   Wt 168 lb 14.4 oz (76.6 kg)   SpO2 97%   BMI 25.68 kg/m²   GENERAL: Alert, oriented x 3, not in acute distress. HEENT: PERRLA; EOMI. Oropharynx clear. NECK: Supple. No palpable cervical or supraclavicular lymphadenopathy. LUNGS: Good air entry bilaterally. No wheezing, crackles or rhonchi. CARDIOVASCULAR: Regular rate. No murmurs, rubs or gallops. ABDOMEN: Soft. Non-tender, non-distended. Positive bowel sounds. EXTREMITIES: Without clubbing, cyanosis, or edema. NEUROLOGIC: No focal deficits. LYMPHATICS: Palpable left axillary lymphadenopathy. ECOG PS 1         Impression/Plan:     Mr. Belia Cruz is a pleasant 65year old gentleman with a PMHx of trigeminal neuralgia who has been referred to us for follicular lymphoma. He initially had shingles in 7/2021 and felt a lymph node in his axillary area. He does not note progression of size or increase in number. Denies any weight loss, night sweats. low grade fevers. He had CT scan showed lymphadenopathy in the left axilla measuring 11-27 mm. 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 CD10 negative, [facility] sent for [facility] and molecular sequencing. Lab work shows normal kidney function, no anemia.  He also had a colonoscopy done by Dr. 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 Mr. Josh Huddleston has biopsy confirmed Follicular lymphoma grade 1-2. He has a low-grade disease, discussed his diagnosis and prognosis, he denies B symptoms and lab work so far is within normal limits. He does not have anemia or thrombocytopenia, LDH is not elevated, slides from his colonoscopy with Dr. Zuhair Miller was reviewed by  the pathologist at [address] [place] [address] and comparison to the lymph node biopsy was done, and they were found to be related to the same process, follicular lymphoma, an addendum will be issued. PET scan was done on 2/7/2022, the results/images were reviewed with the patient, it had revealed metabolic activity associated with the left axillary lymphadenopathy, with low level of activity, asymmetric focal increased areas of mild metabolic activity within the marrow space concerning for extranodal disease, the most significant activity is in the L5 vertebral body, focal intense activity associated with a left thyroid nodule. The patient has stage IV follicular lymphoma, he does not have B symptoms, no bulky disease, labs reviewed, he does not have any cytopenias, LDH is normal, no indication for treatment at this time, recommended continued observation. Newly diagnosed papillary carcinoma, s/p left hemithyroidectomy with Dr. Brice Mariano on September 12, 2022. Surgical pathology report: Left thyroid, left hemithyroidectomy: Tumor size: 1.3 cm in greatest dimension. Papillary carcinoma, follicular variant, infiltrative; Carcinoma is present at an inked peripheral margin. Lymphocytic thyroiditis. ENT recommended for completion thyroidectomy as pathology revealed 1.3 cm size, + superior margin, no extrathyroidal extension.   The patient has positive margins, the patient thyroidectomy is recommended, the patient is also recommended to, he will follow-up with Dr. Audra Favre after surgery. Secondary hypothyroidism. Levothyroxine, continue follow-up with Endo. Elevated PSA, the patient was seen by urology, he saw Dr. Naveen Upton, he had an MRI done, according to the patient was not concerning, I requested the records. RTC in about 3 months. Thank you for allowing us to participate in the care of Mr. Reynaldo Johnson.       Amanda Melo MD  PGY-2 Internal Medicine Resident [facility]    HEMATOLOGY/MEDICAL [address]  [facility] [address]  Dept: [facility]: [phone]

## 2023-02-16 NOTE — PROGRESS NOTES
We are still attempting to obtain the EKG from Dr. Andrea Watson office. I asked a repeat fax attempt. I also asked them to fax to Dr. Elías Bland also. I notified the office it may come, could they scan into media.

## 2023-02-21 NOTE — PROGRESS NOTES
[address]   PRE-ADMISSION TESTING GENERAL INSTRUCTIONS  PAT Phone Number: [phone]      GENERAL INSTRUCTIONS:    [x] Antibacterial Soap Shower Night before and/or AM of surgery. [] CHG Wipes instruction sheet and wipes given. [] Hibiclens shower the night before and the morning of surgery.   -Do not use Hibiclens on your face or head. [x] Do not wear lotions, powders, deodorant. [x] Nothing by mouth after midnight; including gum, candy, mints, or water. [x] You may brush your teeth, gargle, but do NOT swallow water. [x] No tobacco products, illegal drugs, or alcohol within 24 hours of your surgery. [x] Jewelry or valuables should not be brought to the hospital. All body and/or tongue piercing's must be removed prior to arriving to hospital. No contact lens or hair pins. [x] Arrange transportation with a responsible adult  to and from the hospital. Arrange for someone to be with you for the remainder of the day and for 24 hours after your procedure due to having had anesthesia. -Who will be your  for transportation? __Maria____         -Who will be staying with you for 24 hrs after your procedure?__________________  [x] Bring insurance card and photo ID. [x] Bring copy of living will or healthcare power of  papers to be placed in your electronic record. [] Urine Pregnancy test will be preformed the day of surgery. A specimen sample may be brought from home. [] Transfusion Bracelet: Please bring with you to hospital, day of surgery. PARKING INSTRUCTIONS:     [x] ARRIVAL DATE & TIME:  2/27  @  0585   [x] Enter into the [facility] [employer]. Two people may accompany you. Masks are not required but are recommended. [x] Parking Lot \"I\" is where you will park. It is located on the corner of [facility] [address] and [address]. The entrance is on [address].    Upon entering the parking lot, a voucher ticket will print    EDUCATION INSTRUCTIONS:        [] Knee or Hip replacement booklet & exercise pamphlets given. [] [address] placed in chart. [] Pre-admission Testing educational folder given  [] Incentive Spirometry,coughing & deep breathing exercises reviewed. [] Medication information sheet(s)   [] Fluoroscopy-Xray used in surgery reviewed with patient. Educational pamphlet placed in chart. [] Pain: Post-op pain is normal and to be expected. You will be asked to rate your pain from 0-10. [] Joint camp offered. [] Joint replacement booklets given.  [] Spine Navigator to see in PAT. [] Other:___________________________    MEDICATION INSTRUCTIONS:    [x] Bring a complete list of your medications, please write the last time you took the medicine, give this list to the nurse in Pre-Op. [x] Take only the following medications the morning of surgery with 1-2 ounces of water: carbatrol  [x] Stop all herbal supplements and vitamins 5 days before surgery. Stop NSAIDS 7 days before surgery. [] DO NOT take any diabetic medicine the morning of surgery. Follow instructions for insulin the day before surgery. [] If you are diabetic and your blood sugar is low or you feel symptomatic, you may drink 1-2 ounces of apple juice or take a glucose tablet.            -The morning of your procedure, you may call the pre-op area if you have concerns about your blood sugar [phone]. [] Use your inhalers the morning of surgery. Bring your emergency inhaler with you day of surgery. [x] Follow physician instructions regarding any blood thinners you may be taking. WHAT TO EXPECT:    [x] The day of surgery you will be greeted and checked in by the [employer].  In addition, you will be registered in the [facility] by a Patient Access Representative. Please bring your photo ID and insurance card.  A nurse will greet you in accordance to the time you are needed in the pre-op area to prepare you for surgery. Please do not be discouraged if you are not greeted in the order you arrive as there are many variables that are involved in patient preparation. Your patience is greatly appreciated as you wait for your nurse. Please bring in items such as: books, magazines, newspapers, electronics, or any other items  to occupy your time in the waiting area. [x]  Delays may occur with surgery and staff will make a sincere effort to keep you informed of delays. If any delays occur with your procedure, we apologize ahead of time for your inconvenience as we recognize the value of your time.

## 2023-02-24 ENCOUNTER — ANESTHESIA EVENT (OUTPATIENT)
Dept: OPERATING ROOM | Age: 63
End: 2023-02-24
Payer: OTHER GOVERNMENT

## 2023-02-27 ENCOUNTER — ANESTHESIA (OUTPATIENT)
Dept: OPERATING ROOM | Age: 63
End: 2023-02-27
Payer: OTHER GOVERNMENT

## 2023-02-27 ENCOUNTER — HOSPITAL ENCOUNTER (OUTPATIENT)
Age: 63
Setting detail: OUTPATIENT SURGERY
Discharge: HOME | End: 2023-02-27
Attending: OTOLARYNGOLOGY | Admitting: OTOLARYNGOLOGY
Payer: OTHER GOVERNMENT

## 2023-02-27 VITALS
OXYGEN SATURATION: 95 % | RESPIRATION RATE: 16 BRPM | BODY MASS INDEX: 25.46 KG/M2 | SYSTOLIC BLOOD PRESSURE: 124 MMHG | WEIGHT: 168 LBS | HEART RATE: 65 BPM | TEMPERATURE: 99 F | DIASTOLIC BLOOD PRESSURE: 57 MMHG | HEIGHT: 68 IN

## 2023-02-27 DIAGNOSIS — E04.1 THYROID NODULE: ICD-10-CM

## 2023-02-27 DIAGNOSIS — C73 THYROID CANCER (HCC): ICD-10-CM

## 2023-02-27 DIAGNOSIS — Z01.812 PRE-OPERATIVE LABORATORY EXAMINATION: Primary | ICD-10-CM

## 2023-02-27 LAB
HCT VFR BLD CALC: 39.8 % (ref 37–54)
HEMOGLOBIN: 14 G/DL (ref 12.5–16.5)
MCH RBC QN AUTO: 32.5 PG (ref 26–35)
MCHC RBC AUTO-ENTMCNC: 35.2 % (ref 32–34.5)
MCV RBC AUTO: 92.3 FL (ref 80–99.9)
PARATHYROID HORMONE INTACT: 31 PG/ML (ref 15–65)
PDW BLD-RTO: 11.8 FL (ref 11.5–15)
PLATELET # BLD: 210 E9/L (ref 130–450)
PMV BLD AUTO: 10 FL (ref 7–12)
RBC # BLD: 4.31 E12/L (ref 3.8–5.8)
WBC # BLD: 7.9 E9/L (ref 4.5–11.5)

## 2023-02-27 PROCEDURE — 7100000000 HC PACU RECOVERY - FIRST 15 MIN: Performed by: OTOLARYNGOLOGY

## 2023-02-27 PROCEDURE — 2709999900 HC NON-CHARGEABLE SUPPLY: Performed by: OTOLARYNGOLOGY

## 2023-02-27 PROCEDURE — 36415 COLL VENOUS BLD VENIPUNCTURE: CPT

## 2023-02-27 PROCEDURE — 7100000001 HC PACU RECOVERY - ADDTL 15 MIN: Performed by: OTOLARYNGOLOGY

## 2023-02-27 PROCEDURE — 2500000003 HC RX 250 WO HCPCS

## 2023-02-27 PROCEDURE — 7100000011 HC PHASE II RECOVERY - ADDTL 15 MIN: Performed by: OTOLARYNGOLOGY

## 2023-02-27 PROCEDURE — 7100000010 HC PHASE II RECOVERY - FIRST 15 MIN: Performed by: OTOLARYNGOLOGY

## 2023-02-27 PROCEDURE — 2580000003 HC RX 258: Performed by: OTOLARYNGOLOGY

## 2023-02-27 PROCEDURE — 6360000002 HC RX W HCPCS: Performed by: OTOLARYNGOLOGY

## 2023-02-27 PROCEDURE — 3600000004 HC SURGERY LEVEL 4 BASE: Performed by: OTOLARYNGOLOGY

## 2023-02-27 PROCEDURE — 2720000010 HC SURG SUPPLY STERILE: Performed by: OTOLARYNGOLOGY

## 2023-02-27 PROCEDURE — 3700000000 HC ANESTHESIA ATTENDED CARE: Performed by: OTOLARYNGOLOGY

## 2023-02-27 PROCEDURE — 88307 TISSUE EXAM BY PATHOLOGIST: CPT

## 2023-02-27 PROCEDURE — 83970 ASSAY OF PARATHORMONE: CPT

## 2023-02-27 PROCEDURE — 2580000003 HC RX 258

## 2023-02-27 PROCEDURE — 6360000002 HC RX W HCPCS

## 2023-02-27 PROCEDURE — 3600000014 HC SURGERY LEVEL 4 ADDTL 15MIN: Performed by: OTOLARYNGOLOGY

## 2023-02-27 PROCEDURE — 3700000001 HC ADD 15 MINUTES (ANESTHESIA): Performed by: OTOLARYNGOLOGY

## 2023-02-27 PROCEDURE — 88305 TISSUE EXAM BY PATHOLOGIST: CPT

## 2023-02-27 PROCEDURE — 6360000002 HC RX W HCPCS: Performed by: ANESTHESIOLOGY

## 2023-02-27 PROCEDURE — 2500000003 HC RX 250 WO HCPCS: Performed by: OTOLARYNGOLOGY

## 2023-02-27 PROCEDURE — 85027 COMPLETE CBC AUTOMATED: CPT

## 2023-02-27 RX ORDER — SODIUM CHLORIDE 9 MG/ML
INJECTION, SOLUTION INTRAVENOUS CONTINUOUS PRN
Status: DISCONTINUED | OUTPATIENT
Start: 2023-02-27 | End: 2023-02-27 | Stop reason: SDUPTHER

## 2023-02-27 RX ORDER — ONDANSETRON 4 MG/1
4 TABLET, FILM COATED ORAL 3 TIMES DAILY PRN
Qty: 15 TABLET | Refills: 0 | Status: SHIPPED | OUTPATIENT
Start: 2023-02-27

## 2023-02-27 RX ORDER — SUCCINYLCHOLINE/SOD CL,ISO/PF 200MG/10ML
SYRINGE (ML) INTRAVENOUS PRN
Status: DISCONTINUED | OUTPATIENT
Start: 2023-02-27 | End: 2023-02-27 | Stop reason: SDUPTHER

## 2023-02-27 RX ORDER — PROPOFOL 10 MG/ML
INJECTION, EMULSION INTRAVENOUS PRN
Status: DISCONTINUED | OUTPATIENT
Start: 2023-02-27 | End: 2023-02-27 | Stop reason: SDUPTHER

## 2023-02-27 RX ORDER — HYDROCODONE BITARTRATE AND ACETAMINOPHEN 5; 325 MG/1; MG/1
1 TABLET ORAL EVERY 4 HOURS PRN
Qty: 12 TABLET | Refills: 0 | Status: SHIPPED | OUTPATIENT
Start: 2023-02-27 | End: 2023-03-02

## 2023-02-27 RX ORDER — ONDANSETRON 2 MG/ML
4 INJECTION INTRAMUSCULAR; INTRAVENOUS
Status: DISCONTINUED | OUTPATIENT
Start: 2023-02-27 | End: 2023-02-27 | Stop reason: HOSPADM

## 2023-02-27 RX ORDER — SODIUM CHLORIDE 9 MG/ML
INJECTION, SOLUTION INTRAVENOUS PRN
Status: DISCONTINUED | OUTPATIENT
Start: 2023-02-27 | End: 2023-02-27 | Stop reason: HOSPADM

## 2023-02-27 RX ORDER — MEPERIDINE HYDROCHLORIDE 25 MG/ML
12.5 INJECTION INTRAMUSCULAR; INTRAVENOUS; SUBCUTANEOUS
Status: DISCONTINUED | OUTPATIENT
Start: 2023-02-27 | End: 2023-02-27 | Stop reason: HOSPADM

## 2023-02-27 RX ORDER — PHENYLEPHRINE HCL IN 0.9% NACL 1 MG/10 ML
SYRINGE (ML) INTRAVENOUS PRN
Status: DISCONTINUED | OUTPATIENT
Start: 2023-02-27 | End: 2023-02-27 | Stop reason: SDUPTHER

## 2023-02-27 RX ORDER — DEXAMETHASONE SODIUM PHOSPHATE 10 MG/ML
INJECTION INTRAMUSCULAR; INTRAVENOUS PRN
Status: DISCONTINUED | OUTPATIENT
Start: 2023-02-27 | End: 2023-02-27 | Stop reason: SDUPTHER

## 2023-02-27 RX ORDER — LIDOCAINE HYDROCHLORIDE AND EPINEPHRINE 10; 10 MG/ML; UG/ML
INJECTION, SOLUTION INFILTRATION; PERINEURAL PRN
Status: DISCONTINUED | OUTPATIENT
Start: 2023-02-27 | End: 2023-02-27 | Stop reason: ALTCHOICE

## 2023-02-27 RX ORDER — SODIUM CHLORIDE 0.9 % (FLUSH) 0.9 %
5-40 SYRINGE (ML) INJECTION EVERY 12 HOURS SCHEDULED
Status: DISCONTINUED | OUTPATIENT
Start: 2023-02-27 | End: 2023-02-27 | Stop reason: HOSPADM

## 2023-02-27 RX ORDER — ONDANSETRON 2 MG/ML
INJECTION INTRAMUSCULAR; INTRAVENOUS PRN
Status: DISCONTINUED | OUTPATIENT
Start: 2023-02-27 | End: 2023-02-27 | Stop reason: SDUPTHER

## 2023-02-27 RX ORDER — FENTANYL CITRATE 50 UG/ML
INJECTION, SOLUTION INTRAMUSCULAR; INTRAVENOUS PRN
Status: DISCONTINUED | OUTPATIENT
Start: 2023-02-27 | End: 2023-02-27 | Stop reason: SDUPTHER

## 2023-02-27 RX ORDER — LIDOCAINE HYDROCHLORIDE 20 MG/ML
INJECTION, SOLUTION INTRAVENOUS PRN
Status: DISCONTINUED | OUTPATIENT
Start: 2023-02-27 | End: 2023-02-27 | Stop reason: SDUPTHER

## 2023-02-27 RX ORDER — SODIUM CHLORIDE 0.9 % (FLUSH) 0.9 %
5-40 SYRINGE (ML) INJECTION PRN
Status: DISCONTINUED | OUTPATIENT
Start: 2023-02-27 | End: 2023-02-27 | Stop reason: HOSPADM

## 2023-02-27 RX ORDER — MIDAZOLAM HYDROCHLORIDE 1 MG/ML
INJECTION INTRAMUSCULAR; INTRAVENOUS PRN
Status: DISCONTINUED | OUTPATIENT
Start: 2023-02-27 | End: 2023-02-27 | Stop reason: SDUPTHER

## 2023-02-27 RX ADMIN — FENTANYL CITRATE 50 MCG: 50 INJECTION, SOLUTION INTRAMUSCULAR; INTRAVENOUS at 07:28

## 2023-02-27 RX ADMIN — Medication 140 MG: at 07:28

## 2023-02-27 RX ADMIN — PROPOFOL 200 MG: 10 INJECTION, EMULSION INTRAVENOUS at 07:28

## 2023-02-27 RX ADMIN — MIDAZOLAM 2 MG: 1 INJECTION INTRAMUSCULAR; INTRAVENOUS at 07:19

## 2023-02-27 RX ADMIN — PHENYLEPHRINE HYDROCHLORIDE 30 MCG/MIN: 10 INJECTION INTRAVENOUS at 07:54

## 2023-02-27 RX ADMIN — CEFAZOLIN 2000 MG: 2 INJECTION, POWDER, FOR SOLUTION INTRAMUSCULAR; INTRAVENOUS at 07:34

## 2023-02-27 RX ADMIN — Medication 100 MCG: at 07:49

## 2023-02-27 RX ADMIN — SODIUM CHLORIDE: 9 INJECTION, SOLUTION INTRAVENOUS at 06:28

## 2023-02-27 RX ADMIN — SODIUM CHLORIDE: 9 INJECTION, SOLUTION INTRAVENOUS at 07:19

## 2023-02-27 RX ADMIN — Medication 100 MCG: at 07:53

## 2023-02-27 RX ADMIN — FENTANYL CITRATE 50 MCG: 50 INJECTION, SOLUTION INTRAMUSCULAR; INTRAVENOUS at 07:42

## 2023-02-27 RX ADMIN — FENTANYL CITRATE 50 MCG: 50 INJECTION, SOLUTION INTRAMUSCULAR; INTRAVENOUS at 07:24

## 2023-02-27 RX ADMIN — HYDROMORPHONE HYDROCHLORIDE 0.5 MG: 1 INJECTION, SOLUTION INTRAMUSCULAR; INTRAVENOUS; SUBCUTANEOUS at 11:03

## 2023-02-27 RX ADMIN — LIDOCAINE HYDROCHLORIDE 100 MG: 20 INJECTION, SOLUTION INTRAVENOUS at 07:28

## 2023-02-27 RX ADMIN — ONDANSETRON 4 MG: 2 INJECTION INTRAMUSCULAR; INTRAVENOUS at 08:44

## 2023-02-27 RX ADMIN — FENTANYL CITRATE 50 MCG: 50 INJECTION, SOLUTION INTRAMUSCULAR; INTRAVENOUS at 08:04

## 2023-02-27 RX ADMIN — DEXAMETHASONE SODIUM PHOSPHATE 10 MG: 10 INJECTION INTRAMUSCULAR; INTRAVENOUS at 07:41

## 2023-02-27 ASSESSMENT — PAIN DESCRIPTION - LOCATION
LOCATION: NECK
LOCATION: THROAT

## 2023-02-27 ASSESSMENT — PAIN DESCRIPTION - PAIN TYPE: TYPE: SURGICAL PAIN

## 2023-02-27 ASSESSMENT — PAIN DESCRIPTION - ONSET: ONSET: ON-GOING

## 2023-02-27 ASSESSMENT — PAIN DESCRIPTION - FREQUENCY: FREQUENCY: CONTINUOUS

## 2023-02-27 ASSESSMENT — LIFESTYLE VARIABLES: SMOKING_STATUS: 0

## 2023-02-27 ASSESSMENT — PAIN DESCRIPTION - ORIENTATION: ORIENTATION: ANTERIOR

## 2023-02-27 ASSESSMENT — PAIN DESCRIPTION - DESCRIPTORS: DESCRIPTORS: DISCOMFORT

## 2023-02-27 ASSESSMENT — PAIN SCALES - GENERAL: PAINLEVEL_OUTOF10: 3

## 2023-02-27 ASSESSMENT — PAIN - FUNCTIONAL ASSESSMENT: PAIN_FUNCTIONAL_ASSESSMENT: NONE - DENIES PAIN

## 2023-02-27 NOTE — ANESTHESIA POSTPROCEDURE EVALUATION
Department of Anesthesiology  Postprocedure Note    Patient: Galen Wood  MRN: [id number]  YOB: 1960  Date of evaluation: 2/27/2023      Procedure Summary     Date: 02/27/23 Room / Location: [facility] OR 04 / [facility]    Anesthesia Start: 9392 Anesthesia Stop: 6666    Procedure: THYROIDECTOMY COMPLETION RIGHT SIDE (Right: Neck) Diagnosis:       Thyroid nodule      (Thyroid nodule [E04.1])    Surgeons: Frank Machuca MD Responsible Provider: Shannan Delgado DO    Anesthesia Type: general ASA Status: 3          Anesthesia Type: No value filed.     Loly Phase I: Loly Score: 8    Loly Phase II:        Anesthesia Post Evaluation    Patient location during evaluation: PACU  Patient participation: complete - patient participated  Level of consciousness: awake and alert  Airway patency: patent  Nausea & Vomiting: no nausea and no vomiting  Complications: no  Cardiovascular status: blood pressure returned to baseline  Respiratory status: acceptable  Hydration status: euvolemic  Multimodal analgesia pain management approach

## 2023-02-27 NOTE — H&P
Dear Dr Sung Garcia MD No ref. provider found      We had the pleasure of seeing Leah Moss, 1960 here    on 12/13/2022  Please see below for review of care and plans. 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 8 x 10 x 7 mm nodule was found on PET scan as patient with history of lymphoma. No B systems and is not requiring treatment at this time for the lymphoma. He reports raspy voice occasionally. No smoking. Social drinking. Pt does have history of trigeminal neuralgia. TSH 2.660     9/20/22: Pt here for p/o hiram thyroidectomy. Lt side internal neck pain, tightness in neck. Very little difficulty swallowing, sore throat. Unable to become high pitched/become loud. Drinks 30oz of water, 2 cups of coffee, and no alcohol. Weight is stable. 12/13/22: Pt here for 3 mo hiram thyroidectomy. Discomfort in lower neck when looking upwards, and a slight pinching sensation in lower lefty neck periodically pain. No difficulty swallowing. States has an issue pronouncing certain words and notices a gravel to voice. Drinks 30 oz of water, 2-3 cups of coffee, and occasional alcohol. Weight is stable. Review of Systems- No drainage, discharge, or headache. Complete 10 system ROS completed and negative except as noted above. Family hx : noncontributary  Social hx: non smoker  Meds: per chart  Allergies: cats    Physical Examination-   Vital Signs-Ht 5' 8\" (1.727 m)   Wt 171 lb 11.2 oz (77.9 kg)   BMI 26.11 kg/m²     Ears- Tympanic membranes clear bilaterally. No middle ear effusion. No pre or post auricular tenderness. Nose- Nasal mucosa clear and dry. No significant septal deviation or inferior turbinate hypertrophy. Oral Cavity/Oropharynx- Floor of mouth and tongue are soft and nontender. No posterior pharyngeal erythema. + gag reflex  Neck- Soft and nontender.   No masses, lesions, lymphadenopathy, or thyroid nodules appreciated. Well healed surgical incision   Cranial Nerve- Cranial nerves II to XII intact. Extraocular muscles intact. No gross motor visual deficits. No spontaneous nystagmus. Face- No facial skin tenderness to palpation. Heart- No cyanosis, regular  Lungs- No stridor, no intercostal accessory muscle use  General- The patient is in no acute distress. A&O x3        Scope - 12/13/22  Procedure note- after pt verbally consented, was sprayed nasally with 1:1 neosynephrine and xylocaine. Scope was passed and found nasal cavity with no lesion. np clear, tonsil wnl, tongue mobile and no masses, vocal cords mobile bilaterally with full ab and ad duction. Hypopharynx clear, open and no masses. Left sided TVF nodule with 1-2mm glottic gap- stable                                TSH   Date Value Ref Range Status   11/21/2022 4.530 (H) 0.270 - 4.200 uIU/mL Final   08/29/2022 2.930 0.270 - 4.200 uIU/mL Final            Calcium   Date Value Ref Range Status   11/21/2022 9.3 8.6 - 10.2 mg/dL Final   08/29/2022 9.3 8.6 - 10.2 mg/dL Final        Medical Decision Making and Treatment Plan. 1. Patient seen and examined with papillary thyroid cancer of the left lobe. Subcentimeter nodule.-done  2. Discussed left hiram vs total thyroidectomy as nodule less than 1cm. - left hemithyroidectomy done 9/12/22 well healed  3. Pathology discussed of 1.3cm left papillary thyroid, + superior margin, no extrathyroidal extension. Discussed with patient recommendations of completion thyroidectomy, had extensive discussion of observation as this is patients preference vs completion thyroid. He would like to observe at this time and discuss with his other oncology team at his next appt in 11/2022. We will also discuss his case at tumor board. 4. Follow up in 12/2022 after appt with Dr Bruna Jacome in 11/21/22. - done- she also recommended surgery.   5. Has other appointments he would like to get done first and then fu back with us regarding if he wishes surgery. All of his and his wifes questions were answered.

## 2023-02-27 NOTE — H&P
H&P reviewed, no changes. No issues. Questions and concerns were answered to the patient's satisfaction.  Will proceed with procedure    Will discuss with attending     Electronically signed by Kimberlee Veras DO on 2/27/23 at 6:25 AM EST

## 2023-02-27 NOTE — ANESTHESIA PRE PROCEDURE
Department of Anesthesiology  Preprocedure Note       Name:  Vanesa Velez   Age:  59 y.o.  :  1960                                          MRN:  [id number]         Date:  2023      Surgeon: Ivan Elkins):  Shetlon Gomez MD    Procedure: Procedure(s):  THYROIDECTOMY COMPLETION RIGHT SIDE, NERVE INTEGRITY MONITOR    Medications prior to admission:   Prior to Admission medications    Medication Sig Start Date End Date Taking?  Authorizing Provider   levothyroxine (SYNTHROID) 75 MCG tablet Take 1 tablet by mouth daily 23   Kat Degroot MD   tadalafil (CIALIS) 5 MG tablet take 1 tablet by mouth once daily 22   Historical Provider, MD   vitamin C (ASCORBIC ACID) 500 MG tablet Take 500 mg by mouth 2 times daily    Historical Provider, MD   magnesium 30 MG tablet Take 25 mg by mouth daily    Historical Provider, MD   EMGALITY 120 MG/ML SOAJ inject every MONTH subcutaneously as directed 22   Historical Provider, MD   vitamin E 1000 units capsule Take 1,000 Units by mouth daily    Historical Provider, MD   vitamin E 400 UNIT capsule Take 400 Units by mouth daily    Historical Provider, MD   Zinc 25 MG TABS Take 1 tablet by mouth daily    Historical Provider, MD   Multiple Vitamins-Minerals (CENTRUM SILVER 50+MEN) TABS Take by mouth    Historical Provider, MD   rosuvastatin (CRESTOR) 5 MG tablet take 1 tablet by mouth once daily 21   Historical Provider, MD   LORazepam (ATIVAN) 0.5 MG tablet lorazepam 0.5 mg tablet    Historical Provider, MD   AIMOVIG 140 MG/ML SOAJ  21   Historical Provider, MD   Rimegepant Sulfate (NURTEC) 75 MG TBDP Take 75 mg by mouth daily as needed     Historical Provider, MD   carBAMazepine (CARBATROL) 300 MG extended release capsule Take 300 mg by mouth 2 times daily Morning and afternoon    Historical Provider, MD   carBAMazepine (CARBATROL) 300 MG extended release capsule Take 600 mg by mouth nightly    Historical Provider, MD   levETIRAcetam (KEPPRA) 500 MG tablet Take 500 mg by mouth 2 times daily Midday and evening    Historical Provider, MD   Lacosamide (VIMPAT PO) Take 100 mg by mouth 2 times daily Midday and bedtime    Historical Provider, MD       Current medications:    No current facility-administered medications for this visit. No current outpatient medications on file. Facility-Administered Medications Ordered in Other Visits   Medication Dose Route Frequency Provider Last Rate Last Admin    sodium chloride flush 0.9 % injection 5-40 mL  5-40 mL IntraVENous 2 times per day Welford Jackson, DO        sodium chloride flush 0.9 % injection 5-40 mL  5-40 mL IntraVENous PRN Ame Farooq, DO        0.9 % sodium chloride infusion   IntraVENous PRN Welford Jackson, DO 10 mL/hr at 02/27/23 0628 New Bag at 02/27/23 0628    ceFAZolin (ANCEF) 2,000 mg in sterile water 20 mL IV syringe  2,000 mg IntraVENous On Call to Joselin 380, DO           Allergies: Allergies   Allergen Reactions    Cat Hair Extract Other (See Comments)     congestion       Problem List:    Patient Active Problem List   Diagnosis Code    Thyroid cancer ([facility] C73    Thyroid nodule E04.1       Past Medical History:        Diagnosis Date    Cancer ([facility]     Headache     Hyperlipidemia     Shingles 07/2021    left arm    Trigeminal neuralgia of left side of face 2009       Past Surgical History:        Procedure Laterality Date    LYMPH NODE BIOPSY Left 12/13/2021    EXCISION LEFT AXILLARY LYMPH NODE  **LYMPHOMA PROTOCOL** performed by Rhianna Hill MD at [address] [place] [address] Left 9/12/2022    LEFT COLLINS THYROIDECTOMY performed by Yared León MD at [place] [address] TMJ ARTHROSCOPY      VASCULAR SURGERY Left 11/2019    multivascular decompression of trigeminal nerve       Social History:    Social History     Tobacco Use    Smoking status: Never    Smokeless tobacco: Never   Substance Use Topics    Alcohol use:  Yes     Alcohol/week: 1.0 standard drink     Types: 1 Glasses of wine per week     Comment: occasionally                                Counseling given: Not Answered      Vital Signs (Current): There were no vitals filed for this visit. BP Readings from Last 3 Encounters:   02/27/23 (!) 146/83   02/16/23 132/82   01/23/23 (!) 143/82       NPO Status:  >8H                                                                               BMI:   Wt Readings from Last 3 Encounters:   02/27/23 168 lb (76.2 kg)   02/16/23 168 lb 14.4 oz (76.6 kg)   01/23/23 172 lb (78 kg)     There is no height or weight on file to calculate BMI.    CBC:   Lab Results   Component Value Date/Time    WBC 7.9 02/27/2023 06:27 AM    RBC 4.31 02/27/2023 06:27 AM    HGB 14.0 02/27/2023 06:27 AM    HCT 39.8 02/27/2023 06:27 AM    MCV 92.3 02/27/2023 06:27 AM    RDW 11.8 02/27/2023 06:27 AM     02/27/2023 06:27 AM       CMP:   Lab Results   Component Value Date/Time     02/16/2023 02:50 PM    K 4.3 02/16/2023 02:50 PM    CL 97 02/16/2023 02:50 PM    CO2 28 02/16/2023 02:50 PM    BUN 17 02/16/2023 02:50 PM    CREATININE 0.8 02/16/2023 02:50 PM    GFRAA >60 08/29/2022 01:18 PM    LABGLOM >60 02/16/2023 02:50 PM    GLUCOSE 92 02/16/2023 02:50 PM    GLUCOSE 82 11/18/2011 11:09 AM    PROT 6.9 02/16/2023 02:50 PM    CALCIUM 9.5 02/16/2023 02:50 PM    BILITOT <0.2 02/16/2023 02:50 PM    ALKPHOS 61 02/16/2023 02:50 PM    AST 20 02/16/2023 02:50 PM    ALT 15 02/16/2023 02:50 PM       POC Tests: No results for input(s): POCGLU, POCNA, POCK, POCCL, POCBUN, POCHEMO, POCHCT in the last 72 hours.     Coags: No results found for: PROTIME, INR, APTT    HCG (If Applicable): No results found for: PREGTESTUR, PREGSERUM, HCG, HCGQUANT     ABGs: No results found for: PHART, PO2ART, OLS6GTB, DTD8UIC, BEART, O9OZDODW     Type & Screen (If Applicable):  No results found for: LABABO, LABRH    Drug/Infectious Status (If Applicable):  No results found for: HIV, HEPCAB    COVID-19 Screening (If Applicable): No results found for: COVID19        Anesthesia Evaluation  Patient summary reviewed and Nursing notes reviewed no history of anesthetic complications:   Airway: Mallampati: II  TM distance: >3 FB   Neck ROM: full  Mouth opening: > = 3 FB   Dental:    (+) caps      Pulmonary:normal exam  breath sounds clear to auscultation  (+) asthma (Allergic to cats: denies recent exacerbations or inhaler usage): allergic asthma,     (-) not a current smoker                           Cardiovascular:Negative CV ROS  Exercise tolerance: good (>4 METS),   (+) hyperlipidemia    Murmur: Grade I.    ECG reviewed  Rhythm: regular  Rate: normal  Echocardiogram reviewed         Beta Blocker:  Not on Beta Blocker      ROS comment: Sinus bradycardia  Otherwise normal ECG  No previous ECGs available    2D Echocardiogram 01/18/2022  Summary   Ejection fraction is visually estimated at 68%. Overall ejection fraction is normal .   Trace mitral regurgitation is present. Trace to mild tricuspid regurgitation. Signature      ----------------------------------------------------------------   Electronically signed by Zahra Palomo MD(Interpreting   physician) on 01/18/2022 07:09 PM     Neuro/Psych:   (+) neuromuscular disease (Trigeminal neuralgia left face):, headaches:, depression/anxiety              ROS comment: migraines GI/Hepatic/Renal: Neg GI/Hepatic/Renal ROS            Endo/Other:    (+) malignancy/cancer. Pt had no PAT visit        ROS comment: Lymphoma. No treatment  Thyroid carcinoma, L thyroidectomy Sept 2022 Abdominal:         (-) obese       Vascular: negative vascular ROS. Other Findings:             Anesthesia Plan      general     ASA 3       Induction: intravenous. MIPS: Postoperative opioids intended, Prophylactic antiemetics administered and Postoperative trial extubation. Anesthetic plan and risks discussed with patient.     Use of blood products discussed with patient whom consented to blood products. Plan discussed with CRNA and attending.                     Leti Ziegler RN [facility]  2/27/2023

## 2023-02-27 NOTE — PROGRESS NOTES
Message sent to Dr. Montserrat Perry regarding patient request. \"Patient is currently in secondary space 13. Patient was sent norco to [facility] pharmacy. Patient is wondering if this medication can be changed to a liquid form as it is difficult for him to swallow. \"  Electronically signed by Lyle Gibbons RN on 2/27/2023 at 11:32 AM      Dr. Hua Singh stated patient can crush this medication and take it in applesauce.   Electronically signed by Lyle Gibbons RN on 2/27/2023 at 11:51 AM

## 2023-02-27 NOTE — DISCHARGE INSTRUCTIONS
ENT Post-Op Instructions    Follow up with Dr. Ilia Ace  in 1 week(s). CALL OFFICE TO SCHEDULE/CONFIRM APPOINTMENT    Please follow the instructions below:    DIET INSTRUCTIONS:  Regular diet. Start with light meals today and increase as tolerated. ACTIVITY INSTRUCTIONS:  Increase activity as tolerated    Elevate Head of bed   No heavy lifting or strenuous activity until your cleared at your post-operative appointment   No driving while taking pain medication    WOUND/DRESSING INSTRUCTIONS:  May shower normally in 24 hours from time of surgery. May shower from the neck down tonight. Ice to areas of pain. You have sutures that dissolve and do not need to be removed. You have steri-trip bandages (white bandages) over your incision. Leave these in place. Do not remove them. They will fall off in about 1 week. They will curl and peel at the edges, this is normal. They are OK to get wet, but do not soak. Blot dry, do not scrub. Once the steri strips have fallen off or been removed in the office, place antibiotic ointment on the incision twice a day for 1-2 weeks. OK to use scar cream after 2 weeks. Use sunscreen when going out in the sun for the first 6 months. Be careful to not extend your head backwards for a few weeks, this puts tension on the incision line and can cause more scarring. MEDICATION INSTRUCTIONS:  Take medication as prescribed. When taking pain medications, you may experience dizziness or drowsiness. Do not drink alcohol or drive when taking these medications. You may take Ibuprofen (over the counter) as per directions for mild pain. Do not take any other acetaminophen (Tylenol) products while taking your pain medication. You may experience constipation while taking narcotic pain medication - You may take over the counter stool softeners: docuscate (Colace) or sennosides S (Senokot - S).    Take Calcium (TUMS) 1,500mg if you experience numbness or tingling of the fingers or lips. Call the office if this does not resolve.     Call physician for any of the following or for questions/concerns:   Fever over 101° F    Redness, swelling, hardness or warmth at the wound site (s)  Unrelieved nausea/vomiting    Foul smelling or cloudy drainage at the wound site (s)   Unrelieved pain or increase in pain     Increase in shortness of breath

## 2023-02-27 NOTE — PROGRESS NOTES
Discharge instructions reviewed with patient and patient's wife. Understanding stated of instructions.   Electronically signed by Edward Vazquez RN on 2/27/2023 at 12:06 PM

## 2023-03-01 ENCOUNTER — TELEPHONE (OUTPATIENT)
Dept: ENT CLINIC | Age: 63
End: 2023-03-01

## 2023-03-01 NOTE — TELEPHONE ENCOUNTER
Pt's wife called in regarding the post op appt for 3/2/23, the pt tested positive for covid. [employer] can be reached at  [phone].

## 2023-03-09 ENCOUNTER — OFFICE VISIT (OUTPATIENT)
Dept: OTOLARYNGOLOGY | Age: 63
End: 2023-03-09

## 2023-03-09 ENCOUNTER — HOSPITAL ENCOUNTER (OUTPATIENT)
Age: 63
Discharge: HOME | End: 2023-03-09
Payer: OTHER GOVERNMENT

## 2023-03-09 VITALS — WEIGHT: 162 LBS | BODY MASS INDEX: 24.63 KG/M2

## 2023-03-09 DIAGNOSIS — C73 THYROID CANCER (HCC): Primary | ICD-10-CM

## 2023-03-09 DIAGNOSIS — C73 THYROID CANCER (HCC): ICD-10-CM

## 2023-03-09 LAB
T4 FREE: 0.88 NG/DL (ref 0.93–1.7)
TSH SERPL DL<=0.05 MIU/L-ACNC: 9.09 UIU/ML (ref 0.27–4.2)

## 2023-03-09 PROCEDURE — 99024 POSTOP FOLLOW-UP VISIT: CPT | Performed by: OTOLARYNGOLOGY

## 2023-03-09 PROCEDURE — 84443 ASSAY THYROID STIM HORMONE: CPT

## 2023-03-09 PROCEDURE — 36415 COLL VENOUS BLD VENIPUNCTURE: CPT

## 2023-03-09 PROCEDURE — 86376 MICROSOMAL ANTIBODY EACH: CPT

## 2023-03-09 PROCEDURE — 84432 ASSAY OF THYROGLOBULIN: CPT

## 2023-03-09 PROCEDURE — 86800 THYROGLOBULIN ANTIBODY: CPT

## 2023-03-09 PROCEDURE — 84439 ASSAY OF FREE THYROXINE: CPT

## 2023-03-09 RX ORDER — LAMOTRIGINE 25 MG/1
TABLET ORAL
COMMUNITY
Start: 2023-02-24

## 2023-03-10 LAB
THYROGLOBULIN AB: <0.9 IU/ML (ref 0–4)
THYROGLOBULIN BY LC-MS/MS, SERUM/PLASMA: ABNORMAL NG/ML (ref 1.3–31.8)
THYROGLOBULIN, SERUM OR PLASMA: 0.3 NG/ML (ref 1.3–31.8)

## 2023-03-21 ENCOUNTER — OFFICE VISIT (OUTPATIENT)
Dept: ENDOCRINOLOGY | Age: 63
End: 2023-03-21
Payer: OTHER GOVERNMENT

## 2023-03-21 VITALS
DIASTOLIC BLOOD PRESSURE: 82 MMHG | BODY MASS INDEX: 25.61 KG/M2 | RESPIRATION RATE: 18 BRPM | OXYGEN SATURATION: 99 % | WEIGHT: 169 LBS | SYSTOLIC BLOOD PRESSURE: 131 MMHG | HEIGHT: 68 IN | HEART RATE: 53 BPM

## 2023-03-21 DIAGNOSIS — C73 THYROID CANCER (HCC): ICD-10-CM

## 2023-03-21 DIAGNOSIS — E04.1 THYROID NODULE: ICD-10-CM

## 2023-03-21 DIAGNOSIS — E89.0 POSTOPERATIVE HYPOTHYROIDISM: Primary | ICD-10-CM

## 2023-03-21 PROCEDURE — 99214 OFFICE O/P EST MOD 30 MIN: CPT | Performed by: INTERNAL MEDICINE

## 2023-03-21 RX ORDER — LEVOTHYROXINE SODIUM 0.1 MG/1
100 TABLET ORAL DAILY
Qty: 90 TABLET | Refills: 3 | Status: SHIPPED | OUTPATIENT
Start: 2023-03-21

## 2023-03-21 RX ORDER — AMOXICILLIN 875 MG/1
TABLET, COATED ORAL
COMMUNITY
Start: 2023-03-17

## 2023-03-21 NOTE — PROGRESS NOTES
Keep an eye on Mehria's symptoms. Your X-ray was normal. There was no signs of infection. Watch for redness, fevers, localized pain, or any other new symptoms. Please return if you have any further concerns. Follow up with your doctor.   AM    CHOL 178 04/30/2021 08:41 AM     No results found for: [address], a 59 y.o.-old male seen in for the following issues   Assessment:      Diagnosis Orders   1. Postoperative hypothyroidism        2. Thyroid cancer ([address]        3. Thyroid nodule            Plan:     1. Papillary thyroid cancer   Pt s/p Lt thyroid lobectomy and isthmusectomy 9/12/2022  Pathology report --> 1.3 x 1 x 0.7 cm  PTC, Margin status: Margin involved by carcinoma  Pt going for completion thyroidectomy in few weeks   Will order [place] ablation      2. Postoperative Hypothyroidism   On levothyroxine 100 mcg daily      2. Follicular lymphoma  Following with hematology/oncology    I personally reviewed external notes from PCP and other patient's care team providers, and personally interpreted labs associated with the above diagnosis. I also ordered labs to further assess and manage the above addressed medical conditions    Return in about 3 months (around 6/21/2023) for hypothyroidism. The above issues were reviewed with the patient who understood and agreed with the plan. Thank you for allowing us to participate in the care of this patient. Please do not hesitate to contact us with any additional questions. Diagnosis Orders   1. Postoperative hypothyroidism  levothyroxine (SYNTHROID) 100 MCG tablet      2. Thyroid cancer (HCC)  TSH    T4, Free    IODINE, URINE    NM RADIOPHARM THERAPY ORAL    NM THYROID METASTASES SCAN WHOLE BODY    levothyroxine (SYNTHROID) 100 MCG tablet      3. Thyroid nodule          Shakir Galeano MD  [facility]   [address] [facility] [address]   Phone: [phone]  Fax: [phone]  --------------------------------------------  An electronic signature was used to authenticate this note.  Ruth Alejandro MD on 3/21/2023 at 10:02 AM

## 2023-03-29 PROBLEM — Z01.812 PRE-OPERATIVE LABORATORY EXAMINATION: Status: RESOLVED | Noted: 2023-02-27 | Resolved: 2023-03-29

## 2023-04-03 NOTE — PROGRESS NOTES
2/27/23 - THYROIDECTOMY COMPLETION RIGHT SIDE    9/12/22 - LEFT COLLINS THYROIDECTOMY    3/9/23: Pt here for post op completion of Right thyroidectomy. Tenderness when tilting head back and stiffness in neck when looking left and right. Some difficulty swallowing due to stiffness, just notices it feels different to swallow. No change in voice. Drinks 30 oz of water, 2-3 cups of coffee, and occasional alcohol. Weight loss of 6 lbs since LOV 2/27/23.
BMI 24.63 kg/m²     Ears- Tympanic membranes clear bilaterally. No middle ear effusion. No pre or post auricular tenderness. Nose- Nasal mucosa clear and dry. No significant septal deviation or inferior turbinate hypertrophy. Oral Cavity/Oropharynx- Floor of mouth and tongue are soft and nontender. No posterior pharyngeal erythema. + gag reflex  Neck- Soft and nontender. No masses, lesions, lymphadenopathy, or thyroid nodules appreciated. Well healed surgical incision   Cranial Nerve- Cranial nerves II to XII intact. Extraocular muscles intact. No gross motor visual deficits. No spontaneous nystagmus. Face- No facial skin tenderness to palpation. Heart- No cyanosis, regular  Lungs- No stridor, no intercostal accessory muscle use  General- The patient is in no acute distress. A&O x3      Scope - 12/13/22  Procedure note- after pt verbally consented, was sprayed nasally with 1:1 neosynephrine and xylocaine. Scope was passed and found nasal cavity with no lesion. np clear, tonsil wnl, tongue mobile and no masses, vocal cords mobile bilaterally with full ab and ad duction. Hypopharynx clear, open and no masses. Left sided TVF nodule with 1-2mm glottic gap- stable                    TSH   Date Value Ref Range Status   03/09/2023 9.090 (H) 0.270 - 4.200 uIU/mL Final   02/16/2023 2.030 0.270 - 4.200 uIU/mL Final     PTH   Date Value Ref Range Status   02/27/2023 31 15 - 65 pg/mL Final     Calcium   Date Value Ref Range Status   02/16/2023 9.5 8.6 - 10.2 mg/dL Final   11/21/2022 9.3 8.6 - 10.2 mg/dL Final        Medical Decision Making and Treatment Plan. 1. Patient seen and examined with papillary thyroid cancer of the left lobe. Subcentimeter nodule.-done  2. Discussed left hiram vs total thyroidectomy as nodule less than 1cm. - left hemithyroidectomy done 9/12/22 well healed  3. Pathology discussed of 1.3cm left papillary thyroid, + superior margin, no extrathyroidal extension.  Discussed with patient

## 2023-04-04 ENCOUNTER — CASE MANAGEMENT (OUTPATIENT)
Dept: OTOLARYNGOLOGY | Age: 63
End: 2023-04-04

## 2023-04-04 NOTE — PROGRESS NOTES
Patient discussed at [facility] multidisciplinary tumor board conference on: 4/4/23  Presenting Physician: Remington Thrasher DO    Summary    59y.o. year old male with history of stage [id number] papillary thyroid carcinoma now s/p completion thyroidectomy with no evidence of additional carcinoma. Primary Site: Left Thyroid  Histology: PTC  National Guidelines Discussed: Per NCCN    Recommendations  Tg and anti-Tg levels low as expected s/p total thyroidectomy and not indicative of any residual or recurrent disease. Patient to follow up with endocrine for titration of replacement hormone and discussion of any need for post-operative [place].      Electronically signed by Benny Haynes DO on 4/4/2023 at 8:30 AM

## 2023-05-04 ENCOUNTER — HOSPITAL ENCOUNTER (OUTPATIENT)
Age: 63
Discharge: HOME | End: 2023-05-04

## 2023-05-04 DIAGNOSIS — C73 THYROID CANCER (HCC): ICD-10-CM

## 2023-05-04 LAB
T4 FREE SERPL-MCNC: <0.1 NG/DL (ref 0.93–1.7)
TSH SERPL-MCNC: 89.05 UIU/ML (ref 0.27–4.2)

## 2023-05-05 ENCOUNTER — HOSPITAL ENCOUNTER (OUTPATIENT)
Dept: NUCLEAR MEDICINE | Age: 63
Discharge: HOME | End: 2023-05-05
Payer: OTHER GOVERNMENT

## 2023-05-05 ENCOUNTER — TELEPHONE (OUTPATIENT)
Dept: ENDOCRINOLOGY | Age: 63
End: 2023-05-05

## 2023-05-05 DIAGNOSIS — C73 THYROID CANCER (HCC): ICD-10-CM

## 2023-05-05 PROCEDURE — 79005 NUCLEAR RX ORAL ADMIN: CPT

## 2023-05-05 PROCEDURE — A9517 I131 IODIDE CAP, RX: HCPCS | Performed by: RADIOLOGY

## 2023-05-05 PROCEDURE — 3430000000 HC RX DIAGNOSTIC RADIOPHARMACEUTICAL: Performed by: RADIOLOGY

## 2023-05-05 RX ADMIN — SODIUM IODIDE I 131 200 MILLICURIE: 1 CAPSULE, GELATIN COATED ORAL at 13:00

## 2023-05-07 LAB — MISCELLANEOUS LAB TEST RESULT: NORMAL

## 2023-05-12 ENCOUNTER — HOSPITAL ENCOUNTER (OUTPATIENT)
Dept: NUCLEAR MEDICINE | Age: 63
Discharge: HOME | End: 2023-05-12
Payer: OTHER GOVERNMENT

## 2023-05-12 DIAGNOSIS — C73 THYROID CANCER (HCC): ICD-10-CM

## 2023-05-12 PROCEDURE — 78018 THYROID MET IMAGING BODY: CPT

## 2023-05-15 ENCOUNTER — TELEPHONE (OUTPATIENT)
Dept: ENDOCRINOLOGY | Age: 63
End: 2023-05-15

## 2023-05-15 NOTE — TELEPHONE ENCOUNTER
Endocrine staff,   Please notify pt that I have reviewed your recent results. Excellent! Thyroid uptake and scan was negative for any evidence of distal metastasis .  Will discuss this in details at net OV

## 2023-05-17 ENCOUNTER — APPOINTMENT (OUTPATIENT)
Dept: NUCLEAR MEDICINE | Age: 63
End: 2023-05-17
Payer: OTHER GOVERNMENT

## 2023-05-18 ENCOUNTER — OFFICE VISIT (OUTPATIENT)
Dept: ONCOLOGY | Age: 63
End: 2023-05-18
Payer: OTHER GOVERNMENT

## 2023-05-18 ENCOUNTER — HOSPITAL ENCOUNTER (OUTPATIENT)
Dept: INFUSION THERAPY | Age: 63
Discharge: HOME | End: 2023-05-18
Payer: OTHER GOVERNMENT

## 2023-05-18 VITALS
WEIGHT: 168.2 LBS | SYSTOLIC BLOOD PRESSURE: 121 MMHG | OXYGEN SATURATION: 96 % | HEART RATE: 64 BPM | DIASTOLIC BLOOD PRESSURE: 79 MMHG | HEIGHT: 68 IN | TEMPERATURE: 97.3 F | BODY MASS INDEX: 25.49 KG/M2

## 2023-05-18 DIAGNOSIS — R10.9 GASTRIC PAIN: Primary | ICD-10-CM

## 2023-05-18 DIAGNOSIS — C73 THYROID CANCER (HCC): ICD-10-CM

## 2023-05-18 DIAGNOSIS — C82.94 FOLLICULAR LYMPHOMA OF LYMPH NODES OF UPPER EXTREMITY, UNSPECIFIED GRADE (HCC): ICD-10-CM

## 2023-05-18 LAB
ALBUMIN SERPL-MCNC: 4.4 G/DL (ref 3.5–5.2)
ALP SERPL-CCNC: 71 U/L (ref 40–129)
ALT SERPL-CCNC: 20 U/L (ref 0–40)
ANION GAP SERPL CALCULATED.3IONS-SCNC: 11 MMOL/L (ref 7–16)
AST SERPL-CCNC: 34 U/L (ref 0–39)
BASOPHILS # BLD: 0.05 E9/L (ref 0–0.2)
BASOPHILS NFR BLD: 1 % (ref 0–2)
BILIRUB SERPL-MCNC: <0.2 MG/DL (ref 0–1.2)
BUN SERPL-MCNC: 21 MG/DL (ref 6–23)
CALCIUM SERPL-MCNC: 8.7 MG/DL (ref 8.6–10.2)
CHLORIDE SERPL-SCNC: 101 MMOL/L (ref 98–107)
CO2 SERPL-SCNC: 27 MMOL/L (ref 22–29)
CREAT SERPL-MCNC: 1 MG/DL (ref 0.7–1.2)
EOSINOPHIL # BLD: 0.1 E9/L (ref 0.05–0.5)
EOSINOPHIL NFR BLD: 1.9 % (ref 0–6)
ERYTHROCYTE [DISTWIDTH] IN BLOOD BY AUTOMATED COUNT: 12.5 FL (ref 11.5–15)
GLUCOSE SERPL-MCNC: 88 MG/DL (ref 74–99)
HCT VFR BLD AUTO: 37.3 % (ref 37–54)
HGB BLD-MCNC: 13 G/DL (ref 12.5–16.5)
IMM GRANULOCYTES # BLD: 0.01 E9/L
IMM GRANULOCYTES NFR BLD: 0.2 % (ref 0–5)
LDH SERPL-CCNC: 229 U/L (ref 135–225)
LYMPHOCYTES # BLD: 0.99 E9/L (ref 1.5–4)
LYMPHOCYTES NFR BLD: 19.2 % (ref 20–42)
MAGNESIUM SERPL-MCNC: 2.2 MG/DL (ref 1.6–2.6)
MCH RBC QN AUTO: 33.2 PG (ref 26–35)
MCHC RBC AUTO-ENTMCNC: 34.9 % (ref 32–34.5)
MCV RBC AUTO: 95.2 FL (ref 80–99.9)
MONOCYTES # BLD: 0.61 E9/L (ref 0.1–0.95)
MONOCYTES NFR BLD: 11.8 % (ref 2–12)
NEUTROPHILS # BLD: 3.39 E9/L (ref 1.8–7.3)
NEUTS SEG NFR BLD: 65.9 % (ref 43–80)
PLATELET # BLD AUTO: 243 E9/L (ref 130–450)
PMV BLD AUTO: 10.2 FL (ref 7–12)
POTASSIUM SERPL-SCNC: 4.4 MMOL/L (ref 3.5–5)
PROT SERPL-MCNC: 6.9 G/DL (ref 6.4–8.3)
RBC # BLD AUTO: 3.92 E12/L (ref 3.8–5.8)
SODIUM SERPL-SCNC: 139 MMOL/L (ref 132–146)
WBC # BLD: 5.2 E9/L (ref 4.5–11.5)

## 2023-05-18 PROCEDURE — 99214 OFFICE O/P EST MOD 30 MIN: CPT | Performed by: INTERNAL MEDICINE

## 2023-05-18 PROCEDURE — 83735 ASSAY OF MAGNESIUM: CPT

## 2023-05-18 PROCEDURE — 85025 COMPLETE CBC W/AUTO DIFF WBC: CPT

## 2023-05-18 PROCEDURE — 80053 COMPREHEN METABOLIC PANEL: CPT

## 2023-05-18 PROCEDURE — 36415 COLL VENOUS BLD VENIPUNCTURE: CPT

## 2023-05-18 PROCEDURE — 83615 LACTATE (LD) (LDH) ENZYME: CPT

## 2023-05-18 NOTE — PROGRESS NOTES
[id number] [facility]  [place] [address]  Dept: [place]: [phone]  Attending progress note      Reason for Visit:   Non-Hodgkin lymphoma. Referring Physician:  Apoorva Velazco MD    PCP:  Erica Soto MD    History of Present Illness:     Mr. Sundeep Galindo is a pleasant 65year old gentleman with a PMHx of trigeminal neuralgia who has been referred to us for follicular lymphoma. He initially had shingles in 7/2021 and felt a lymph node in his axillary area. He does not note progression of size or increase in number. Denies any weight loss, night sweats and low grade fevers. He had CT scan showed lymphadenopathy in the left axilla measuring 11-27 mm. 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 CD10 negative, [facility] has sent for [facility] and molecular sequencing. Lab work shows normal kidney function, no anemia. He also had a colonoscopy done by Dr. 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 He underwent L hemithyroidectomy on 9/12/2022 by Dr. Alessia Isaac. Surgical pathology revealed Left thyroid, left hemithyroidectomy: Tumor size: 1.3 cm in greatest dimension. Papillary carcinoma, follicular variant, infiltrative; Carcinoma is present at an inked peripheral margin. Lymphocytic thyroiditis. ENT recommended for completion thyroidectomy as pathology revealed 1.3 cm size, + superior margin, no extrathyroidal extension. Since his last visit, the patient was seen by Dr. Alessia Isaac and Dr. Pereira, he underwent on 3/7/2023 a completion thyroidectomy.   He had on 5/5/2023 radioactive iodine

## 2023-06-06 ENCOUNTER — HOSPITAL ENCOUNTER (OUTPATIENT)
Age: 63
Discharge: HOME | End: 2023-06-08

## 2023-06-06 PROCEDURE — 88305 TISSUE EXAM BY PATHOLOGIST: CPT

## 2023-06-26 ENCOUNTER — OFFICE VISIT (OUTPATIENT)
Dept: ENDOCRINOLOGY | Age: 63
End: 2023-06-26
Payer: OTHER GOVERNMENT

## 2023-06-26 VITALS
SYSTOLIC BLOOD PRESSURE: 135 MMHG | HEART RATE: 54 BPM | WEIGHT: 165 LBS | HEIGHT: 68 IN | BODY MASS INDEX: 25.01 KG/M2 | RESPIRATION RATE: 18 BRPM | OXYGEN SATURATION: 99 % | DIASTOLIC BLOOD PRESSURE: 76 MMHG

## 2023-06-26 DIAGNOSIS — E89.0 POSTOPERATIVE HYPOTHYROIDISM: Primary | ICD-10-CM

## 2023-06-26 DIAGNOSIS — C73 THYROID CANCER (HCC): ICD-10-CM

## 2023-06-26 PROCEDURE — 99214 OFFICE O/P EST MOD 30 MIN: CPT | Performed by: INTERNAL MEDICINE

## 2023-06-26 RX ORDER — LEVOTHYROXINE SODIUM 112 UG/1
112 TABLET ORAL DAILY
Qty: 30 TABLET | Refills: 5 | Status: SHIPPED | OUTPATIENT
Start: 2023-06-26

## 2023-08-24 ENCOUNTER — HOSPITAL ENCOUNTER (OUTPATIENT)
Dept: INFUSION THERAPY | Age: 63
Discharge: HOME | End: 2023-08-24
Payer: OTHER GOVERNMENT

## 2023-08-24 ENCOUNTER — OFFICE VISIT (OUTPATIENT)
Dept: ONCOLOGY | Age: 63
End: 2023-08-24
Payer: OTHER GOVERNMENT

## 2023-08-24 VITALS
SYSTOLIC BLOOD PRESSURE: 140 MMHG | DIASTOLIC BLOOD PRESSURE: 90 MMHG | HEART RATE: 56 BPM | BODY MASS INDEX: 24.66 KG/M2 | OXYGEN SATURATION: 99 % | TEMPERATURE: 97.7 F | WEIGHT: 162.2 LBS

## 2023-08-24 DIAGNOSIS — C73 THYROID CANCER (HCC): ICD-10-CM

## 2023-08-24 DIAGNOSIS — C82.94 FOLLICULAR LYMPHOMA OF LYMPH NODES OF UPPER EXTREMITY, UNSPECIFIED GRADE (HCC): ICD-10-CM

## 2023-08-24 DIAGNOSIS — C82.94 FOLLICULAR LYMPHOMA OF LYMPH NODES OF UPPER EXTREMITY, UNSPECIFIED GRADE (HCC): Primary | ICD-10-CM

## 2023-08-24 LAB
ALBUMIN SERPL-MCNC: 4.4 G/DL (ref 3.5–5.2)
ALP SERPL-CCNC: 70 U/L (ref 40–129)
ALT SERPL-CCNC: 13 U/L (ref 0–40)
ANION GAP SERPL CALCULATED.3IONS-SCNC: 10 MMOL/L (ref 7–16)
AST SERPL-CCNC: 21 U/L (ref 0–39)
BASOPHILS # BLD: 0.03 K/UL (ref 0–0.2)
BASOPHILS NFR BLD: 1 % (ref 0–2)
BILIRUB SERPL-MCNC: <0.2 MG/DL (ref 0–1.2)
BUN SERPL-MCNC: 21 MG/DL (ref 6–23)
CALCIUM SERPL-MCNC: 8.8 MG/DL (ref 8.6–10.2)
CHLORIDE SERPL-SCNC: 100 MMOL/L (ref 98–107)
CO2 SERPL-SCNC: 26 MMOL/L (ref 22–29)
CREAT SERPL-MCNC: 0.8 MG/DL (ref 0.7–1.2)
EOSINOPHIL # BLD: 0.06 K/UL (ref 0.05–0.5)
EOSINOPHILS RELATIVE PERCENT: 1 % (ref 0–6)
ERYTHROCYTE [DISTWIDTH] IN BLOOD BY AUTOMATED COUNT: 11.6 % (ref 11.5–15)
GFR SERPL CREATININE-BSD FRML MDRD: >60 ML/MIN/1.73M2
GLUCOSE SERPL-MCNC: 93 MG/DL (ref 74–99)
HCT VFR BLD AUTO: 37.5 % (ref 37–54)
HGB BLD-MCNC: 13.1 G/DL (ref 12.5–16.5)
IMM GRANULOCYTES # BLD AUTO: <0.03 K/UL (ref 0–0.58)
IMM GRANULOCYTES NFR BLD: 0 % (ref 0–5)
LDH SERPL-CCNC: 184 U/L (ref 135–225)
LYMPHOCYTES NFR BLD: 1.11 K/UL (ref 1.5–4)
LYMPHOCYTES RELATIVE PERCENT: 23 % (ref 20–42)
MAGNESIUM SERPL-MCNC: 2.1 MG/DL (ref 1.6–2.6)
MCH RBC QN AUTO: 33.4 PG (ref 26–35)
MCHC RBC AUTO-ENTMCNC: 34.9 G/DL (ref 32–34.5)
MCV RBC AUTO: 95.7 FL (ref 80–99.9)
MONOCYTES NFR BLD: 0.53 K/UL (ref 0.1–0.95)
MONOCYTES NFR BLD: 11 % (ref 2–12)
NEUTROPHILS NFR BLD: 65 % (ref 43–80)
NEUTS SEG NFR BLD: 3.18 K/UL (ref 1.8–7.3)
PLATELET # BLD AUTO: 234 K/UL (ref 130–450)
PMV BLD AUTO: 10.5 FL (ref 7–12)
POTASSIUM SERPL-SCNC: 4.6 MMOL/L (ref 3.5–5)
PROT SERPL-MCNC: 6.9 G/DL (ref 6.4–8.3)
RBC # BLD AUTO: 3.92 M/UL (ref 3.8–5.8)
SODIUM SERPL-SCNC: 136 MMOL/L (ref 132–146)
WBC OTHER # BLD: 4.9 K/UL (ref 4.5–11.5)

## 2023-08-24 PROCEDURE — 99214 OFFICE O/P EST MOD 30 MIN: CPT | Performed by: INTERNAL MEDICINE

## 2023-08-24 PROCEDURE — 85025 COMPLETE CBC W/AUTO DIFF WBC: CPT

## 2023-08-24 PROCEDURE — 99212 OFFICE O/P EST SF 10 MIN: CPT

## 2023-08-24 PROCEDURE — 83735 ASSAY OF MAGNESIUM: CPT

## 2023-08-24 PROCEDURE — 83615 LACTATE (LD) (LDH) ENZYME: CPT

## 2023-08-24 PROCEDURE — 80053 COMPREHEN METABOLIC PANEL: CPT

## 2023-08-24 PROCEDURE — 36415 COLL VENOUS BLD VENIPUNCTURE: CPT

## 2023-08-24 NOTE — PROGRESS NOTES
[address] [place] [address]  Dept: [address]: [phone]  Attending progress note      Reason for Visit:   Non-Hodgkin lymphoma. Referring Physician:  Tigre Ellsworth MD    PCP:  Marion Galeano MD    History of Present Illness:     Mr. Diana Whitley is a pleasant 59year old gentleman with a PMHx of trigeminal neuralgia who has been referred to us for follicular lymphoma. He initially had shingles in 7/2021 and felt a lymph node in his axillary area. He does not note progression of size or increase in number. Denies any weight loss, night sweats and low grade fevers. He had CT scan showed lymphadenopathy in the left axilla measuring 11-27 mm. 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 CD10 negative, [facility] has sent for [facility] and molecular sequencing. Lab work shows normal kidney function, no anemia. He also had a colonoscopy done by Dr. 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 He underwent L hemithyroidectomy on 9/12/2022 by Dr. Jose Weston. Surgical pathology revealed Left thyroid, left hemithyroidectomy: Tumor size: 1.3 cm in greatest dimension. Papillary carcinoma, follicular variant, infiltrative; Carcinoma is present at an inked peripheral margin. Lymphocytic thyroiditis. ENT recommended for completion thyroidectomy as pathology revealed 1.3 cm size, + superior margin, no extrathyroidal extension. Since his last visit, the patient was seen by Dr. Jose Weston and Dr. Ambar Rojas, he underwent on 3/7/2023 a completion thyroidectomy.   He had on 5/5/2023 radioactive iodine

## 2023-09-05 ENCOUNTER — OFFICE VISIT (OUTPATIENT)
Dept: ENT CLINIC | Age: 63
End: 2023-09-05
Payer: OTHER GOVERNMENT

## 2023-09-05 VITALS
HEIGHT: 68 IN | SYSTOLIC BLOOD PRESSURE: 142 MMHG | WEIGHT: 162 LBS | DIASTOLIC BLOOD PRESSURE: 86 MMHG | HEART RATE: 55 BPM | BODY MASS INDEX: 24.55 KG/M2

## 2023-09-05 DIAGNOSIS — C73 THYROID CANCER (HCC): Primary | ICD-10-CM

## 2023-09-05 PROCEDURE — 99213 OFFICE O/P EST LOW 20 MIN: CPT | Performed by: OTOLARYNGOLOGY

## 2023-09-05 NOTE — PROGRESS NOTES
Dear Dr Shahida Tan MD No ref. provider found     We had the pleasure of seeing Miriam Flores, 1960 here    on 9/5/2023  Please see below for review of care and plans. Chief complaint-   No diagnosis found. 2/27/23 - THYROIDECTOMY COMPLETION RIGHT SIDE     9/12/22 - LEFT COLLINS THYROIDECTOMY     History of Present Illness- Patient presents with papillary thyroid carcinoma of the left thyroid. 8 x 10 x 7 mm nodule was found on PET scan as patient with history of lymphoma. No B systems and is not requiring treatment at this time for the lymphoma. He reports raspy voice occasionally. No smoking. Social drinking. Pt does have history of trigeminal neuralgia. TSH 2.660    9/20/22: Pt here for p/o collins thyroidectomy. Lt side internal neck pain, tightness in neck. Very little difficulty swallowing, sore throat. Unable to become high pitched/become loud. Drinks 30oz of water, 2 cups of coffee, and no alcohol. Weight is stable. 12/13/22: Pt here for 3 mo collins thyroidectomy. Discomfort in lower neck when looking upwards, and a slight pinching sensation in lower lefty neck periodically pain. No difficulty swallowing. States has an issue pronouncing certain words and notices a gravel to voice. Drinks 30 oz of water, 2-3 cups of coffee, and occasional alcohol. Weight is stable. 3/9/23: Pt here for post op completion of Right thyroidectomy. Tenderness when tilting head back and stiffness in neck when looking left and right. Some difficulty swallowing due to stiffness, just notices it feels different to swallow. No change in voice. Drinks 30 oz of water, 2-3 cups of coffee, and occasional alcohol. Weight loss of 6 lbs since LOV 2/27/23.    9/5/23: Pt here for 6 mo follow up Thyroidectomy completion. No pain, mentions a tightness in neck when tilting head back far. No difficulty swallowing. No change in voice. Drinks 30oz of water, 2-3 cups of coffee, and occasional alcohol.  Weight is

## 2023-11-03 ENCOUNTER — HOSPITAL ENCOUNTER (OUTPATIENT)
Age: 63
Discharge: HOME | End: 2023-11-03
Payer: OTHER GOVERNMENT

## 2023-11-03 DIAGNOSIS — C73 THYROID CANCER (HCC): ICD-10-CM

## 2023-11-03 DIAGNOSIS — E89.0 POSTOPERATIVE HYPOTHYROIDISM: ICD-10-CM

## 2023-11-03 LAB
T4 FREE SERPL-MCNC: 1.1 NG/DL (ref 0.9–1.7)
TSH SERPL DL<=0.05 MIU/L-ACNC: 3.23 UIU/ML (ref 0.27–4.2)

## 2023-11-03 PROCEDURE — 84443 ASSAY THYROID STIM HORMONE: CPT

## 2023-11-03 PROCEDURE — 84432 ASSAY OF THYROGLOBULIN: CPT

## 2023-11-03 PROCEDURE — 84439 ASSAY OF FREE THYROXINE: CPT

## 2023-11-03 PROCEDURE — 36415 COLL VENOUS BLD VENIPUNCTURE: CPT

## 2023-11-03 PROCEDURE — 86800 THYROGLOBULIN ANTIBODY: CPT

## 2023-11-04 LAB
THYROGLOB AB SERPL-ACNC: <0.9 IU/ML (ref 0–4)
THYROGLOB SERPL-MCNC: <0.1 NG/ML (ref 1.3–31.8)
THYROGLOB SERPL-MCNC: ABNORMAL NG/ML (ref 1.3–31.8)

## 2023-11-07 ENCOUNTER — OFFICE VISIT (OUTPATIENT)
Dept: ENDOCRINOLOGY | Age: 63
End: 2023-11-07
Payer: OTHER GOVERNMENT

## 2023-11-07 VITALS
SYSTOLIC BLOOD PRESSURE: 121 MMHG | HEART RATE: 54 BPM | BODY MASS INDEX: 25.16 KG/M2 | WEIGHT: 166 LBS | DIASTOLIC BLOOD PRESSURE: 79 MMHG | HEIGHT: 68 IN | OXYGEN SATURATION: 99 %

## 2023-11-07 DIAGNOSIS — C73 THYROID CANCER (HCC): ICD-10-CM

## 2023-11-07 DIAGNOSIS — E89.0 POSTOPERATIVE HYPOTHYROIDISM: Primary | ICD-10-CM

## 2023-11-07 PROCEDURE — 99214 OFFICE O/P EST MOD 30 MIN: CPT | Performed by: INTERNAL MEDICINE

## 2023-11-07 RX ORDER — LEVOTHYROXINE SODIUM 112 UG/1
112 TABLET ORAL DAILY
Qty: 95 TABLET | Refills: 3 | Status: SHIPPED | OUTPATIENT
Start: 2023-11-07

## 2023-11-07 NOTE — PROGRESS NOTES
cancer ([address] [facility])  levothyroxine (SYNTHROID) 112 MCG tablet    TSH    T4, Free    T4, Free    TSH    Thyroglobulin    US THYROID            Plan:     1. Papillary thyroid cancer   Pt s/p Lt thyroid lobectomy and isthmusectomy 9/12/2022  Pathology report --> 1.3 x 1 x 0.7 cm  PTC, Margin status: Margin involved by carcinoma  Pt going for completion thyroidectomy in few weeks   S/p [place] 5/6/2023   We will check TFTs and TG level  We will also obtain thyroid ultrasound   2. Postoperative Hypothyroidism   The patient is currently on levothyroxine  112 mcg daily. Goal to keep TSH between 0.3-2.0     2. Follicular lymphoma  Following with hematology/oncology    I personally reviewed external notes from PCP and other patient's care team providers, and personally interpreted labs associated with the above diagnosis. I also ordered labs to further assess and manage the above addressed medical conditions    Return in about 1 year (around 11/7/2024) for hypothyroidism, thyroid cancer. The above issues were reviewed with the patient who understood and agreed with the plan. Thank you for allowing us to participate in the care of this patient. Please do not hesitate to contact us with any additional questions. Diagnosis Orders   1. Postoperative hypothyroidism  TSH    T4, Free    T4, Free    TSH      2. Thyroid cancer ([address] [facility])  levothyroxine (SYNTHROID) 112 MCG tablet    TSH    T4, Free    T4, Free    TSH    Thyroglobulin    US THYROID          Shakirebonie Plummer MD  [address] [facility]   [facility] [address]   Phone: [phone]  Fax: [phone]  --------------------------------------------  An electronic signature was used to authenticate this note.  Tammi Rabago MD on 11/7/2023 at 8:35 AM

## 2023-12-18 ENCOUNTER — HOSPITAL ENCOUNTER (OUTPATIENT)
Dept: INFUSION THERAPY | Age: 63
Discharge: HOME | End: 2023-12-18
Payer: OTHER GOVERNMENT

## 2023-12-18 DIAGNOSIS — C82.94 FOLLICULAR LYMPHOMA OF LYMPH NODES OF UPPER EXTREMITY, UNSPECIFIED GRADE (HCC): Primary | ICD-10-CM

## 2023-12-18 LAB
ALBUMIN SERPL-MCNC: 4.3 G/DL (ref 3.5–5.2)
ALP SERPL-CCNC: 64 U/L (ref 40–129)
ALT SERPL-CCNC: 16 U/L (ref 0–40)
ANION GAP SERPL CALCULATED.3IONS-SCNC: 8 MMOL/L (ref 7–16)
AST SERPL-CCNC: 23 U/L (ref 0–39)
BASOPHILS # BLD: 0.04 K/UL (ref 0–0.2)
BASOPHILS NFR BLD: 1 % (ref 0–2)
BILIRUB SERPL-MCNC: 0.2 MG/DL (ref 0–1.2)
BUN SERPL-MCNC: 21 MG/DL (ref 6–23)
CALCIUM SERPL-MCNC: 8.9 MG/DL (ref 8.6–10.2)
CHLORIDE SERPL-SCNC: 101 MMOL/L (ref 98–107)
CO2 SERPL-SCNC: 29 MMOL/L (ref 22–29)
CREAT SERPL-MCNC: 0.9 MG/DL (ref 0.7–1.2)
EOSINOPHIL # BLD: 0.15 K/UL (ref 0.05–0.5)
EOSINOPHILS RELATIVE PERCENT: 2 % (ref 0–6)
ERYTHROCYTE [DISTWIDTH] IN BLOOD BY AUTOMATED COUNT: 11.9 % (ref 11.5–15)
GFR SERPL CREATININE-BSD FRML MDRD: >60 ML/MIN/1.73M2
GLUCOSE SERPL-MCNC: 93 MG/DL (ref 74–99)
HCT VFR BLD AUTO: 37.7 % (ref 37–54)
HGB BLD-MCNC: 13.3 G/DL (ref 12.5–16.5)
IMM GRANULOCYTES # BLD AUTO: <0.03 K/UL (ref 0–0.58)
IMM GRANULOCYTES NFR BLD: 0 % (ref 0–5)
LDH SERPL-CCNC: 160 U/L (ref 135–225)
LYMPHOCYTES NFR BLD: 1.39 K/UL (ref 1.5–4)
LYMPHOCYTES RELATIVE PERCENT: 22 % (ref 20–42)
MAGNESIUM SERPL-MCNC: 2 MG/DL (ref 1.6–2.6)
MCH RBC QN AUTO: 33.2 PG (ref 26–35)
MCHC RBC AUTO-ENTMCNC: 35.3 G/DL (ref 32–34.5)
MCV RBC AUTO: 94 FL (ref 80–99.9)
MONOCYTES NFR BLD: 0.61 K/UL (ref 0.1–0.95)
MONOCYTES NFR BLD: 10 % (ref 2–12)
NEUTROPHILS NFR BLD: 65 % (ref 43–80)
NEUTS SEG NFR BLD: 4.05 K/UL (ref 1.8–7.3)
PLATELET # BLD AUTO: 214 K/UL (ref 130–450)
PMV BLD AUTO: 10 FL (ref 7–12)
POTASSIUM SERPL-SCNC: 4 MMOL/L (ref 3.5–5)
PROT SERPL-MCNC: 6.6 G/DL (ref 6.4–8.3)
RBC # BLD AUTO: 4.01 M/UL (ref 3.8–5.8)
SODIUM SERPL-SCNC: 138 MMOL/L (ref 132–146)
WBC OTHER # BLD: 6.3 K/UL (ref 4.5–11.5)

## 2023-12-18 PROCEDURE — 80053 COMPREHEN METABOLIC PANEL: CPT

## 2023-12-18 PROCEDURE — 83735 ASSAY OF MAGNESIUM: CPT

## 2023-12-18 PROCEDURE — 85025 COMPLETE CBC W/AUTO DIFF WBC: CPT

## 2023-12-18 PROCEDURE — 83615 LACTATE (LD) (LDH) ENZYME: CPT

## 2023-12-18 PROCEDURE — 36415 COLL VENOUS BLD VENIPUNCTURE: CPT

## 2023-12-28 ENCOUNTER — HOSPITAL ENCOUNTER (OUTPATIENT)
Age: 63
Discharge: HOME | End: 2023-12-28
Payer: OTHER GOVERNMENT

## 2023-12-28 DIAGNOSIS — C73 THYROID CANCER (HCC): ICD-10-CM

## 2023-12-28 DIAGNOSIS — E89.0 POSTOPERATIVE HYPOTHYROIDISM: ICD-10-CM

## 2023-12-28 LAB
T4 FREE SERPL-MCNC: 1.1 NG/DL (ref 0.9–1.7)
TSH SERPL DL<=0.05 MIU/L-ACNC: 1.89 UIU/ML (ref 0.27–4.2)

## 2023-12-28 PROCEDURE — 84443 ASSAY THYROID STIM HORMONE: CPT

## 2023-12-28 PROCEDURE — 84439 ASSAY OF FREE THYROXINE: CPT

## 2023-12-28 PROCEDURE — 36415 COLL VENOUS BLD VENIPUNCTURE: CPT

## 2024-01-01 ENCOUNTER — TELEPHONE (OUTPATIENT)
Dept: ENDOCRINOLOGY | Age: 64
End: 2024-01-01

## 2024-01-01 NOTE — TELEPHONE ENCOUNTER
Endocrine staff,   Please notify pt that I have reviewed your recent results.     Great!, thyroid hormones results are within an acceptable range of normal. There is no need for a change in your therapy at this time.

## 2024-01-09 ENCOUNTER — OFFICE VISIT (OUTPATIENT)
Dept: ENT CLINIC | Age: 64
End: 2024-01-09
Payer: OTHER GOVERNMENT

## 2024-01-09 VITALS
DIASTOLIC BLOOD PRESSURE: 87 MMHG | HEIGHT: 66 IN | WEIGHT: 164.4 LBS | SYSTOLIC BLOOD PRESSURE: 136 MMHG | HEART RATE: 59 BPM | BODY MASS INDEX: 26.42 KG/M2

## 2024-01-09 DIAGNOSIS — C73 PAPILLARY THYROID CARCINOMA (HCC): ICD-10-CM

## 2024-01-09 DIAGNOSIS — R49.0 DYSPHONIA: ICD-10-CM

## 2024-01-09 DIAGNOSIS — C73 THYROID CANCER (HCC): Primary | ICD-10-CM

## 2024-01-09 PROCEDURE — 99213 OFFICE O/P EST LOW 20 MIN: CPT | Performed by: OTOLARYNGOLOGY

## 2024-01-09 RX ORDER — CARBAMAZEPINE 200 MG/1
200 TABLET ORAL ONCE
COMMUNITY

## 2024-01-09 NOTE — PROGRESS NOTES
Dear Dr Lopez, Britton Manjarrez MD No ref. provider found     We had the pleasure of seeing Tam Milner, 1960 here    on 1/9/2024  Please see below for review of care and plans.     Chief complaint-     ICD-10-CM    1. Thyroid cancer (HCC)  C73       2. Papillary thyroid carcinoma (HCC)  C73       3. Dysphonia  R49.0             2/27/23 - THYROIDECTOMY COMPLETION RIGHT SIDE     9/12/22 - LEFT COLLINS THYROIDECTOMY     History of Present Illness- Patient presents with papillary thyroid carcinoma of the left thyroid. 8 x 10 x 7 mm nodule was found on PET scan as patient with history of lymphoma. No B systems and is not requiring treatment at this time for the lymphoma. He reports raspy voice occasionally. No smoking. Social drinking. Pt does have history of trigeminal neuralgia.     TSH 2.660    9/20/22: Pt here for p/o collins thyroidectomy. Lt side internal neck pain, tightness in neck. Very little difficulty swallowing, sore throat. Unable to become high pitched/become loud. Drinks 30oz of water, 2 cups of coffee, and no alcohol. Weight is stable.    12/13/22: Pt here for 3 mo collins thyroidectomy. Discomfort in lower neck when looking upwards, and a slight pinching sensation in lower lefty neck periodically pain. No difficulty swallowing. States has an issue pronouncing certain words and notices a gravel to voice. Drinks 30 oz of water, 2-3 cups of coffee, and occasional alcohol. Weight is stable.       3/9/23: Pt here for post op completion of Right thyroidectomy. Tenderness when tilting head back and stiffness in neck when looking left and right. Some difficulty swallowing due to stiffness, just notices it feels different to swallow. No change in voice. Drinks 30 oz of water, 2-3 cups of coffee, and occasional alcohol. Weight loss of 6 lbs since LOV 2/27/23.    9/5/23: Pt here for 6 mo follow up Thyroidectomy completion. No pain, mentions a tightness in neck when tilting head back far. No difficulty swallowing.

## 2024-01-09 NOTE — PROGRESS NOTES
1/9/24: Pt here for 4 mo completion of thyroidectomy. No pain, does mention some stiffness in arnulfo still when looking up. No difficulty swallowing. States voice is back to normal. Drinks 30oz of water, 2-3  cups of coffee, and occ alcohol. Weight is stable.

## 2024-03-18 ENCOUNTER — HOSPITAL ENCOUNTER (OUTPATIENT)
Dept: INFUSION THERAPY | Age: 64
Discharge: HOME | End: 2024-03-18
Payer: OTHER GOVERNMENT

## 2024-03-18 ENCOUNTER — OFFICE VISIT (OUTPATIENT)
Dept: ONCOLOGY | Age: 64
End: 2024-03-18
Payer: OTHER GOVERNMENT

## 2024-03-18 VITALS
BODY MASS INDEX: 26.24 KG/M2 | SYSTOLIC BLOOD PRESSURE: 126 MMHG | DIASTOLIC BLOOD PRESSURE: 83 MMHG | OXYGEN SATURATION: 100 % | WEIGHT: 162.6 LBS | TEMPERATURE: 97.9 F | HEART RATE: 56 BPM

## 2024-03-18 DIAGNOSIS — C73 THYROID CANCER (HCC): Primary | ICD-10-CM

## 2024-03-18 DIAGNOSIS — C73 THYROID CANCER (HCC): ICD-10-CM

## 2024-03-18 DIAGNOSIS — C61 PROSTATE CANCER (HCC): ICD-10-CM

## 2024-03-18 DIAGNOSIS — C82.94 FOLLICULAR LYMPHOMA OF LYMPH NODES OF UPPER EXTREMITY, UNSPECIFIED GRADE (HCC): Primary | ICD-10-CM

## 2024-03-18 LAB
ALBUMIN SERPL-MCNC: 4.6 G/DL (ref 3.5–5.2)
ALP SERPL-CCNC: 69 U/L (ref 40–129)
ALT SERPL-CCNC: 17 U/L (ref 0–40)
ANION GAP SERPL CALCULATED.3IONS-SCNC: 9 MMOL/L (ref 7–16)
AST SERPL-CCNC: 24 U/L (ref 0–39)
BASOPHILS # BLD: 0.03 K/UL (ref 0–0.2)
BASOPHILS NFR BLD: 1 % (ref 0–2)
BILIRUB SERPL-MCNC: 0.3 MG/DL (ref 0–1.2)
BUN SERPL-MCNC: 17 MG/DL (ref 6–23)
CALCIUM SERPL-MCNC: 8.8 MG/DL (ref 8.6–10.2)
CHLORIDE SERPL-SCNC: 98 MMOL/L (ref 98–107)
CO2 SERPL-SCNC: 29 MMOL/L (ref 22–29)
CREAT SERPL-MCNC: 0.8 MG/DL (ref 0.7–1.2)
EOSINOPHIL # BLD: 0.04 K/UL (ref 0.05–0.5)
EOSINOPHILS RELATIVE PERCENT: 1 % (ref 0–6)
ERYTHROCYTE [DISTWIDTH] IN BLOOD BY AUTOMATED COUNT: 12.2 % (ref 11.5–15)
GFR SERPL CREATININE-BSD FRML MDRD: >60 ML/MIN/1.73M2
GLUCOSE SERPL-MCNC: 99 MG/DL (ref 74–99)
HCT VFR BLD AUTO: 39.6 % (ref 37–54)
HGB BLD-MCNC: 13.6 G/DL (ref 12.5–16.5)
IMM GRANULOCYTES # BLD AUTO: <0.03 K/UL (ref 0–0.58)
IMM GRANULOCYTES NFR BLD: 0 % (ref 0–5)
LDH SERPL-CCNC: 161 U/L (ref 135–225)
LYMPHOCYTES NFR BLD: 1.13 K/UL (ref 1.5–4)
LYMPHOCYTES RELATIVE PERCENT: 23 % (ref 20–42)
MAGNESIUM SERPL-MCNC: 2 MG/DL (ref 1.6–2.6)
MCH RBC QN AUTO: 32.5 PG (ref 26–35)
MCHC RBC AUTO-ENTMCNC: 34.3 G/DL (ref 32–34.5)
MCV RBC AUTO: 94.7 FL (ref 80–99.9)
MONOCYTES NFR BLD: 0.55 K/UL (ref 0.1–0.95)
MONOCYTES NFR BLD: 11 % (ref 2–12)
NEUTROPHILS NFR BLD: 65 % (ref 43–80)
NEUTS SEG NFR BLD: 3.24 K/UL (ref 1.8–7.3)
PLATELET # BLD AUTO: 214 K/UL (ref 130–450)
PMV BLD AUTO: 9.7 FL (ref 7–12)
POTASSIUM SERPL-SCNC: 4.1 MMOL/L (ref 3.5–5)
PROT SERPL-MCNC: 7.3 G/DL (ref 6.4–8.3)
RBC # BLD AUTO: 4.18 M/UL (ref 3.8–5.8)
SODIUM SERPL-SCNC: 136 MMOL/L (ref 132–146)
WBC OTHER # BLD: 5 K/UL (ref 4.5–11.5)

## 2024-03-18 PROCEDURE — 99214 OFFICE O/P EST MOD 30 MIN: CPT | Performed by: INTERNAL MEDICINE

## 2024-03-18 PROCEDURE — 83615 LACTATE (LD) (LDH) ENZYME: CPT

## 2024-03-18 PROCEDURE — 85025 COMPLETE CBC W/AUTO DIFF WBC: CPT

## 2024-03-18 PROCEDURE — 80053 COMPREHEN METABOLIC PANEL: CPT

## 2024-03-18 PROCEDURE — 83735 ASSAY OF MAGNESIUM: CPT

## 2024-03-18 PROCEDURE — 99212 OFFICE O/P EST SF 10 MIN: CPT

## 2024-03-18 PROCEDURE — 36415 COLL VENOUS BLD VENIPUNCTURE: CPT

## 2024-03-18 NOTE — PROGRESS NOTES
activity associated with a left thyroid nodule.    The patient has stage IV follicular lymphoma, he does not have B symptoms, no bulky disease, labs reviewed, he does not have anemia or thrombocytopenia, he has lymphocytopenia, which is reactive LDH is normal, no indication for treatment at this time, recommended surveillance.    Thyroid papillary carcinoma, s/p left hemithyroidectomy with Dr. Garcia on September 12, 2022.  Surgical pathology report: Left thyroid, left hemithyroidectomy: Tumor size: 1.3 cm in greatest dimension. Papillary carcinoma, follicular variant, infiltrative; Carcinoma is present at an inked peripheral margin. Lymphocytic thyroiditis. ENT recommended for completion thyroidectomy as pathology revealed 1.3 cm size, + superior margin, no extrathyroidal extension.  The patient has positive margins, he had completed the treatment, there was residual carcinoma, on 5/5/2023 he received radioactive iodine treatment.  Thyroglobulin antibody is less than 0.9, thyroglobulin less than 0.1, TSH 3.23, T41.1, most recent TSH 1.89, continue follow-up with Dr. Crook.    Elevated PSA, the patient was diagnosed with prostate cancer, he had a prostatectomy done at [facility] by Dr. French on 7/10/2023, path:  A.  Prostate gland, radical prostatectomy:  - Prostatic adenocarcinoma, Blackwell score 4+5=9 (Grade Group 5) (see synoptic report).  - Positive for extraprostatic extension (pT3a).  - Surgical margins free of tumor.     B.  Lymph nodes, bilateral pelvic, dissection:  - Six lymph nodes with no evidence of metastatic disease (0/6) (see comment).     PSA had increased to 0.06 on 1/15/2024, 0.08 on 3/7/2024, the patient was referred to Dr. Payton for radiation therapy, he will be seen on 3/26, I will discuss the case with him after he sees Mr. Milner regarding possible ADT.    Lymphoma thyroid cancer and prostate cancer, discussed referral to genetic counseling.    Trigeminal neuralgia, recommended follow-up with

## 2024-03-26 ENCOUNTER — HOSPITAL ENCOUNTER (OUTPATIENT)
Dept: RADIATION ONCOLOGY | Age: 64
Discharge: HOME | End: 2024-03-26
Payer: OTHER GOVERNMENT

## 2024-03-26 ENCOUNTER — TELEPHONE (OUTPATIENT)
Dept: CASE MANAGEMENT | Age: 64
End: 2024-03-26

## 2024-03-26 VITALS
BODY MASS INDEX: 26.92 KG/M2 | HEART RATE: 74 BPM | OXYGEN SATURATION: 97 % | WEIGHT: 166.8 LBS | RESPIRATION RATE: 18 BRPM | DIASTOLIC BLOOD PRESSURE: 78 MMHG | TEMPERATURE: 97.6 F | SYSTOLIC BLOOD PRESSURE: 140 MMHG

## 2024-03-26 DIAGNOSIS — C61 PROSTATE CANCER (HCC): Primary | ICD-10-CM

## 2024-03-26 PROCEDURE — 99205 OFFICE O/P NEW HI 60 MIN: CPT

## 2024-03-26 PROCEDURE — 99245 OFF/OP CONSLTJ NEW/EST HI 55: CPT | Performed by: RADIOLOGY

## 2024-03-26 ASSESSMENT — PAIN DESCRIPTION - DESCRIPTORS: DESCRIPTORS: NUMBNESS;TINGLING

## 2024-03-26 ASSESSMENT — PAIN DESCRIPTION - LOCATION: LOCATION: FACE

## 2024-03-26 ASSESSMENT — PAIN SCALES - GENERAL: PAINLEVEL_OUTOF10: 2

## 2024-03-26 NOTE — TELEPHONE ENCOUNTER
Met with patient and his wife during the initial consult with Dr. Payton for his prostate adenocarcinoma. Introduced nurse navigator role, gave contact information and informed of other supportive services in clinic: , financial navigator and nutrition. Patient works outside the home and denied issues with living arrangements, transportation, insurance and prescription coverage. Discussed option of applying for FMLA if it will be of a benefit to him.  He continues to follow with [facility] urology and has an upcoming appointment with them. He is an established patient with Dr. Dwain Preston and is scheduled to see her on 06/17/2024. He stated he was told he'll need to see her before the June appointment for an ADT injection. Patient stated he is going out of town and won;t be able t come in until after 04/03/2024. He reported current appetite as good, denying unplanned weight loss and drinks an Ensure shake occasionally. He denied needs today, encouraged to call should any arise, he verbalized understanding. After patient left the clinic Dr. Payton was agreeable with patient seeing Dr. Dwain Preston within in the next couple of weeks regarding an ADT injection. Asked Rosibel, , to call patient with an appointment to see Dr. Dwain Preston the week of 04/08/2024 since she will be out of the office next week. Simi Park RN, ADN, BSE, OCN Patient Nurse Navigator

## 2024-03-26 NOTE — PROGRESS NOTES
Tam Milner  1960 64 y.o.      Referring Physician: Adama    PCP: Britton Lopez MD     Vitals:    03/26/24 0804   BP: (!) 140/78   Pulse: 74   Resp: 18   Temp: 97.6 °F (36.4 °C)   SpO2: 97%        Wt Readings from Last 3 Encounters:   03/26/24 75.7 kg (166 lb 12.8 oz)   03/18/24 73.8 kg (162 lb 9.6 oz)   01/09/24 74.6 kg (164 lb 6.4 oz)        Body mass index is 26.92 kg/m².               Chief Complaint: No chief complaint on file.         Cancer Staging   Thyroid cancer (HCC)  Staging form: Thyroid - Differentiated, AJCC 8th Edition  - Clinical stage from 10/10/2022: Stage I (cT1b, cNX, cM0, Age at diagnosis: >= 55 years) - Signed by Randy Garcia MD on 10/10/2022      Prior Radiation Therapy? NO    Concurrent Chemo/radiation? NO    Prior Chemotherapy? NO    Prior Hormonal Therapy? NO    Head and Neck Cancer? No, patient does NOT have HN cancer.          Current Outpatient Medications   Medication Sig Dispense Refill    carBAMazepine (TEGRETOL) 200 MG tablet Take 1 tablet by mouth once      levothyroxine (SYNTHROID) 112 MCG tablet Take 1 tablet by mouth daily Take 1 tablet 6 days a wk and 1.5 tablet on Sunday 95 tablet 3    tadalafil (CIALIS) 5 MG tablet take 1 tablet by mouth once daily      vitamin C (ASCORBIC ACID) 500 MG tablet Take 1 tablet by mouth 2 times daily      magnesium 30 MG tablet Take 25 mg by mouth daily      vitamin E 400 UNIT capsule Take 1 capsule by mouth daily      Zinc 25 MG TABS Take 1 tablet by mouth daily      Multiple Vitamins-Minerals (CENTRUM SILVER 50+MEN) TABS Take by mouth      rosuvastatin (CRESTOR) 5 MG tablet take 1 tablet by mouth once daily      LORazepam (ATIVAN) 0.5 MG tablet lorazepam 0.5 mg tablet      carBAMazepine (CARBATROL) 300 MG extended release capsule Take 1 capsule by mouth 3 times daily Morning and afternoon      levETIRAcetam (KEPPRA) 500 MG tablet Take 1 tablet by mouth 2 times daily Midday and evening      Lacosamide (VIMPAT PO) Take 100 mg

## 2024-03-26 NOTE — PROGRESS NOTES
Radiation Oncology      Richie Payton III, MD MS DABR   [facility]      Referring Physician: Dr. MIGUEL Galan      Primary Care Physician:Britton Lopez MD   Primary Oncologist: Dr. CARLOS Valadez      Diagnosis: biochemically recurrent prostate cancer s/p prostatectomy 7/10/23; NCCN high risk   -HO follicular lymphoma   -HO thyroid CA      Service:  Radiation Oncology consultation performed on 3/26/24        HPI:        Bacilio is a pleasant and well appearing 64 year old with a HO thyroid CA + follicular lymphoma, prostate cancer and trigeminal neuralgia.  He has PSA recurrence however never trended to undetectable.  Patient has a history of papillary thyroid carcinoma of the left thyroid measuring 8 x 10 x 7 mm which was detected on PET scan. He underwent L hemithyroidectomy on 9/12/2022 by Dr. Garcia. Surgical pathology revealed Left thyroid, left hemithyroidectomy: Tumor size: 1.3 cm in greatest dimension. Papillary carcinoma, follicular variant, infiltrative; Carcinoma is present at an inked peripheral margin. Lymphocytic thyroiditis. ENT recommended for completion thyroidectomy as pathology revealed 1.3 cm size, + superior margin, no extrathyroidal extension. Since his last visit, the patient was seen by Dr. Garcia and Dr. Modi, he underwent on 3/7/2023 a completion thyroidectomy. He had on 5/5/2023 radioactive iodine treatment. Patient stated he always had a increased PSA so it was under surveillance. PSA 9/11/2023 0.03, 10/20/2023 0.04, 1/15/2024. Patient stated MRI pelvis at [facility] and nothing appeared and same for PET on 2/7/22. A biopsy was done on 6/6/23 d/t the increase in PSA even though nothing was detected on scans. Pathology came back as adenocarcinoma frank 4+5=9. The patient had a prostatectomy done at [facility] by Dr. French on 7/10/2023. Pathology came back as prostatic adenocarcinoma, frank 4+5=9, all margins were

## 2024-03-27 ENCOUNTER — TELEPHONE (OUTPATIENT)
Dept: RADIATION ONCOLOGY | Age: 64
End: 2024-03-27

## 2024-03-28 DIAGNOSIS — C61 PROSTATE CANCER (HCC): Primary | ICD-10-CM

## 2024-03-28 RX ORDER — DIPHENHYDRAMINE HYDROCHLORIDE 50 MG/ML
50 INJECTION INTRAMUSCULAR; INTRAVENOUS
OUTPATIENT
Start: 2024-04-01

## 2024-03-28 RX ORDER — ALBUTEROL SULFATE 90 UG/1
4 AEROSOL, METERED RESPIRATORY (INHALATION) PRN
OUTPATIENT
Start: 2024-04-01

## 2024-03-28 RX ORDER — ONDANSETRON 2 MG/ML
8 INJECTION INTRAMUSCULAR; INTRAVENOUS
OUTPATIENT
Start: 2024-04-01

## 2024-03-28 RX ORDER — EPINEPHRINE 1 MG/ML
0.3 INJECTION, SOLUTION, CONCENTRATE INTRAVENOUS PRN
OUTPATIENT
Start: 2024-04-01

## 2024-03-28 RX ORDER — SODIUM CHLORIDE 9 MG/ML
INJECTION, SOLUTION INTRAVENOUS CONTINUOUS
OUTPATIENT
Start: 2024-04-01

## 2024-03-28 RX ORDER — ACETAMINOPHEN 325 MG/1
650 TABLET ORAL
OUTPATIENT
Start: 2024-04-01

## 2024-03-28 NOTE — TELEPHONE ENCOUNTER
Late entry 3/26/24      SW met with pt re: positive distress screen.  Pt reported that he had some concerns around work and was anxious due to this being a recurrence at this time.  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  SW stated that when he is sure of days and what he would want this could be brought up with physician and completed.  Pt denied any other concerns at this time.  Pt was encouraged to reach out to this provider should any other needs arise.       Jason Fuentes MSW, SADAW-S  Oncology Social Worker

## 2024-03-28 NOTE — PROGRESS NOTES
Diagnosis updated to problem list by Katherin Castillo [facility], due to new billing requirements, based on transcribed order / progress note 3/18/24 from Dr. Dwain Preston..

## 2024-04-03 ENCOUNTER — HOSPITAL ENCOUNTER (OUTPATIENT)
Dept: INFUSION THERAPY | Age: 64
Discharge: HOME | End: 2024-04-03
Payer: OTHER GOVERNMENT

## 2024-04-03 DIAGNOSIS — C82.94 FOLLICULAR LYMPHOMA OF LYMPH NODES OF UPPER EXTREMITY, UNSPECIFIED GRADE (HCC): ICD-10-CM

## 2024-04-03 DIAGNOSIS — C61 PROSTATE CANCER (HCC): Primary | ICD-10-CM

## 2024-04-03 DIAGNOSIS — C73 THYROID CANCER (HCC): ICD-10-CM

## 2024-04-03 DIAGNOSIS — C73 THYROID CANCER (HCC): Primary | ICD-10-CM

## 2024-04-03 LAB
ALBUMIN SERPL-MCNC: 4.4 G/DL (ref 3.5–5.2)
ALP SERPL-CCNC: 61 U/L (ref 40–129)
ALT SERPL-CCNC: 14 U/L (ref 0–40)
ANION GAP SERPL CALCULATED.3IONS-SCNC: 9 MMOL/L (ref 7–16)
AST SERPL-CCNC: 21 U/L (ref 0–39)
BASOPHILS # BLD: 0.03 K/UL (ref 0–0.2)
BASOPHILS NFR BLD: 1 % (ref 0–2)
BILIRUB SERPL-MCNC: 0.4 MG/DL (ref 0–1.2)
BUN SERPL-MCNC: 18 MG/DL (ref 6–23)
CALCIUM SERPL-MCNC: 8.8 MG/DL (ref 8.6–10.2)
CHLORIDE SERPL-SCNC: 99 MMOL/L (ref 98–107)
CO2 SERPL-SCNC: 28 MMOL/L (ref 22–29)
CREAT SERPL-MCNC: 0.8 MG/DL (ref 0.7–1.2)
EOSINOPHIL # BLD: 0.08 K/UL (ref 0.05–0.5)
EOSINOPHILS RELATIVE PERCENT: 2 % (ref 0–6)
ERYTHROCYTE [DISTWIDTH] IN BLOOD BY AUTOMATED COUNT: 12.1 % (ref 11.5–15)
GFR SERPL CREATININE-BSD FRML MDRD: >90 ML/MIN/1.73M2
GLUCOSE SERPL-MCNC: 91 MG/DL (ref 74–99)
HCT VFR BLD AUTO: 35.8 % (ref 37–54)
HGB BLD-MCNC: 12.7 G/DL (ref 12.5–16.5)
IMM GRANULOCYTES # BLD AUTO: <0.03 K/UL (ref 0–0.58)
IMM GRANULOCYTES NFR BLD: 0 % (ref 0–5)
LDH SERPL-CCNC: 164 U/L (ref 135–225)
LYMPHOCYTES NFR BLD: 1.19 K/UL (ref 1.5–4)
LYMPHOCYTES RELATIVE PERCENT: 24 % (ref 20–42)
MAGNESIUM SERPL-MCNC: 2 MG/DL (ref 1.6–2.6)
MCH RBC QN AUTO: 33.2 PG (ref 26–35)
MCHC RBC AUTO-ENTMCNC: 35.5 G/DL (ref 32–34.5)
MCV RBC AUTO: 93.5 FL (ref 80–99.9)
MONOCYTES NFR BLD: 0.54 K/UL (ref 0.1–0.95)
MONOCYTES NFR BLD: 11 % (ref 2–12)
NEUTROPHILS NFR BLD: 63 % (ref 43–80)
NEUTS SEG NFR BLD: 3.17 K/UL (ref 1.8–7.3)
PLATELET # BLD AUTO: 208 K/UL (ref 130–450)
PMV BLD AUTO: 9.8 FL (ref 7–12)
POTASSIUM SERPL-SCNC: 4.1 MMOL/L (ref 3.5–5)
PROT SERPL-MCNC: 6.8 G/DL (ref 6.4–8.3)
RBC # BLD AUTO: 3.83 M/UL (ref 3.8–5.8)
SODIUM SERPL-SCNC: 136 MMOL/L (ref 132–146)
WBC OTHER # BLD: 5 K/UL (ref 4.5–11.5)

## 2024-04-03 PROCEDURE — 36415 COLL VENOUS BLD VENIPUNCTURE: CPT

## 2024-04-03 PROCEDURE — 83615 LACTATE (LD) (LDH) ENZYME: CPT

## 2024-04-03 PROCEDURE — 80053 COMPREHEN METABOLIC PANEL: CPT

## 2024-04-03 PROCEDURE — 83735 ASSAY OF MAGNESIUM: CPT

## 2024-04-03 PROCEDURE — 85025 COMPLETE CBC W/AUTO DIFF WBC: CPT

## 2024-04-04 ENCOUNTER — HOSPITAL ENCOUNTER (OUTPATIENT)
Dept: INFUSION THERAPY | Age: 64
Discharge: HOME | End: 2024-04-04
Payer: OTHER GOVERNMENT

## 2024-04-04 ENCOUNTER — OFFICE VISIT (OUTPATIENT)
Dept: ONCOLOGY | Age: 64
End: 2024-04-04
Payer: OTHER GOVERNMENT

## 2024-04-04 VITALS
OXYGEN SATURATION: 98 % | WEIGHT: 166.2 LBS | SYSTOLIC BLOOD PRESSURE: 148 MMHG | DIASTOLIC BLOOD PRESSURE: 90 MMHG | RESPIRATION RATE: 16 BRPM | BODY MASS INDEX: 26.83 KG/M2 | TEMPERATURE: 98.2 F | HEART RATE: 60 BPM

## 2024-04-04 DIAGNOSIS — C73 THYROID CANCER (HCC): ICD-10-CM

## 2024-04-04 DIAGNOSIS — C82.94 FOLLICULAR LYMPHOMA OF LYMPH NODES OF UPPER EXTREMITY, UNSPECIFIED GRADE (HCC): ICD-10-CM

## 2024-04-04 DIAGNOSIS — C61 PROSTATE CANCER (HCC): Primary | ICD-10-CM

## 2024-04-04 PROCEDURE — 99215 OFFICE O/P EST HI 40 MIN: CPT | Performed by: INTERNAL MEDICINE

## 2024-04-04 PROCEDURE — 96402 CHEMO HORMON ANTINEOPL SQ/IM: CPT

## 2024-04-04 PROCEDURE — 6360000002 HC RX W HCPCS: Performed by: INTERNAL MEDICINE

## 2024-04-04 RX ORDER — ALBUTEROL SULFATE 90 UG/1
4 AEROSOL, METERED RESPIRATORY (INHALATION) PRN
OUTPATIENT
Start: 2024-06-27

## 2024-04-04 RX ORDER — SODIUM CHLORIDE 9 MG/ML
INJECTION, SOLUTION INTRAVENOUS CONTINUOUS
OUTPATIENT
Start: 2024-06-27

## 2024-04-04 RX ORDER — ONDANSETRON 2 MG/ML
8 INJECTION INTRAMUSCULAR; INTRAVENOUS
OUTPATIENT
Start: 2024-06-27

## 2024-04-04 RX ORDER — ACETAMINOPHEN 325 MG/1
650 TABLET ORAL
OUTPATIENT
Start: 2024-06-27

## 2024-04-04 RX ORDER — DIPHENHYDRAMINE HYDROCHLORIDE 50 MG/ML
50 INJECTION INTRAMUSCULAR; INTRAVENOUS
OUTPATIENT
Start: 2024-06-27

## 2024-04-04 RX ORDER — FAMOTIDINE 10 MG/ML
20 INJECTION, SOLUTION INTRAVENOUS
OUTPATIENT
Start: 2024-06-27

## 2024-04-04 RX ORDER — EPINEPHRINE 1 MG/ML
0.3 INJECTION, SOLUTION, CONCENTRATE INTRAVENOUS PRN
OUTPATIENT
Start: 2024-06-27

## 2024-04-04 RX ADMIN — LEUPROLIDE ACETATE 22.5 MG: KIT at 10:42

## 2024-04-04 NOTE — PROGRESS NOTES
Requesting Physician:      CHIEF COMPLAINT:   Chief Complaint   Patient presents with    Follow-up     lymphoma       PRIMARY HEMATOLOGIC/ONCOLOGIC DIAGNOSES:  Adenocarcinoma of the prostate. Debo score 4+5=9.  2.  Follicular lymphoma, Grade 1-2  3. Papillary thyroid cancer. S/p L hemithyroidectomy on 9/12/2022       STAGING:   Cancer Staging   Thyroid cancer (HCC)  Staging form: Thyroid - Differentiated, AJCC 8th Edition  - Clinical stage from 10/10/2022: Stage I (cT1b, cNX, cM0, Age at diagnosis: >= 55 years) - Signed by Randy Garcia MD on 10/10/2022       PATHOLOGY:  Diagnosis:     A. Prostate, right base, biopsy: Benign prostate tissue        B. Prostate, right mid, biopsy: Prostatic adenocarcinoma (acinar, not        otherwise specified).           Debo Score: 4+4 = 8; cribriform pattern absent           Grade Group:  4           Tissue Cores Involved by Carcinoma: 1 of 1           Percentage of Tissue Involved by Carcinoma: 5%           Perineural Invasion: Not identified        C. Prostate, right apex, biopsy: Benign prostate tissue        D. Prostate, left apex, biopsy: Prostatic adenocarcinoma (acinar, not        otherwise specified).           Mansfield Score: 4+5 = 9           Grade Group:  5           Tissue Cores Involved by Carcinoma: 2 of 3           Percentage of Tissue Involved by Carcinoma: 10%           Perineural Invasion: Not identified        E. Prostate, left mid, biopsy: Prostatic adenocarcinoma (acinar, not        otherwise specified).           Debo Score: 4+5 = 9           Grade Group:  5           Tissue Cores Involved by Carcinoma: 2 of 3           Percentage of Tissue Involved by Carcinoma: 30%           Perineural Invasion: Present        F. Prostate, left base, biopsy: Prostatic adenocarcinoma (acinar, not        otherwise specified).           Debo Score: 4+5 = 9           Grade Group:  5

## 2024-04-05 ENCOUNTER — CLINICAL DOCUMENTATION (OUTPATIENT)
Facility: HOSPITAL | Age: 64
End: 2024-04-05

## 2024-04-05 NOTE — PROGRESS NOTES
Met with patient to discuss lupron injection and cost.  Explained to patient deductible and OOP max and how that works under the medical plan.  Unfortunately at this time there are no copay cards to help with cost.  Patient states that he would like to call his insurance company to see what his cost will be.  Screened patient for financial aid and unfortunately patient is over income.  Explained to patient that any balance that is left if the account is paid in full then patient would qualify for a 15% discount and that we also have payment plans available.  Patient verbalizes understanding and is appreciative.

## 2024-04-07 PROBLEM — C82.94: Status: ACTIVE | Noted: 2024-04-07

## 2024-04-18 ENCOUNTER — HOSPITAL ENCOUNTER (OUTPATIENT)
Dept: RADIATION ONCOLOGY | Age: 64
Discharge: HOME | End: 2024-04-18
Payer: OTHER GOVERNMENT

## 2024-05-02 ENCOUNTER — HOSPITAL ENCOUNTER (OUTPATIENT)
Dept: INFUSION THERAPY | Age: 64
Discharge: HOME | End: 2024-05-02
Payer: OTHER GOVERNMENT

## 2024-05-02 ENCOUNTER — OFFICE VISIT (OUTPATIENT)
Dept: ONCOLOGY | Age: 64
End: 2024-05-02
Payer: OTHER GOVERNMENT

## 2024-05-02 VITALS
HEART RATE: 63 BPM | TEMPERATURE: 96.6 F | SYSTOLIC BLOOD PRESSURE: 128 MMHG | RESPIRATION RATE: 16 BRPM | BODY MASS INDEX: 26.23 KG/M2 | WEIGHT: 162.5 LBS | DIASTOLIC BLOOD PRESSURE: 83 MMHG | OXYGEN SATURATION: 96 %

## 2024-05-02 DIAGNOSIS — C82.94 FOLLICULAR LYMPHOMA OF LYMPH NODES OF UPPER EXTREMITY, UNSPECIFIED GRADE (HCC): Primary | ICD-10-CM

## 2024-05-02 DIAGNOSIS — C61 PROSTATE CANCER (HCC): Primary | ICD-10-CM

## 2024-05-02 DIAGNOSIS — C73 THYROID CANCER (HCC): ICD-10-CM

## 2024-05-02 DIAGNOSIS — C82.94 FOLLICULAR LYMPHOMA OF LYMPH NODES OF UPPER EXTREMITY, UNSPECIFIED GRADE (HCC): ICD-10-CM

## 2024-05-02 LAB
ALBUMIN SERPL-MCNC: 4.6 G/DL (ref 3.5–5.2)
ALP SERPL-CCNC: 82 U/L (ref 40–129)
ALT SERPL-CCNC: 17 U/L (ref 0–40)
ANION GAP SERPL CALCULATED.3IONS-SCNC: 9 MMOL/L (ref 7–16)
AST SERPL-CCNC: 24 U/L (ref 0–39)
BASOPHILS # BLD: 0.04 K/UL (ref 0–0.2)
BASOPHILS NFR BLD: 1 % (ref 0–2)
BILIRUB SERPL-MCNC: 0.2 MG/DL (ref 0–1.2)
BUN SERPL-MCNC: 15 MG/DL (ref 6–23)
CALCIUM SERPL-MCNC: 8.9 MG/DL (ref 8.6–10.2)
CHLORIDE SERPL-SCNC: 95 MMOL/L (ref 98–107)
CO2 SERPL-SCNC: 28 MMOL/L (ref 22–29)
CREAT SERPL-MCNC: 0.8 MG/DL (ref 0.7–1.2)
EOSINOPHIL # BLD: 0.09 K/UL (ref 0.05–0.5)
EOSINOPHILS RELATIVE PERCENT: 2 % (ref 0–6)
ERYTHROCYTE [DISTWIDTH] IN BLOOD BY AUTOMATED COUNT: 11.4 % (ref 11.5–15)
GFR, ESTIMATED: >90 ML/MIN/1.73M2
GLUCOSE SERPL-MCNC: 91 MG/DL (ref 74–99)
HCT VFR BLD AUTO: 39.3 % (ref 37–54)
HGB BLD-MCNC: 14 G/DL (ref 12.5–16.5)
IMM GRANULOCYTES # BLD AUTO: <0.03 K/UL (ref 0–0.58)
IMM GRANULOCYTES NFR BLD: 0 % (ref 0–5)
LDH SERPL-CCNC: 159 U/L (ref 135–225)
LYMPHOCYTES NFR BLD: 1 K/UL (ref 1.5–4)
LYMPHOCYTES RELATIVE PERCENT: 26 % (ref 20–42)
MAGNESIUM SERPL-MCNC: 2.1 MG/DL (ref 1.6–2.6)
MCH RBC QN AUTO: 32.6 PG (ref 26–35)
MCHC RBC AUTO-ENTMCNC: 35.6 G/DL (ref 32–34.5)
MCV RBC AUTO: 91.4 FL (ref 80–99.9)
MONOCYTES NFR BLD: 0.48 K/UL (ref 0.1–0.95)
MONOCYTES NFR BLD: 13 % (ref 2–12)
NEUTROPHILS NFR BLD: 58 % (ref 43–80)
NEUTS SEG NFR BLD: 2.19 K/UL (ref 1.8–7.3)
PLATELET # BLD AUTO: 221 K/UL (ref 130–450)
PMV BLD AUTO: 9.8 FL (ref 7–12)
POTASSIUM SERPL-SCNC: 4.5 MMOL/L (ref 3.5–5)
PROT SERPL-MCNC: 7.1 G/DL (ref 6.4–8.3)
RBC # BLD AUTO: 4.3 M/UL (ref 3.8–5.8)
SODIUM SERPL-SCNC: 132 MMOL/L (ref 132–146)
WBC OTHER # BLD: 3.8 K/UL (ref 4.5–11.5)

## 2024-05-02 PROCEDURE — 83615 LACTATE (LD) (LDH) ENZYME: CPT

## 2024-05-02 PROCEDURE — 99214 OFFICE O/P EST MOD 30 MIN: CPT | Performed by: INTERNAL MEDICINE

## 2024-05-02 PROCEDURE — 80053 COMPREHEN METABOLIC PANEL: CPT

## 2024-05-02 PROCEDURE — 36415 COLL VENOUS BLD VENIPUNCTURE: CPT

## 2024-05-02 PROCEDURE — 83735 ASSAY OF MAGNESIUM: CPT

## 2024-05-02 PROCEDURE — 85025 COMPLETE CBC W/AUTO DIFF WBC: CPT

## 2024-05-02 NOTE — PROGRESS NOTES
[facility] MEDICAL ONCOLOGY  [address] [address]  Dept: [phone]  Loc: [phone]  Attending progress note      Reason for Visit:   Non-Hodgkin lymphoma.    Referring Physician:  Bari Alvarenga MD    PCP:  Britton Lopez MD    History of Present Illness:     Mr. Chappell is a pleasant 63 year old gentleman with a PMHx of trigeminal neuralgia who has been referred to us for follicular lymphoma. He initially had shingles in 7/2021 and felt a lymph node in his axillary area. He does not note progression of size or increase in number. Denies any weight loss, night sweats and low grade fevers. He had CT scan showed lymphadenopathy in the left axilla measuring 11-27 mm. 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 CD10 negative, [facility] has sent for FISH and molecular sequencing. Lab work shows normal kidney function, no anemia. He also had a colonoscopy done by Dr. 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 He underwent L hemithyroidectomy on 9/12/2022 by Dr. Garcia. Surgical pathology revealed Left thyroid, left hemithyroidectomy: Tumor size: 1.3 cm in greatest dimension. Papillary carcinoma, follicular variant, infiltrative; Carcinoma is present at an inked peripheral margin. Lymphocytic thyroiditis. ENT recommended for completion thyroidectomy as pathology revealed 1.3 cm size, + superior margin, no extrathyroidal extension.  Since his last visit, the patient was seen by Dr. Garcia and Dr. Modi, he underwent on 3/7/2023 a completion thyroidectomy.  He had on 5/5/2023 radioactive iodine

## 2024-05-04 ENCOUNTER — HOSPITAL ENCOUNTER (OUTPATIENT)
Age: 64
Discharge: HOME | End: 2024-05-04
Payer: OTHER GOVERNMENT

## 2024-05-04 DIAGNOSIS — C73 THYROID CANCER (HCC): ICD-10-CM

## 2024-05-04 DIAGNOSIS — E89.0 POSTOPERATIVE HYPOTHYROIDISM: ICD-10-CM

## 2024-05-04 LAB
T4 FREE SERPL-MCNC: 1.3 NG/DL (ref 0.9–1.7)
TSH SERPL DL<=0.05 MIU/L-ACNC: 0.37 UIU/ML (ref 0.27–4.2)

## 2024-05-04 PROCEDURE — 84443 ASSAY THYROID STIM HORMONE: CPT

## 2024-05-04 PROCEDURE — 86800 THYROGLOBULIN ANTIBODY: CPT

## 2024-05-04 PROCEDURE — 84439 ASSAY OF FREE THYROXINE: CPT

## 2024-05-04 PROCEDURE — 36415 COLL VENOUS BLD VENIPUNCTURE: CPT

## 2024-05-06 ENCOUNTER — TELEPHONE (OUTPATIENT)
Dept: ENDOCRINOLOGY | Age: 64
End: 2024-05-06

## 2024-05-06 NOTE — TELEPHONE ENCOUNTER
Endocrine staff,   Please notify pt that I have reviewed your recent results.     Great!, thyroid hormones results are at goal. There is no need for a change in your therapy at this time.

## 2024-05-12 ENCOUNTER — TELEPHONE (OUTPATIENT)
Dept: ENDOCRINOLOGY | Age: 64
End: 2024-05-12

## 2024-05-12 NOTE — TELEPHONE ENCOUNTER
Notify patient  Excellent your thyroid tumor markers completely undetectable..  Your thyroid hormones are at goal, continue current dose of thyroid medication

## 2024-05-13 ENCOUNTER — HOSPITAL ENCOUNTER (OUTPATIENT)
Dept: RADIATION ONCOLOGY | Age: 64
Discharge: HOME | End: 2024-05-13
Payer: OTHER GOVERNMENT

## 2024-05-13 PROCEDURE — 77338 DESIGN MLC DEVICE FOR IMRT: CPT | Performed by: RADIOLOGY

## 2024-05-13 PROCEDURE — 77300 RADIATION THERAPY DOSE PLAN: CPT | Performed by: RADIOLOGY

## 2024-05-13 PROCEDURE — 77301 RADIOTHERAPY DOSE PLAN IMRT: CPT | Performed by: RADIOLOGY

## 2024-05-20 ENCOUNTER — HOSPITAL ENCOUNTER (OUTPATIENT)
Dept: RADIATION ONCOLOGY | Age: 64
Discharge: HOME | End: 2024-05-20
Payer: OTHER GOVERNMENT

## 2024-05-20 PROCEDURE — 77385 HC NTSTY MODUL RAD TX DLVR SMPL: CPT | Performed by: RADIOLOGY

## 2024-05-20 PROCEDURE — 77014 CHG CT GUIDANCE RADIATION THERAPY FLDS PLACEMENT: CPT | Performed by: RADIOLOGY

## 2024-05-21 ENCOUNTER — HOSPITAL ENCOUNTER (OUTPATIENT)
Dept: RADIATION ONCOLOGY | Age: 64
Discharge: HOME | End: 2024-05-21
Payer: OTHER GOVERNMENT

## 2024-05-21 PROCEDURE — 77014 CHG CT GUIDANCE RADIATION THERAPY FLDS PLACEMENT: CPT | Performed by: RADIOLOGY

## 2024-05-21 PROCEDURE — 77385 HC NTSTY MODUL RAD TX DLVR SMPL: CPT | Performed by: RADIOLOGY

## 2024-05-22 ENCOUNTER — HOSPITAL ENCOUNTER (OUTPATIENT)
Dept: RADIATION ONCOLOGY | Age: 64
Discharge: HOME | End: 2024-05-22
Payer: OTHER GOVERNMENT

## 2024-05-22 PROCEDURE — 77385 HC NTSTY MODUL RAD TX DLVR SMPL: CPT | Performed by: RADIOLOGY

## 2024-05-22 PROCEDURE — 77014 CHG CT GUIDANCE RADIATION THERAPY FLDS PLACEMENT: CPT | Performed by: RADIOLOGY

## 2024-05-23 ENCOUNTER — HOSPITAL ENCOUNTER (OUTPATIENT)
Dept: RADIATION ONCOLOGY | Age: 64
Discharge: HOME | End: 2024-05-23
Payer: OTHER GOVERNMENT

## 2024-05-23 VITALS
RESPIRATION RATE: 18 BRPM | WEIGHT: 163.4 LBS | OXYGEN SATURATION: 99 % | BODY MASS INDEX: 26.37 KG/M2 | HEART RATE: 57 BPM

## 2024-05-23 DIAGNOSIS — C80.1 CANCER (HCC): Primary | ICD-10-CM

## 2024-05-23 PROCEDURE — 77385 HC NTSTY MODUL RAD TX DLVR SMPL: CPT | Performed by: RADIOLOGY

## 2024-05-23 PROCEDURE — 77014 CHG CT GUIDANCE RADIATION THERAPY FLDS PLACEMENT: CPT | Performed by: RADIOLOGY

## 2024-05-23 ASSESSMENT — PAIN DESCRIPTION - LOCATION: LOCATION: JAW

## 2024-05-23 ASSESSMENT — PAIN DESCRIPTION - DESCRIPTORS: DESCRIPTORS: SHOOTING;SHARP

## 2024-05-23 NOTE — PROGRESS NOTES
Tam Milner  2023  Wt Readings from Last 3 Encounters:   24 74.1 kg (163 lb 6.4 oz)   24 73.7 kg (162 lb 8 oz)   24 75.4 kg (166 lb 3.2 oz)     Body mass index is 26.37 kg/m².        Treatment Area:Pelvis & Bed    Patient was seen today for weekly visit.     Comfort Alteration  KPS:90%  Fatigue: Mild    Nutritional Alteration  Anorexia: No  Nausea: No  Vomiting: No     Elimination Alterations  Constipation: no  Diarrhea:  no  Urinary Frequency/Urgency: No  Urinary Retention: Yes  Dysuria: No  Urinary Incontinence: Yes  Proctitis: No  Nocturia: Yes #/night: 1     Skin Alteration   Sensation:Good    Radiation Dermatitis:  no    Emotional  Coping: effective    Sexuality Alteration  na    Injury, potential bleeding or infection: no    Lab Results   Component Value Date    WBC 3.8 (L) 2024    HGB 14.0 2024    HCT 39.3 2024     2024         Pulse 57   Resp 18   Wt 74.1 kg (163 lb 6.4 oz)   SpO2 99%   BMI 26.37 kg/m²   BP within normal range? no   -if no, manually recheck in 5-10 min  NEW BP readin/89  BP within normal range? yes   -if no, notify attending provider for further instruction      Assessment/Plan:35fx;800/7000cGy completed.    Adelina Rose RN

## 2024-05-24 ENCOUNTER — HOSPITAL ENCOUNTER (OUTPATIENT)
Dept: RADIATION ONCOLOGY | Age: 64
Discharge: HOME | End: 2024-05-24
Payer: OTHER GOVERNMENT

## 2024-05-24 PROCEDURE — 77385 HC NTSTY MODUL RAD TX DLVR SMPL: CPT | Performed by: RADIOLOGY

## 2024-05-24 PROCEDURE — 77336 RADIATION PHYSICS CONSULT: CPT | Performed by: RADIOLOGY

## 2024-05-28 ENCOUNTER — APPOINTMENT (OUTPATIENT)
Dept: RADIATION ONCOLOGY | Age: 64
End: 2024-05-28
Payer: OTHER GOVERNMENT

## 2024-05-28 PROCEDURE — 77014 CHG CT GUIDANCE RADIATION THERAPY FLDS PLACEMENT: CPT | Performed by: RADIOLOGY

## 2024-05-28 PROCEDURE — 77385 HC NTSTY MODUL RAD TX DLVR SMPL: CPT | Performed by: RADIOLOGY

## 2024-05-29 ENCOUNTER — APPOINTMENT (OUTPATIENT)
Dept: RADIATION ONCOLOGY | Age: 64
End: 2024-05-29
Payer: OTHER GOVERNMENT

## 2024-05-29 PROCEDURE — 77385 HC NTSTY MODUL RAD TX DLVR SMPL: CPT | Performed by: RADIOLOGY

## 2024-05-29 PROCEDURE — 77014 CHG CT GUIDANCE RADIATION THERAPY FLDS PLACEMENT: CPT | Performed by: RADIOLOGY

## 2024-05-30 ENCOUNTER — APPOINTMENT (OUTPATIENT)
Dept: RADIATION ONCOLOGY | Age: 64
End: 2024-05-30
Payer: OTHER GOVERNMENT

## 2024-05-30 VITALS
BODY MASS INDEX: 26.47 KG/M2 | TEMPERATURE: 97.3 F | WEIGHT: 164 LBS | RESPIRATION RATE: 18 BRPM | HEART RATE: 51 BPM | OXYGEN SATURATION: 99 %

## 2024-05-30 PROCEDURE — 77014 CHG CT GUIDANCE RADIATION THERAPY FLDS PLACEMENT: CPT | Performed by: RADIOLOGY

## 2024-05-30 PROCEDURE — 77385 HC NTSTY MODUL RAD TX DLVR SMPL: CPT | Performed by: RADIOLOGY

## 2024-05-30 NOTE — PROGRESS NOTES
Radiation Oncology   On Treatment Visit  George Spaulding MD      5/30/2024  Tam Milner  Date of Service:       HPI:Pt getting RT to prostate bed. Doing well. 1600/7000cGy. No sx.           Past Medical History:   Diagnosis Date    Cancer (HCC)     Headache     Hyperlipidemia     Shingles 07/2021    left arm    Trigeminal neuralgia of left side of face 2009         Past Surgical History:   Procedure Laterality Date    LYMPH NODE BIOPSY Left 12/13/2021    EXCISION LEFT AXILLARY LYMPH NODE  **LYMPHOMA PROTOCOL** performed by Bari Alvarenga MD at [facility] OR    THYROIDECTOMY Left 9/12/2022    LEFT COLLINS THYROIDECTOMY performed by Randy Garcia MD at [facility] OR    THYROIDECTOMY Right 2/27/2023    THYROIDECTOMY COMPLETION RIGHT SIDE performed by Randy Garcia MD at [facility] OR    TMJ ARTHROSCOPY      VASCULAR SURGERY Left 11/2019    multivascular decompression of trigeminal nerve         Social History     Socioeconomic History    Marital status:      Spouse name: Not on file    Number of children: 3    Years of education: Not on file    Highest education level: Not on file   Occupational History    Not on file   Tobacco Use    Smoking status: Never    Smokeless tobacco: Never   Vaping Use    Vaping Use: Never used   Substance and Sexual Activity    Alcohol use: Yes     Alcohol/week: 1.0 standard drink of alcohol     Types: 1 Glasses of wine per week     Comment: occasionally    Drug use: Never    Sexual activity: Not on file   Other Topics Concern    Not on file   Social History Narrative    Not on file     Social Determinants of Health     Financial Resource Strain: Not on file   Food Insecurity: Not on file   Transportation Needs: Not on file   Physical Activity: Not on file   Stress: Not on file   Social Connections: Not on file   Intimate Partner Violence: Not on file   Housing Stability: Not on file         Family History   Problem Relation Age of Onset    High Blood Pressure Mother

## 2024-05-30 NOTE — PROGRESS NOTES
Tam Milner  5/30/2024  Wt Readings from Last 3 Encounters:   05/30/24 74.4 kg (164 lb)   05/23/24 74.1 kg (163 lb 6.4 oz)   05/02/24 73.7 kg (162 lb 8 oz)     Body mass index is 26.47 kg/m².        Treatment Area:CTV Pelvis  Bed    Patient was seen today for weekly visit.     Comfort Alteration  KPS:90%  Fatigue: Mild    Nutritional Alteration  Anorexia: No  Nausea: No  Vomiting: No     Elimination Alterations  Constipation: no  Diarrhea:  yes  Urinary Frequency/Urgency: No  Urinary Retention: No  Dysuria: No  Urinary Incontinence: No  Proctitis: No  Nocturia: Yes #/night: 2     Skin Alteration   Sensation:Good    Radiation Dermatitis:  no    Emotional  Coping: effective    Sexuality Alteration  na    Injury, potential bleeding or infection: no    Lab Results   Component Value Date    WBC 3.8 (L) 05/02/2024    HGB 14.0 05/02/2024    HCT 39.3 05/02/2024     05/02/2024         Pulse 51   Temp 97.3 °F (36.3 °C) (Temporal)   Resp 18   Wt 74.4 kg (164 lb)   SpO2 99%   BMI 26.47 kg/m²   BP within normal range? yes     Assessment/Plan:8/35fx;1600/7000cGy completed.    Adelina Rose RN

## 2024-05-31 ENCOUNTER — APPOINTMENT (OUTPATIENT)
Dept: RADIATION ONCOLOGY | Age: 64
End: 2024-05-31
Payer: OTHER GOVERNMENT

## 2024-05-31 PROCEDURE — 77385 HC NTSTY MODUL RAD TX DLVR SMPL: CPT | Performed by: RADIOLOGY

## 2024-06-03 ENCOUNTER — HOSPITAL ENCOUNTER (OUTPATIENT)
Dept: RADIATION ONCOLOGY | Age: 64
Discharge: HOME | End: 2024-06-03
Payer: OTHER GOVERNMENT

## 2024-06-04 ENCOUNTER — HOSPITAL ENCOUNTER (OUTPATIENT)
Dept: RADIATION ONCOLOGY | Age: 64
Discharge: HOME | End: 2024-06-04
Payer: OTHER GOVERNMENT

## 2024-06-04 PROCEDURE — 77385 HC NTSTY MODUL RAD TX DLVR SMPL: CPT | Performed by: RADIOLOGY

## 2024-06-04 PROCEDURE — 77014 CHG CT GUIDANCE RADIATION THERAPY FLDS PLACEMENT: CPT | Performed by: RADIOLOGY

## 2024-06-05 ENCOUNTER — HOSPITAL ENCOUNTER (OUTPATIENT)
Dept: RADIATION ONCOLOGY | Age: 64
Discharge: HOME | End: 2024-06-05
Payer: OTHER GOVERNMENT

## 2024-06-05 PROCEDURE — 77014 CHG CT GUIDANCE RADIATION THERAPY FLDS PLACEMENT: CPT | Performed by: RADIOLOGY

## 2024-06-05 PROCEDURE — 77385 HC NTSTY MODUL RAD TX DLVR SMPL: CPT | Performed by: RADIOLOGY

## 2024-06-06 ENCOUNTER — HOSPITAL ENCOUNTER (OUTPATIENT)
Dept: RADIATION ONCOLOGY | Age: 64
Discharge: HOME | End: 2024-06-06
Payer: OTHER GOVERNMENT

## 2024-06-06 VITALS
TEMPERATURE: 98.2 F | HEART RATE: 56 BPM | DIASTOLIC BLOOD PRESSURE: 82 MMHG | OXYGEN SATURATION: 98 % | BODY MASS INDEX: 26.53 KG/M2 | SYSTOLIC BLOOD PRESSURE: 142 MMHG | WEIGHT: 164.4 LBS | RESPIRATION RATE: 18 BRPM

## 2024-06-06 DIAGNOSIS — C61 PROSTATE CANCER (HCC): Primary | ICD-10-CM

## 2024-06-06 PROCEDURE — 77385 HC NTSTY MODUL RAD TX DLVR SMPL: CPT | Performed by: RADIOLOGY

## 2024-06-06 PROCEDURE — 77014 CHG CT GUIDANCE RADIATION THERAPY FLDS PLACEMENT: CPT | Performed by: RADIOLOGY

## 2024-06-06 NOTE — PROGRESS NOTES
Tam Milner  2024  Wt Readings from Last 3 Encounters:   24 74.6 kg (164 lb 6.4 oz)   24 74.4 kg (164 lb)   24 74.1 kg (163 lb 6.4 oz)     Body mass index is 26.53 kg/m².        Treatment Area:Pelvis & Bed    Patient was seen today for weekly visit.     Comfort Alteration  KPS:90%  Fatigue: Mild    Nutritional Alteration  Anorexia: No  Nausea: No  Vomiting: No     Elimination Alterations  Constipation: no  Diarrhea:  yes  Urinary Frequency/Urgency: Yes  Urinary Retention: No  Dysuria: No  Urinary Incontinence: Yes  Proctitis: Yes, burning  Nocturia: Yes #/night: 4     Skin Alteration   Sensation:Burning     Radiation Dermatitis:  no    Emotional  Coping: effective    Sexuality Alteration  na    Injury, potential bleeding or infection: no    Lab Results   Component Value Date    WBC 3.8 (L) 2024    HGB 14.0 2024    HCT 39.3 2024     2024         BP (!) 142/82   Pulse 56   Temp 98.2 °F (36.8 °C) (Temporal)   Resp 18   Wt 74.6 kg (164 lb 6.4 oz)   SpO2 98%   BMI 26.53 kg/m²   BP within normal range? no   -if no, manually recheck in 5-10 min  NEW BP readin/980  BP within normal range? yes       Assessment/Plan:35fx;2600/7000cGy completed.    Adelina Rose RN

## 2024-06-07 ENCOUNTER — HOSPITAL ENCOUNTER (OUTPATIENT)
Dept: RADIATION ONCOLOGY | Age: 64
Discharge: HOME | End: 2024-06-07
Payer: OTHER GOVERNMENT

## 2024-06-10 ENCOUNTER — HOSPITAL ENCOUNTER (OUTPATIENT)
Dept: RADIATION ONCOLOGY | Age: 64
Discharge: HOME | End: 2024-06-10
Payer: OTHER GOVERNMENT

## 2024-06-11 ENCOUNTER — HOSPITAL ENCOUNTER (OUTPATIENT)
Dept: RADIATION ONCOLOGY | Age: 64
Discharge: HOME | End: 2024-06-11
Payer: OTHER GOVERNMENT

## 2024-06-11 PROCEDURE — 77014 CHG CT GUIDANCE RADIATION THERAPY FLDS PLACEMENT: CPT | Performed by: RADIOLOGY

## 2024-06-11 PROCEDURE — 77385 HC NTSTY MODUL RAD TX DLVR SMPL: CPT | Performed by: RADIOLOGY

## 2024-06-12 ENCOUNTER — HOSPITAL ENCOUNTER (OUTPATIENT)
Dept: RADIATION ONCOLOGY | Age: 64
Discharge: HOME | End: 2024-06-12
Payer: OTHER GOVERNMENT

## 2024-06-12 PROCEDURE — 77385 HC NTSTY MODUL RAD TX DLVR SMPL: CPT | Performed by: RADIOLOGY

## 2024-06-12 NOTE — PROGRESS NOTES
DEPARTMENT OF RADIATION ONCOLOGY ON TREATMENT VISIT         5/23/24      NAME:  Tam Milner    YOB: 1960    Diagnosis: prostate cancer    SUBJECTIVE:   Tam Milner has now received fractionated external beam radiation therapy - ongoing.    Past medical, surgical, social and family histories reviewed and updated as indicated.    Pain: controlled    ALLERGIES:  Cat hair extract         Current Outpatient Medications   Medication Sig Dispense Refill    carBAMazepine (TEGRETOL) 200 MG tablet Take 1 tablet by mouth once      levothyroxine (SYNTHROID) 112 MCG tablet Take 1 tablet by mouth daily Take 1 tablet 6 days a wk and 1.5 tablet on Sunday 95 tablet 3    tadalafil (CIALIS) 5 MG tablet take 1 tablet by mouth once daily      vitamin C (ASCORBIC ACID) 500 MG tablet Take 1 tablet by mouth 2 times daily      magnesium 30 MG tablet Take 25 mg by mouth daily      vitamin E 400 UNIT capsule Take 1 capsule by mouth daily      Zinc 25 MG TABS Take 1 tablet by mouth daily      Multiple Vitamins-Minerals (CENTRUM SILVER 50+MEN) TABS Take by mouth      rosuvastatin (CRESTOR) 5 MG tablet take 1 tablet by mouth once daily      LORazepam (ATIVAN) 0.5 MG tablet lorazepam 0.5 mg tablet      carBAMazepine (CARBATROL) 300 MG extended release capsule Take 1 capsule by mouth 3 times daily Morning and afternoon      levETIRAcetam (KEPPRA) 500 MG tablet Take 1 tablet by mouth 2 times daily Midday and evening      Lacosamide (VIMPAT PO) Take 100 mg by mouth 2 times daily Midday and bedtime       No current facility-administered medications for this encounter.           OBJECTIVE:  Alert and fully ambulatory. Pleasant and conversant.        Physical Examination: General appearance - alert, well appearing, and in no distress.            Wt Readings from Last 3 Encounters:   06/06/24 74.6 kg (164 lb 6.4 oz)   05/30/24 74.4 kg (164 lb)   05/23/24 74.1 kg (163 lb 6.4 oz)         ASSESSMENT/PLAN:     Patient is

## 2024-06-12 NOTE — PATIENT INSTRUCTIONS
Continue daily fractionated radiation therapy as scheduled. Please see weekly OTV note and intial consultation letter in EPIC for clinical details.        Richie Payton III, MD MS DABR    [place]:  [phone]   FAX: [phone]  [facility]:  [phone]   FAX:    [phone]  [place]:  [phone]   FAX:  [phone]  Email: [email or web address]

## 2024-06-12 NOTE — PROGRESS NOTES
DEPARTMENT OF RADIATION ONCOLOGY ON TREATMENT VISIT         6/12/2024      NAME:  Tam Milner    YOB: 1960    Diagnosis: prostate cancer    SUBJECTIVE:   Tam Milner has now received fractionated external beam radiation therapy - ongoing.    Past medical, surgical, social and family histories reviewed and updated as indicated.    Pain: controlled    ALLERGIES:  Cat hair extract         Current Outpatient Medications   Medication Sig Dispense Refill    carBAMazepine (TEGRETOL) 200 MG tablet Take 1 tablet by mouth once      levothyroxine (SYNTHROID) 112 MCG tablet Take 1 tablet by mouth daily Take 1 tablet 6 days a wk and 1.5 tablet on Sunday 95 tablet 3    tadalafil (CIALIS) 5 MG tablet take 1 tablet by mouth once daily      vitamin C (ASCORBIC ACID) 500 MG tablet Take 1 tablet by mouth 2 times daily      magnesium 30 MG tablet Take 25 mg by mouth daily      vitamin E 400 UNIT capsule Take 1 capsule by mouth daily      Zinc 25 MG TABS Take 1 tablet by mouth daily      Multiple Vitamins-Minerals (CENTRUM SILVER 50+MEN) TABS Take by mouth      rosuvastatin (CRESTOR) 5 MG tablet take 1 tablet by mouth once daily      LORazepam (ATIVAN) 0.5 MG tablet lorazepam 0.5 mg tablet      carBAMazepine (CARBATROL) 300 MG extended release capsule Take 1 capsule by mouth 3 times daily Morning and afternoon      levETIRAcetam (KEPPRA) 500 MG tablet Take 1 tablet by mouth 2 times daily Midday and evening      Lacosamide (VIMPAT PO) Take 100 mg by mouth 2 times daily Midday and bedtime       No current facility-administered medications for this encounter.           OBJECTIVE:  Alert and fully ambulatory. Pleasant and conversant.        Physical Examination: General appearance - alert, well appearing, and in no distress.            Wt Readings from Last 3 Encounters:   06/06/24 74.6 kg (164 lb 6.4 oz)   05/30/24 74.4 kg (164 lb)   05/23/24 74.1 kg (163 lb 6.4 oz)         ASSESSMENT/PLAN:     Patient is

## 2024-06-13 ENCOUNTER — HOSPITAL ENCOUNTER (OUTPATIENT)
Dept: RADIATION ONCOLOGY | Age: 64
Discharge: HOME | End: 2024-06-13
Payer: OTHER GOVERNMENT

## 2024-06-13 VITALS
TEMPERATURE: 97.6 F | WEIGHT: 160.6 LBS | OXYGEN SATURATION: 97 % | BODY MASS INDEX: 25.92 KG/M2 | DIASTOLIC BLOOD PRESSURE: 76 MMHG | HEART RATE: 71 BPM | SYSTOLIC BLOOD PRESSURE: 142 MMHG | RESPIRATION RATE: 18 BRPM

## 2024-06-13 DIAGNOSIS — C61 PROSTATE CANCER (HCC): Primary | ICD-10-CM

## 2024-06-13 PROCEDURE — 77014 CHG CT GUIDANCE RADIATION THERAPY FLDS PLACEMENT: CPT | Performed by: RADIOLOGY

## 2024-06-13 PROCEDURE — 77385 HC NTSTY MODUL RAD TX DLVR SMPL: CPT | Performed by: RADIOLOGY

## 2024-06-13 NOTE — PROGRESS NOTES
Tam Milner  6/13/2024  Wt Readings from Last 3 Encounters:   06/13/24 72.8 kg (160 lb 9.6 oz)   06/06/24 74.6 kg (164 lb 6.4 oz)   05/30/24 74.4 kg (164 lb)     Body mass index is 25.92 kg/m².        Treatment Area:CTV pelvis     Patient was seen today for weekly visit.     Comfort Alteration  KPS:90%  Fatigue: Mild    Nutritional Alteration  Anorexia: No  Nausea: No  Vomiting: No     Elimination Alterations  Constipation: no  Diarrhea:  yes  Urinary Frequency/Urgency: yes  Urinary Retention: No  Dysuria: No  Urinary Incontinence: No  Proctitis: No  Nocturia: yes#/night: 2     Skin Alteration   Sensation:no    Radiation Dermatitis:  no    Emotional  Coping: effective    Sexuality Alteration  na    Injury, potential bleeding or infection: no    Lab Results   Component Value Date    WBC 3.8 (L) 05/02/2024    HGB 14.0 05/02/2024    HCT 39.3 05/02/2024     05/02/2024         BP (!) 142/76   Pulse 71   Temp 97.6 °F (36.4 °C) (Temporal)   Resp 18   Wt 72.8 kg (160 lb 9.6 oz)   SpO2 97%   BMI 25.92 kg/m²   BP within normal range? no   -if no, manually recheck in 5-10 min  NEW BP reading:  BP within normal range? yes   -if no, notify attending provider for further instruction      Assessment/Plan: Pt completed 18/35fx and 3600/7000cGy.    Kristi Ornelas RN

## 2024-06-14 ENCOUNTER — HOSPITAL ENCOUNTER (OUTPATIENT)
Dept: RADIATION ONCOLOGY | Age: 64
Discharge: HOME | End: 2024-06-14
Payer: OTHER GOVERNMENT

## 2024-06-14 PROCEDURE — 77385 HC NTSTY MODUL RAD TX DLVR SMPL: CPT | Performed by: RADIOLOGY

## 2024-06-17 ENCOUNTER — HOSPITAL ENCOUNTER (OUTPATIENT)
Dept: RADIATION ONCOLOGY | Age: 64
Discharge: HOME | End: 2024-06-17
Payer: OTHER GOVERNMENT

## 2024-06-17 PROCEDURE — 77014 CHG CT GUIDANCE RADIATION THERAPY FLDS PLACEMENT: CPT | Performed by: RADIOLOGY

## 2024-06-17 PROCEDURE — 77385 HC NTSTY MODUL RAD TX DLVR SMPL: CPT | Performed by: RADIOLOGY

## 2024-06-17 PROCEDURE — 77336 RADIATION PHYSICS CONSULT: CPT | Performed by: RADIOLOGY

## 2024-06-18 ENCOUNTER — HOSPITAL ENCOUNTER (OUTPATIENT)
Dept: RADIATION ONCOLOGY | Age: 64
Discharge: HOME | End: 2024-06-18
Payer: OTHER GOVERNMENT

## 2024-06-18 PROCEDURE — 77385 HC NTSTY MODUL RAD TX DLVR SMPL: CPT | Performed by: RADIOLOGY

## 2024-06-19 ENCOUNTER — HOSPITAL ENCOUNTER (OUTPATIENT)
Dept: RADIATION ONCOLOGY | Age: 64
Discharge: HOME | End: 2024-06-19
Payer: OTHER GOVERNMENT

## 2024-06-19 PROCEDURE — 77385 HC NTSTY MODUL RAD TX DLVR SMPL: CPT | Performed by: RADIOLOGY

## 2024-06-19 NOTE — PATIENT INSTRUCTIONS
Continue daily fractionated radiation therapy as scheduled. Please see weekly OTV note and intial consultation letter in EPIC for clinical details.        Richie Payton III, MD MS DABR    [place]:  [phone]   FAX: [phone]  [facility]:  [phone]   FAX:    [phone]  [place]:  [phone]   FAX:  [phone]  Email: [email or web address]

## 2024-06-19 NOTE — PROGRESS NOTES
DEPARTMENT OF RADIATION ONCOLOGY ON TREATMENT VISIT         6/19/2024      NAME:  Tam Milner    YOB: 1960    Diagnosis: prostate cancer    SUBJECTIVE:   Tam Milner has now received fractionated external beam radiation therapy - ongoing.    Past medical, surgical, social and family histories reviewed and updated as indicated.    Pain: controlled    ALLERGIES:  Cat hair extract         Current Outpatient Medications   Medication Sig Dispense Refill    carBAMazepine (TEGRETOL) 200 MG tablet Take 1 tablet by mouth once      levothyroxine (SYNTHROID) 112 MCG tablet Take 1 tablet by mouth daily Take 1 tablet 6 days a wk and 1.5 tablet on Sunday 95 tablet 3    tadalafil (CIALIS) 5 MG tablet take 1 tablet by mouth once daily      vitamin C (ASCORBIC ACID) 500 MG tablet Take 1 tablet by mouth 2 times daily      magnesium 30 MG tablet Take 25 mg by mouth daily      vitamin E 400 UNIT capsule Take 1 capsule by mouth daily      Zinc 25 MG TABS Take 1 tablet by mouth daily      Multiple Vitamins-Minerals (CENTRUM SILVER 50+MEN) TABS Take by mouth      rosuvastatin (CRESTOR) 5 MG tablet take 1 tablet by mouth once daily      LORazepam (ATIVAN) 0.5 MG tablet lorazepam 0.5 mg tablet      carBAMazepine (CARBATROL) 300 MG extended release capsule Take 1 capsule by mouth 3 times daily Morning and afternoon      levETIRAcetam (KEPPRA) 500 MG tablet Take 1 tablet by mouth 2 times daily Midday and evening      Lacosamide (VIMPAT PO) Take 100 mg by mouth 2 times daily Midday and bedtime       No current facility-administered medications for this encounter.           OBJECTIVE:  Alert and fully ambulatory. Pleasant and conversant.      Physical Examination: General appearance - alert, well appearing, and in no distress.          Wt Readings from Last 3 Encounters:   06/13/24 72.8 kg (160 lb 9.6 oz)   06/06/24 74.6 kg (164 lb 6.4 oz)   05/30/24 74.4 kg (164 lb)         ASSESSMENT/PLAN:     Patient is

## 2024-06-20 ENCOUNTER — HOSPITAL ENCOUNTER (OUTPATIENT)
Dept: RADIATION ONCOLOGY | Age: 64
Discharge: HOME | End: 2024-06-20
Payer: OTHER GOVERNMENT

## 2024-06-20 VITALS
DIASTOLIC BLOOD PRESSURE: 85 MMHG | HEART RATE: 55 BPM | WEIGHT: 159.8 LBS | BODY MASS INDEX: 25.79 KG/M2 | TEMPERATURE: 97.3 F | RESPIRATION RATE: 18 BRPM | SYSTOLIC BLOOD PRESSURE: 138 MMHG

## 2024-06-20 DIAGNOSIS — C61 PROSTATE CANCER (HCC): Primary | ICD-10-CM

## 2024-06-20 PROCEDURE — 77385 HC NTSTY MODUL RAD TX DLVR SMPL: CPT | Performed by: RADIOLOGY

## 2024-06-20 PROCEDURE — NBSRV NON-BILLABLE SERVICE: Performed by: RADIOLOGY

## 2024-06-20 NOTE — PROGRESS NOTES
Tam Milner  6/20/2024  Wt Readings from Last 3 Encounters:   06/20/24 72.5 kg (159 lb 12.8 oz)   06/13/24 72.8 kg (160 lb 9.6 oz)   06/06/24 74.6 kg (164 lb 6.4 oz)     Body mass index is 25.79 kg/m².        Treatment Area:CTV pelvis    Patient was seen today for weekly visit.     Comfort Alteration  KPS:90%  Fatigue: Mild    Nutritional Alteration  Anorexia: No  Nausea: No  Vomiting: No     Elimination Alterations  Constipation: no  Diarrhea:  yes, states loose not liquid   Urinary Frequency/Urgency: Yes  Urinary Retention: No  Dysuria: No  Urinary Incontinence: No  Proctitis: No  Nocturia: Yes #/night: 4 times in 6 hours     Skin Alteration   Sensation: discomfort    Radiation Dermatitis:  states skin irritation to gluteal lubna and an area to his penis.  I gave him Xeroform dressing and 4 x 4 to take home and instructions how to use it.     Emotional  Coping: effective    Sexuality Alteration  na    Injury, potential bleeding or infection: potential    Lab Results   Component Value Date    WBC 3.8 (L) 05/02/2024    HGB 14.0 05/02/2024    HCT 39.3 05/02/2024     05/02/2024         /85   Pulse 55   Temp 97.3 °F (36.3 °C) (Temporal)   Resp 18   Wt 72.5 kg (159 lb 12.8 oz)   BMI 25.79 kg/m²   BP within normal range? yes       Assessment/Plan: Completed 23/35 fractions; 4600/7000 cGy    Nae Quevedo RN

## 2024-06-20 NOTE — PROGRESS NOTES
DEPARTMENT OF RADIATION ONCOLOGY ON TREATMENT VISIT         6/20/2024      NAME:  Tam Milner    YOB: 1960    Diagnosis: prostate cancer    SUBJECTIVE:   Tam Milner has now received fractionated external beam radiation therapy - ongoing.    Past medical, surgical, social and family histories reviewed and updated as indicated.    Pain: controlled    ALLERGIES:  Cat hair extract         Current Outpatient Medications   Medication Sig Dispense Refill    carBAMazepine (TEGRETOL) 200 MG tablet Take 1 tablet by mouth once      levothyroxine (SYNTHROID) 112 MCG tablet Take 1 tablet by mouth daily Take 1 tablet 6 days a wk and 1.5 tablet on Sunday 95 tablet 3    tadalafil (CIALIS) 5 MG tablet take 1 tablet by mouth once daily      vitamin C (ASCORBIC ACID) 500 MG tablet Take 1 tablet by mouth 2 times daily      magnesium 30 MG tablet Take 25 mg by mouth daily      vitamin E 400 UNIT capsule Take 1 capsule by mouth daily      Zinc 25 MG TABS Take 1 tablet by mouth daily      Multiple Vitamins-Minerals (CENTRUM SILVER 50+MEN) TABS Take by mouth      rosuvastatin (CRESTOR) 5 MG tablet take 1 tablet by mouth once daily      LORazepam (ATIVAN) 0.5 MG tablet lorazepam 0.5 mg tablet      carBAMazepine (CARBATROL) 300 MG extended release capsule Take 1 capsule by mouth 3 times daily Morning and afternoon      levETIRAcetam (KEPPRA) 500 MG tablet Take 1 tablet by mouth 2 times daily Midday and evening      Lacosamide (VIMPAT PO) Take 100 mg by mouth 2 times daily Midday and bedtime       No current facility-administered medications for this encounter.           OBJECTIVE:  Alert and fully ambulatory. Pleasant and conversant.        Physical Examination: General appearance - alert, well appearing, and in no distress.            Wt Readings from Last 3 Encounters:   06/20/24 72.5 kg (159 lb 12.8 oz)   06/13/24 72.8 kg (160 lb 9.6 oz)   06/06/24 74.6 kg (164 lb 6.4 oz)         ASSESSMENT/PLAN:

## 2024-06-20 NOTE — PATIENT INSTRUCTIONS
Continue daily fractionated radiation therapy as scheduled. Please see weekly OTV note and intial consultation letter in EPIC for clinical details.        Richie Payton III, MD MS DABR    [place]:  [phone]   FAX: [phone]  [facility]:  [phone]   FAX:    [phone]  [place]:  [phone]   FAX:  [phone]  Email: [email or web address]

## 2024-06-21 ENCOUNTER — HOSPITAL ENCOUNTER (OUTPATIENT)
Dept: RADIATION ONCOLOGY | Age: 64
Discharge: HOME | End: 2024-06-21
Payer: OTHER GOVERNMENT

## 2024-06-21 PROCEDURE — 77385 HC NTSTY MODUL RAD TX DLVR SMPL: CPT | Performed by: RADIOLOGY

## 2024-06-24 ENCOUNTER — HOSPITAL ENCOUNTER (OUTPATIENT)
Dept: RADIATION ONCOLOGY | Age: 64
Discharge: HOME | End: 2024-06-24
Payer: OTHER GOVERNMENT

## 2024-06-24 PROCEDURE — 77427 RADIATION TX MANAGEMENT X5: CPT | Performed by: RADIOLOGY

## 2024-06-24 PROCEDURE — 77014 CHG CT GUIDANCE RADIATION THERAPY FLDS PLACEMENT: CPT | Performed by: RADIOLOGY

## 2024-06-24 PROCEDURE — 77385 HC NTSTY MODUL RAD TX DLVR SMPL: CPT | Performed by: RADIOLOGY

## 2024-06-24 PROCEDURE — 77336 RADIATION PHYSICS CONSULT: CPT | Performed by: RADIOLOGY

## 2024-06-25 ENCOUNTER — HOSPITAL ENCOUNTER (OUTPATIENT)
Dept: RADIATION ONCOLOGY | Age: 64
Discharge: HOME | End: 2024-06-25
Payer: OTHER GOVERNMENT

## 2024-06-25 PROCEDURE — 77014 CHG CT GUIDANCE RADIATION THERAPY FLDS PLACEMENT: CPT | Performed by: RADIOLOGY

## 2024-06-25 PROCEDURE — 77385 HC NTSTY MODUL RAD TX DLVR SMPL: CPT | Performed by: RADIOLOGY

## 2024-06-26 ENCOUNTER — HOSPITAL ENCOUNTER (OUTPATIENT)
Dept: INFUSION THERAPY | Age: 64
Discharge: HOME | End: 2024-06-26
Payer: OTHER GOVERNMENT

## 2024-06-26 ENCOUNTER — HOSPITAL ENCOUNTER (OUTPATIENT)
Dept: RADIATION ONCOLOGY | Age: 64
Discharge: HOME | End: 2024-06-26
Payer: OTHER GOVERNMENT

## 2024-06-26 DIAGNOSIS — C82.94 FOLLICULAR LYMPHOMA OF LYMPH NODES OF UPPER EXTREMITY, UNSPECIFIED GRADE (HCC): ICD-10-CM

## 2024-06-26 LAB
ALBUMIN SERPL-MCNC: 4.4 G/DL (ref 3.5–5.2)
ALP SERPL-CCNC: 79 U/L (ref 40–129)
ALT SERPL-CCNC: 15 U/L (ref 0–40)
ANION GAP SERPL CALCULATED.3IONS-SCNC: 9 MMOL/L (ref 7–16)
AST SERPL-CCNC: 22 U/L (ref 0–39)
BASOPHILS # BLD: 0.03 K/UL (ref 0–0.2)
BASOPHILS NFR BLD: 1 % (ref 0–2)
BILIRUB SERPL-MCNC: 0.2 MG/DL (ref 0–1.2)
BUN SERPL-MCNC: 15 MG/DL (ref 6–23)
CALCIUM SERPL-MCNC: 9.1 MG/DL (ref 8.6–10.2)
CHLORIDE SERPL-SCNC: 100 MMOL/L (ref 98–107)
CO2 SERPL-SCNC: 27 MMOL/L (ref 22–29)
CREAT SERPL-MCNC: 0.8 MG/DL (ref 0.7–1.2)
EOSINOPHIL # BLD: 0.27 K/UL (ref 0.05–0.5)
EOSINOPHILS RELATIVE PERCENT: 7 % (ref 0–6)
ERYTHROCYTE [DISTWIDTH] IN BLOOD BY AUTOMATED COUNT: 12.6 % (ref 11.5–15)
GFR, ESTIMATED: >90 ML/MIN/1.73M2
GLUCOSE SERPL-MCNC: 96 MG/DL (ref 74–99)
HCT VFR BLD AUTO: 35.2 % (ref 37–54)
HGB BLD-MCNC: 12.5 G/DL (ref 12.5–16.5)
LDH SERPL-CCNC: 188 U/L (ref 135–225)
LYMPHOCYTES NFR BLD: 0.41 K/UL (ref 1.5–4)
LYMPHOCYTES RELATIVE PERCENT: 10 % (ref 20–42)
MAGNESIUM SERPL-MCNC: 2 MG/DL (ref 1.6–2.6)
MCH RBC QN AUTO: 32.9 PG (ref 26–35)
MCHC RBC AUTO-ENTMCNC: 35.5 G/DL (ref 32–34.5)
MCV RBC AUTO: 92.6 FL (ref 80–99.9)
MONOCYTES NFR BLD: 0.52 K/UL (ref 0.1–0.95)
MONOCYTES NFR BLD: 13 % (ref 2–12)
NEUTROPHILS NFR BLD: 69 % (ref 43–80)
NEUTS SEG NFR BLD: 2.72 K/UL (ref 1.8–7.3)
PLATELET # BLD AUTO: 208 K/UL (ref 130–450)
PMV BLD AUTO: 9.4 FL (ref 7–12)
POTASSIUM SERPL-SCNC: 4.2 MMOL/L (ref 3.5–5)
PROT SERPL-MCNC: 7 G/DL (ref 6.4–8.3)
RBC # BLD AUTO: 3.8 M/UL (ref 3.8–5.8)
SODIUM SERPL-SCNC: 136 MMOL/L (ref 132–146)
WBC OTHER # BLD: 4 K/UL (ref 4.5–11.5)

## 2024-06-26 PROCEDURE — 83735 ASSAY OF MAGNESIUM: CPT

## 2024-06-26 PROCEDURE — 36415 COLL VENOUS BLD VENIPUNCTURE: CPT

## 2024-06-26 PROCEDURE — 80053 COMPREHEN METABOLIC PANEL: CPT

## 2024-06-26 PROCEDURE — 77385 HC NTSTY MODUL RAD TX DLVR SMPL: CPT | Performed by: RADIOLOGY

## 2024-06-26 PROCEDURE — 83615 LACTATE (LD) (LDH) ENZYME: CPT

## 2024-06-26 PROCEDURE — 77014 CHG CT GUIDANCE RADIATION THERAPY FLDS PLACEMENT: CPT | Performed by: RADIOLOGY

## 2024-06-26 PROCEDURE — 85025 COMPLETE CBC W/AUTO DIFF WBC: CPT

## 2024-06-27 ENCOUNTER — OFFICE VISIT (OUTPATIENT)
Dept: ONCOLOGY | Age: 64
End: 2024-06-27
Payer: OTHER GOVERNMENT

## 2024-06-27 ENCOUNTER — HOSPITAL ENCOUNTER (OUTPATIENT)
Dept: RADIATION ONCOLOGY | Age: 64
Discharge: HOME | End: 2024-06-27
Payer: OTHER GOVERNMENT

## 2024-06-27 ENCOUNTER — HOSPITAL ENCOUNTER (OUTPATIENT)
Dept: INFUSION THERAPY | Age: 64
Discharge: HOME | End: 2024-06-27
Payer: OTHER GOVERNMENT

## 2024-06-27 VITALS
WEIGHT: 159.6 LBS | RESPIRATION RATE: 18 BRPM | DIASTOLIC BLOOD PRESSURE: 88 MMHG | SYSTOLIC BLOOD PRESSURE: 134 MMHG | OXYGEN SATURATION: 99 % | TEMPERATURE: 97.1 F | BODY MASS INDEX: 25.76 KG/M2 | HEART RATE: 54 BPM

## 2024-06-27 VITALS
TEMPERATURE: 96.8 F | DIASTOLIC BLOOD PRESSURE: 81 MMHG | WEIGHT: 160.7 LBS | HEIGHT: 66 IN | BODY MASS INDEX: 25.83 KG/M2 | HEART RATE: 57 BPM | SYSTOLIC BLOOD PRESSURE: 132 MMHG | OXYGEN SATURATION: 100 %

## 2024-06-27 DIAGNOSIS — C61 PROSTATE CANCER (HCC): Primary | ICD-10-CM

## 2024-06-27 DIAGNOSIS — C73 THYROID CANCER (HCC): ICD-10-CM

## 2024-06-27 DIAGNOSIS — C82.94 FOLLICULAR LYMPHOMA OF LYMPH NODES OF UPPER EXTREMITY, UNSPECIFIED GRADE (HCC): ICD-10-CM

## 2024-06-27 PROCEDURE — 99212 OFFICE O/P EST SF 10 MIN: CPT

## 2024-06-27 PROCEDURE — 99214 OFFICE O/P EST MOD 30 MIN: CPT | Performed by: INTERNAL MEDICINE

## 2024-06-27 PROCEDURE — 96402 CHEMO HORMON ANTINEOPL SQ/IM: CPT

## 2024-06-27 PROCEDURE — 77385 HC NTSTY MODUL RAD TX DLVR SMPL: CPT | Performed by: RADIOLOGY

## 2024-06-27 PROCEDURE — 6360000002 HC RX W HCPCS: Performed by: INTERNAL MEDICINE

## 2024-06-27 RX ORDER — ALBUTEROL SULFATE 90 UG/1
4 AEROSOL, METERED RESPIRATORY (INHALATION) PRN
OUTPATIENT
Start: 2024-09-19

## 2024-06-27 RX ORDER — ACETAMINOPHEN 325 MG/1
650 TABLET ORAL
OUTPATIENT
Start: 2024-09-19

## 2024-06-27 RX ORDER — FAMOTIDINE 10 MG/ML
20 INJECTION, SOLUTION INTRAVENOUS
OUTPATIENT
Start: 2024-09-19

## 2024-06-27 RX ORDER — EPINEPHRINE 1 MG/ML
0.3 INJECTION, SOLUTION, CONCENTRATE INTRAVENOUS PRN
OUTPATIENT
Start: 2024-09-19

## 2024-06-27 RX ORDER — ONDANSETRON 2 MG/ML
8 INJECTION INTRAMUSCULAR; INTRAVENOUS
OUTPATIENT
Start: 2024-09-19

## 2024-06-27 RX ORDER — SODIUM CHLORIDE 9 MG/ML
INJECTION, SOLUTION INTRAVENOUS CONTINUOUS
OUTPATIENT
Start: 2024-09-19

## 2024-06-27 RX ORDER — DIPHENHYDRAMINE HYDROCHLORIDE 50 MG/ML
50 INJECTION INTRAMUSCULAR; INTRAVENOUS
OUTPATIENT
Start: 2024-09-19

## 2024-06-27 RX ADMIN — LEUPROLIDE ACETATE 22.5 MG: KIT at 09:06

## 2024-06-27 ASSESSMENT — PAIN SCALES - GENERAL: PAINLEVEL_OUTOF10: 3

## 2024-06-27 ASSESSMENT — PAIN DESCRIPTION - ORIENTATION: ORIENTATION: MID

## 2024-06-27 ASSESSMENT — PAIN DESCRIPTION - FREQUENCY: FREQUENCY: CONTINUOUS

## 2024-06-27 NOTE — PROGRESS NOTES
lymphadenopathy, with low level of activity, asymmetric focal increased areas of mild metabolic activity within the marrow space concerning for extranodal disease, the most significant activity is in the L5 vertebral body, focal intense activity associated with a left thyroid nodule.    The patient has stage IV follicular lymphoma, he does not have B symptoms, no bulky disease, labs reviewed, he has worsening lymphocytopenia, treatment-related, no anemia or thrombocytopenia, will monitor his counts.  No indication for treatment of the follicular lymphoma.    Thyroid papillary carcinoma, s/p left hemithyroidectomy with Dr. Garcia on September 12, 2022.  Surgical pathology report: Left thyroid, left hemithyroidectomy: Tumor size: 1.3 cm in greatest dimension. Papillary carcinoma, follicular variant, infiltrative; Carcinoma is present at an inked peripheral margin. Lymphocytic thyroiditis. ENT recommended for completion thyroidectomy as pathology revealed 1.3 cm size, + superior margin, no extrathyroidal extension.  The patient has positive margins, he had completed the treatment, there was residual carcinoma, on 5/5/2023 he received radioactive iodine treatment.  Thyroglobulin antibody is less than 0.9, thyroglobulin less than 0.1, TSH 3.23, T41.1, most recent TSH 1.89, continue follow-up with Dr. Crook.    Elevated PSA, the patient was diagnosed with prostate cancer, he had a prostatectomy done at [facility] by Dr. French on 7/10/2023, path:  A.  Prostate gland, radical prostatectomy:  - Prostatic adenocarcinoma, [place] score 4+5=9 (Grade Group 5) (see synoptic report).  - Positive for extraprostatic extension (pT3a).  - Surgical margins free of tumor.     B.  Lymph nodes, bilateral pelvic, dissection:  - Six lymph nodes with no evidence of metastatic disease (0/6) (see comment).     PSA had increased to 0.06 on 1/15/2024, 0.08 on 3/7/2024    The patient was recommended RT with a short course of ADT,   was started on

## 2024-06-27 NOTE — PROGRESS NOTES
DEPARTMENT OF RADIATION ONCOLOGY ON TREATMENT VISIT         6/27/2024      NAME:  Tam Milner    YOB: 1960    Diagnosis: Post prostatectomy salvage radiation    SUBJECTIVE:   Tam Milner has now received 5600 cGy in 28 fractions directed to the prostate bed and pelvis.  On neoadjuvant hormonal therapy.  Nocturia x 4-5.  Was 1-2 prior to the radiation.  No dysuria.  Patient wants no Ditropan etc.  as near the end.  Bowels a little loose control with diet.  No blood.          Past medical, surgical, social and family histories reviewed and updated as indicated.    Pain: None    ALLERGIES:  Cat hair extract         Current Outpatient Medications   Medication Sig Dispense Refill    carBAMazepine (TEGRETOL) 200 MG tablet Take 1 tablet by mouth once      levothyroxine (SYNTHROID) 112 MCG tablet Take 1 tablet by mouth daily Take 1 tablet 6 days a wk and 1.5 tablet on Sunday 95 tablet 3    tadalafil (CIALIS) 5 MG tablet take 1 tablet by mouth once daily      vitamin C (ASCORBIC ACID) 500 MG tablet Take 1 tablet by mouth 2 times daily      magnesium 30 MG tablet Take 25 mg by mouth daily      vitamin E 400 UNIT capsule Take 1 capsule by mouth daily      Zinc 25 MG TABS Take 1 tablet by mouth daily      Multiple Vitamins-Minerals (CENTRUM SILVER 50+MEN) TABS Take by mouth      rosuvastatin (CRESTOR) 5 MG tablet take 1 tablet by mouth once daily      LORazepam (ATIVAN) 0.5 MG tablet lorazepam 0.5 mg tablet      carBAMazepine (CARBATROL) 300 MG extended release capsule Take 1 capsule by mouth 3 times daily Morning and afternoon      levETIRAcetam (KEPPRA) 500 MG tablet Take 1 tablet by mouth 2 times daily Midday and evening      Lacosamide (VIMPAT PO) Take 100 mg by mouth 2 times daily Midday and bedtime       No current facility-administered medications for this encounter.           OBJECTIVE:  Alert and fully ambulatory. Pleasant and conversant.      Physical Examination: No acute

## 2024-06-27 NOTE — PROGRESS NOTES
Tam Milner  6/27/2024  Wt Readings from Last 3 Encounters:   06/27/24 72.4 kg (159 lb 9.6 oz)   06/27/24 72.9 kg (160 lb 11.2 oz)   06/20/24 72.5 kg (159 lb 12.8 oz)     Body mass index is 25.76 kg/m².        Treatment Area:Pelvis & Bed    Patient was seen today for weekly visit.     Comfort Alteration  KPS:90%  Fatigue: Mild    Nutritional Alteration  Anorexia: No  Nausea: No  Vomiting: No     Elimination Alterations  Constipation: no  Diarrhea:  yes  Urinary Frequency/Urgency: Yes  Urinary Retention: Yes  Dysuria: No  Urinary Incontinence: Yes  Proctitis: Yes, sore  Nocturia: Yes #/night: 5     Skin Alteration   Sensation:Good    Radiation Dermatitis:  no    Emotional  Coping: effective    Sexuality Alteration  na    Injury, potential bleeding or infection: no    Lab Results   Component Value Date    WBC 4.0 (L) 06/26/2024    HGB 12.5 06/26/2024    HCT 35.2 (L) 06/26/2024     06/26/2024         /88   Pulse 54   Temp 97.1 °F (36.2 °C) (Temporal)   Resp 18   Wt 72.4 kg (159 lb 9.6 oz)   SpO2 99%   BMI 25.76 kg/m²   BP within normal range? yes       Assessment/Plan:28/35fx;5600/7000cGy completed.    Adelina Rose RN

## 2024-06-28 ENCOUNTER — HOSPITAL ENCOUNTER (OUTPATIENT)
Dept: RADIATION ONCOLOGY | Age: 64
Discharge: HOME | End: 2024-06-28
Payer: OTHER GOVERNMENT

## 2024-06-28 PROCEDURE — 77385 HC NTSTY MODUL RAD TX DLVR SMPL: CPT | Performed by: RADIOLOGY

## 2024-06-30 ENCOUNTER — HOSPITAL ENCOUNTER (OUTPATIENT)
Dept: RADIATION ONCOLOGY | Age: 64
Discharge: HOME | End: 2024-06-30
Payer: OTHER GOVERNMENT

## 2024-06-30 PROCEDURE — 77385 HC NTSTY MODUL RAD TX DLVR SMPL: CPT | Performed by: RADIOLOGY

## 2024-06-30 PROCEDURE — 77336 RADIATION PHYSICS CONSULT: CPT | Performed by: RADIOLOGY

## 2024-07-01 ENCOUNTER — HOSPITAL ENCOUNTER (OUTPATIENT)
Dept: RADIATION ONCOLOGY | Age: 64
Discharge: HOME | End: 2024-07-01
Payer: OTHER GOVERNMENT

## 2024-07-01 PROCEDURE — 77014 CHG CT GUIDANCE RADIATION THERAPY FLDS PLACEMENT: CPT | Performed by: RADIOLOGY

## 2024-07-01 PROCEDURE — 77385 HC NTSTY MODUL RAD TX DLVR SMPL: CPT | Performed by: RADIOLOGY

## 2024-07-02 ENCOUNTER — HOSPITAL ENCOUNTER (OUTPATIENT)
Dept: RADIATION ONCOLOGY | Age: 64
Discharge: HOME | End: 2024-07-02
Payer: OTHER GOVERNMENT

## 2024-07-02 VITALS
TEMPERATURE: 97.4 F | BODY MASS INDEX: 25.66 KG/M2 | RESPIRATION RATE: 18 BRPM | OXYGEN SATURATION: 99 % | WEIGHT: 159 LBS | DIASTOLIC BLOOD PRESSURE: 83 MMHG | HEART RATE: 55 BPM | SYSTOLIC BLOOD PRESSURE: 132 MMHG

## 2024-07-02 DIAGNOSIS — C61 PROSTATE CANCER (HCC): Primary | ICD-10-CM

## 2024-07-02 PROCEDURE — NBSRV NON-BILLABLE SERVICE: Performed by: RADIOLOGY

## 2024-07-02 PROCEDURE — 77385 HC NTSTY MODUL RAD TX DLVR SMPL: CPT | Performed by: RADIOLOGY

## 2024-07-02 PROCEDURE — 77014 CHG CT GUIDANCE RADIATION THERAPY FLDS PLACEMENT: CPT | Performed by: RADIOLOGY

## 2024-07-02 ASSESSMENT — PAIN SCALES - GENERAL: PAINLEVEL_OUTOF10: 7

## 2024-07-02 ASSESSMENT — PAIN DESCRIPTION - FREQUENCY: FREQUENCY: CONTINUOUS

## 2024-07-02 ASSESSMENT — PAIN DESCRIPTION - DESCRIPTORS: DESCRIPTORS: ACHING

## 2024-07-02 ASSESSMENT — PAIN DESCRIPTION - PAIN TYPE: TYPE: CHRONIC PAIN

## 2024-07-02 ASSESSMENT — PAIN DESCRIPTION - LOCATION: LOCATION: JAW

## 2024-07-02 NOTE — PATIENT INSTRUCTIONS
Continue daily fractionated radiation therapy as scheduled. Please see weekly OTV note and intial consultation letter in EPIC for clinical details.        Richie Payton III, MD MS DABR    [place]:  [phone]   FAX: [phone]  [facility]:  [phone]   FAX:    [phone]  [place]:  [phone]   FAX:  [phone]  Email: [email or web address]

## 2024-07-02 NOTE — PROGRESS NOTES
DEPARTMENT OF RADIATION ONCOLOGY ON TREATMENT VISIT         7/2/2024      NAME:  Tam Milner    YOB: 1960    Diagnosis: prostate cancer    SUBJECTIVE:   Tam Milner has now received fractionated external beam radiation therapy - ongoing.    Past medical, surgical, social and family histories reviewed and updated as indicated.    Pain: controlled    ALLERGIES:  Cat hair extract         Current Outpatient Medications   Medication Sig Dispense Refill    carBAMazepine (TEGRETOL) 200 MG tablet Take 1 tablet by mouth once      levothyroxine (SYNTHROID) 112 MCG tablet Take 1 tablet by mouth daily Take 1 tablet 6 days a wk and 1.5 tablet on Sunday 95 tablet 3    tadalafil (CIALIS) 5 MG tablet take 1 tablet by mouth once daily      vitamin C (ASCORBIC ACID) 500 MG tablet Take 1 tablet by mouth 2 times daily      magnesium 30 MG tablet Take 25 mg by mouth daily      vitamin E 400 UNIT capsule Take 1 capsule by mouth daily      Zinc 25 MG TABS Take 1 tablet by mouth daily      Multiple Vitamins-Minerals (CENTRUM SILVER 50+MEN) TABS Take by mouth      rosuvastatin (CRESTOR) 5 MG tablet take 1 tablet by mouth once daily      LORazepam (ATIVAN) 0.5 MG tablet lorazepam 0.5 mg tablet      carBAMazepine (CARBATROL) 300 MG extended release capsule Take 1 capsule by mouth 3 times daily Morning and afternoon      levETIRAcetam (KEPPRA) 500 MG tablet Take 1 tablet by mouth 2 times daily Midday and evening      Lacosamide (VIMPAT PO) Take 100 mg by mouth 2 times daily Midday and bedtime       No current facility-administered medications for this encounter.           OBJECTIVE:  Alert and fully ambulatory. Pleasant and conversant.        Physical Examination: General appearance - alert, well appearing, and in no distress.          Wt Readings from Last 3 Encounters:   07/02/24 72.1 kg (159 lb)   06/27/24 72.4 kg (159 lb 9.6 oz)   06/27/24 72.9 kg (160 lb 11.2 oz)         ASSESSMENT/PLAN:     Patient is

## 2024-07-02 NOTE — PROGRESS NOTES
Tam Milner  7/2/2024  Wt Readings from Last 3 Encounters:   07/02/24 72.1 kg (159 lb)   06/27/24 72.4 kg (159 lb 9.6 oz)   06/27/24 72.9 kg (160 lb 11.2 oz)     Body mass index is 25.66 kg/m².        Treatment Area:Pelvis & Bed    Patient was seen today for weekly visit.     Comfort Alteration  KPS:90%  Fatigue: Moderate    Nutritional Alteration  Anorexia: No  Nausea: No  Vomiting: No     Elimination Alterations  Constipation: yes  Diarrhea:  no  Urinary Frequency/Urgency: Yes  Urinary Retention: Yes  Dysuria: No  Urinary Incontinence: Yes  Proctitis: Yes  Nocturia: Yes #/night: 5     Skin Alteration   Sensation:Burning    Radiation Dermatitis:  no    Emotional  Coping: effective    Sexuality Alteration  na    Injury, potential bleeding or infection: no    Lab Results   Component Value Date    WBC 4.0 (L) 06/26/2024    HGB 12.5 06/26/2024    HCT 35.2 (L) 06/26/2024     06/26/2024         /83   Pulse 55   Temp 97.4 °F (36.3 °C) (Temporal)   Resp 18   Wt 72.1 kg (159 lb)   SpO2 99%   BMI 25.66 kg/m²   BP within normal range? yes     Assessment/Plan:32/35fx;6400/7000cGy completed.    Adelina Rose RN

## 2024-07-03 ENCOUNTER — HOSPITAL ENCOUNTER (OUTPATIENT)
Dept: RADIATION ONCOLOGY | Age: 64
Discharge: HOME | End: 2024-07-03
Payer: OTHER GOVERNMENT

## 2024-07-03 PROCEDURE — 77385 HC NTSTY MODUL RAD TX DLVR SMPL: CPT | Performed by: RADIOLOGY

## 2024-07-05 ENCOUNTER — APPOINTMENT (OUTPATIENT)
Dept: RADIATION ONCOLOGY | Age: 64
End: 2024-07-05
Payer: OTHER GOVERNMENT

## 2024-07-08 ENCOUNTER — HOSPITAL ENCOUNTER (OUTPATIENT)
Dept: RADIATION ONCOLOGY | Age: 64
Discharge: HOME | End: 2024-07-08
Payer: OTHER GOVERNMENT

## 2024-07-08 PROCEDURE — 77385 HC NTSTY MODUL RAD TX DLVR SMPL: CPT | Performed by: RADIOLOGY

## 2024-07-08 PROCEDURE — 77014 CHG CT GUIDANCE RADIATION THERAPY FLDS PLACEMENT: CPT | Performed by: RADIOLOGY

## 2024-07-09 ENCOUNTER — APPOINTMENT (OUTPATIENT)
Dept: RADIATION ONCOLOGY | Age: 64
End: 2024-07-09
Payer: OTHER GOVERNMENT

## 2024-07-09 ENCOUNTER — HOSPITAL ENCOUNTER (OUTPATIENT)
Dept: RADIATION ONCOLOGY | Age: 64
Discharge: HOME | End: 2024-07-09
Payer: OTHER GOVERNMENT

## 2024-07-09 PROCEDURE — 77014 CHG CT GUIDANCE RADIATION THERAPY FLDS PLACEMENT: CPT | Performed by: RADIOLOGY

## 2024-07-09 PROCEDURE — 77427 RADIATION TX MANAGEMENT X5: CPT | Performed by: RADIOLOGY

## 2024-07-09 PROCEDURE — 77385 HC NTSTY MODUL RAD TX DLVR SMPL: CPT | Performed by: RADIOLOGY

## 2024-07-09 PROCEDURE — 77336 RADIATION PHYSICS CONSULT: CPT | Performed by: RADIOLOGY

## 2024-08-13 ENCOUNTER — HOSPITAL ENCOUNTER (OUTPATIENT)
Dept: RADIATION ONCOLOGY | Age: 64
Discharge: HOME | End: 2024-08-13

## 2024-08-13 VITALS
WEIGHT: 159.8 LBS | RESPIRATION RATE: 18 BRPM | HEART RATE: 55 BPM | TEMPERATURE: 97.9 F | DIASTOLIC BLOOD PRESSURE: 87 MMHG | BODY MASS INDEX: 25.79 KG/M2 | SYSTOLIC BLOOD PRESSURE: 132 MMHG | OXYGEN SATURATION: 99 %

## 2024-08-13 DIAGNOSIS — C61 PROSTATE CANCER (HCC): Primary | ICD-10-CM

## 2024-08-13 PROCEDURE — NBSRV NON-BILLABLE SERVICE: Performed by: RADIOLOGY

## 2024-08-13 ASSESSMENT — PAIN SCALES - GENERAL: PAINLEVEL_OUTOF10: 3

## 2024-08-13 ASSESSMENT — PAIN DESCRIPTION - LOCATION: LOCATION: JAW;FACE

## 2024-08-13 ASSESSMENT — PAIN DESCRIPTION - ORIENTATION: ORIENTATION: LEFT

## 2024-08-13 ASSESSMENT — PAIN DESCRIPTION - DESCRIPTORS: DESCRIPTORS: THROBBING;STABBING

## 2024-08-13 ASSESSMENT — PAIN DESCRIPTION - FREQUENCY: FREQUENCY: CONTINUOUS

## 2024-08-13 ASSESSMENT — PAIN DESCRIPTION - PAIN TYPE: TYPE: CHRONIC PAIN

## 2024-08-13 NOTE — PROGRESS NOTES
Tam Milner  8/13/2024  3:34 PM      Vitals:    08/13/24 1524   BP: 132/87   Pulse: 55   Resp: 18   Temp: 97.9 °F (36.6 °C)   SpO2: 99%    :    Wt Readings from Last 3 Encounters:   08/13/24 72.5 kg (159 lb 12.8 oz)   07/02/24 72.1 kg (159 lb)   06/27/24 72.4 kg (159 lb 9.6 oz)                Current Outpatient Medications:     carBAMazepine (TEGRETOL) 200 MG tablet, Take 1 tablet by mouth once, Disp: , Rfl:     levothyroxine (SYNTHROID) 112 MCG tablet, Take 1 tablet by mouth daily Take 1 tablet 6 days a wk and 1.5 tablet on Sunday, Disp: 95 tablet, Rfl: 3    tadalafil (CIALIS) 5 MG tablet, take 1 tablet by mouth once daily, Disp: , Rfl:     vitamin C (ASCORBIC ACID) 500 MG tablet, Take 1 tablet by mouth 2 times daily, Disp: , Rfl:     magnesium 30 MG tablet, Take 25 mg by mouth daily, Disp: , Rfl:     vitamin E 400 UNIT capsule, Take 1 capsule by mouth daily, Disp: , Rfl:     Zinc 25 MG TABS, Take 1 tablet by mouth daily, Disp: , Rfl:     Multiple Vitamins-Minerals (CENTRUM SILVER 50+MEN) TABS, Take by mouth, Disp: , Rfl:     rosuvastatin (CRESTOR) 5 MG tablet, take 1 tablet by mouth once daily, Disp: , Rfl:     LORazepam (ATIVAN) 0.5 MG tablet, lorazepam 0.5 mg tablet, Disp: , Rfl:     carBAMazepine (CARBATROL) 300 MG extended release capsule, Take 1 capsule by mouth 3 times daily Morning and afternoon, Disp: , Rfl:     levETIRAcetam (KEPPRA) 500 MG tablet, Take 1 tablet by mouth 2 times daily Midday and evening, Disp: , Rfl:     Lacosamide (VIMPAT PO), Take 100 mg by mouth 2 times daily Midday and bedtime, Disp: , Rfl:       Patient is seen today in follow up for a one month follow up appointment following RT for his prostate cancer. Patient received CTV to the  Pelvis & bed, and CTV to the bed from 5/20/24-07/09/24, completing 35 fx;7000cGy. Patient denies any pain or skin issues to the irradiated skin area of his pelvis. He does state he has urinary urgency and states his nocturia has improved from 5-6

## 2024-08-13 NOTE — PROGRESS NOTES
Radiation Oncology      Biochemically recurrent prostate cancer.    RT comp 7/9/24 (concurent ADT)    N/C toxicity check:   Pt denies hematochezia, melana, hematuria, dysuria or retention. He ha frqeuncey with nocturia x 2.  He note periodic stress incontinance at BL.  Fatigue at BL.    He has progressive TGN, we have breifly touched on his progessive Sx previsouly hwover focused on treating his prostae cnaer which is now competled 1 month with no treated related subacute tococity.  He is on dtriple Tx with flares almost any time he chews, talks, has dental work or brushes his teeth.  He states he is \"maxed\" on the Tegretol, Vimpat and Keppra.  He see Dr. Tilley in [place] and previous NEURO at [facility].  Last FU after decompression was in FEB 2020 (MVD NOV 2019 )      -SRS??      Richie Payton III, MD MS [facility]  [facility]  Radiation Oncology  Cell: [phone]    [place]:  [phone]   FAX: [phone]  [facility]:  [phone]   FAX:    [phone]  [facility]:  [phone]   FAX:  [phone]

## 2024-09-10 ENCOUNTER — HOSPITAL ENCOUNTER (OUTPATIENT)
Dept: RADIATION ONCOLOGY | Age: 64
Discharge: HOME | End: 2024-09-10
Payer: COMMERCIAL

## 2024-09-10 VITALS
OXYGEN SATURATION: 99 % | WEIGHT: 163.3 LBS | DIASTOLIC BLOOD PRESSURE: 79 MMHG | TEMPERATURE: 95.9 F | RESPIRATION RATE: 18 BRPM | SYSTOLIC BLOOD PRESSURE: 152 MMHG | BODY MASS INDEX: 26.36 KG/M2

## 2024-09-10 DIAGNOSIS — G50.0 TRIGEMINAL NEURALGIA: Primary | ICD-10-CM

## 2024-09-10 PROCEDURE — 99215 OFFICE O/P EST HI 40 MIN: CPT | Performed by: SPECIALIST

## 2024-09-10 PROCEDURE — 99205 OFFICE O/P NEW HI 60 MIN: CPT

## 2024-09-10 ASSESSMENT — PAIN DESCRIPTION - LOCATION: LOCATION: MOUTH

## 2024-09-10 ASSESSMENT — PAIN SCALES - GENERAL: PAINLEVEL_OUTOF10: 2

## 2024-09-11 ENCOUNTER — CLINICAL DOCUMENTATION (OUTPATIENT)
Dept: MRI IMAGING | Age: 64
End: 2024-09-11

## 2024-09-11 ENCOUNTER — TELEPHONE (OUTPATIENT)
Dept: MRI IMAGING | Age: 64
End: 2024-09-11

## 2024-09-11 DIAGNOSIS — G50.0 TRIGEMINAL NEURALGIA: Primary | ICD-10-CM

## 2024-09-11 DIAGNOSIS — Z01.812 PRE-PROCEDURE LAB EXAM: Primary | ICD-10-CM

## 2024-09-13 ENCOUNTER — HOSPITAL ENCOUNTER (OUTPATIENT)
Dept: MRI IMAGING | Age: 64
Discharge: HOME | End: 2024-09-15
Payer: COMMERCIAL

## 2024-09-13 ENCOUNTER — HOSPITAL ENCOUNTER (OUTPATIENT)
Age: 64
Discharge: HOME | End: 2024-09-13
Payer: COMMERCIAL

## 2024-09-13 DIAGNOSIS — Z01.812 PRE-PROCEDURE LAB EXAM: ICD-10-CM

## 2024-09-13 DIAGNOSIS — G50.0 TRIGEMINAL NEURALGIA: ICD-10-CM

## 2024-09-13 DIAGNOSIS — E89.0 POSTOPERATIVE HYPOTHYROIDISM: Primary | ICD-10-CM

## 2024-09-13 DIAGNOSIS — E89.0 POSTOPERATIVE HYPOTHYROIDISM: ICD-10-CM

## 2024-09-13 LAB
BUN SERPL-MCNC: 23 MG/DL (ref 6–23)
CREAT SERPL-MCNC: 0.8 MG/DL (ref 0.7–1.2)
GFR, ESTIMATED: >90 ML/MIN/1.73M2
T4 FREE SERPL-MCNC: 1.1 NG/DL (ref 0.9–1.7)
TSH SERPL DL<=0.05 MIU/L-ACNC: 0.13 UIU/ML (ref 0.27–4.2)

## 2024-09-13 PROCEDURE — 6360000004 HC RX CONTRAST MEDICATION: Performed by: RADIOLOGY

## 2024-09-13 PROCEDURE — 84443 ASSAY THYROID STIM HORMONE: CPT

## 2024-09-13 PROCEDURE — 84520 ASSAY OF UREA NITROGEN: CPT

## 2024-09-13 PROCEDURE — 36415 COLL VENOUS BLD VENIPUNCTURE: CPT

## 2024-09-13 PROCEDURE — 70553 MRI BRAIN STEM W/O & W/DYE: CPT

## 2024-09-13 PROCEDURE — A9577 INJ MULTIHANCE: HCPCS | Performed by: RADIOLOGY

## 2024-09-13 PROCEDURE — 84439 ASSAY OF FREE THYROXINE: CPT

## 2024-09-13 PROCEDURE — 82565 ASSAY OF CREATININE: CPT

## 2024-09-13 RX ADMIN — GADOBENATE DIMEGLUMINE 15 ML: 529 INJECTION, SOLUTION INTRAVENOUS at 15:43

## 2024-09-16 ENCOUNTER — TELEPHONE (OUTPATIENT)
Dept: ENDOCRINOLOGY | Age: 64
End: 2024-09-16

## 2024-09-16 ENCOUNTER — HOSPITAL ENCOUNTER (OUTPATIENT)
Dept: RADIATION ONCOLOGY | Age: 64
Discharge: HOME | End: 2024-09-16
Payer: COMMERCIAL

## 2024-09-16 PROCEDURE — 77334 RADIATION TREATMENT AID(S): CPT | Performed by: SPECIALIST

## 2024-09-16 PROCEDURE — 6360000004 HC RX CONTRAST MEDICATION: Performed by: RADIOLOGY

## 2024-09-16 RX ORDER — IOPAMIDOL 755 MG/ML
100 INJECTION, SOLUTION INTRAVASCULAR ONCE
Status: COMPLETED | OUTPATIENT
Start: 2024-09-16 | End: 2024-09-16

## 2024-09-16 RX ADMIN — IOPAMIDOL 100 ML: 755 INJECTION, SOLUTION INTRAVENOUS at 11:06

## 2024-09-17 DIAGNOSIS — G50.0 TRIGEMINAL NEURALGIA: Primary | ICD-10-CM

## 2024-09-17 RX ORDER — LORAZEPAM 1 MG/1
1 TABLET ORAL SEE ADMIN INSTRUCTIONS
Qty: 2 TABLET | Refills: 0 | Status: SHIPPED | OUTPATIENT
Start: 2024-09-17 | End: 2024-09-18

## 2024-09-18 ENCOUNTER — HOSPITAL ENCOUNTER (OUTPATIENT)
Dept: INFUSION THERAPY | Age: 64
Discharge: HOME | End: 2024-09-18
Payer: COMMERCIAL

## 2024-09-18 DIAGNOSIS — C82.94 FOLLICULAR LYMPHOMA OF LYMPH NODES OF UPPER EXTREMITY, UNSPECIFIED GRADE (HCC): ICD-10-CM

## 2024-09-18 LAB
ALBUMIN SERPL-MCNC: 4.1 G/DL (ref 3.5–5.2)
ALP SERPL-CCNC: 89 U/L (ref 40–129)
ALT SERPL-CCNC: 16 U/L (ref 0–40)
ANION GAP SERPL CALCULATED.3IONS-SCNC: 8 MMOL/L (ref 7–16)
AST SERPL-CCNC: 23 U/L (ref 0–39)
BASOPHILS # BLD: 0.03 K/UL (ref 0–0.2)
BASOPHILS NFR BLD: 1 % (ref 0–2)
BILIRUB SERPL-MCNC: <0.2 MG/DL (ref 0–1.2)
BUN SERPL-MCNC: 22 MG/DL (ref 6–23)
CALCIUM SERPL-MCNC: 9 MG/DL (ref 8.6–10.2)
CHLORIDE SERPL-SCNC: 100 MMOL/L (ref 98–107)
CO2 SERPL-SCNC: 28 MMOL/L (ref 22–29)
CREAT SERPL-MCNC: 0.8 MG/DL (ref 0.7–1.2)
EOSINOPHIL # BLD: 0.09 K/UL (ref 0.05–0.5)
EOSINOPHILS RELATIVE PERCENT: 3 % (ref 0–6)
ERYTHROCYTE [DISTWIDTH] IN BLOOD BY AUTOMATED COUNT: 11.9 % (ref 11.5–15)
GFR, ESTIMATED: >90 ML/MIN/1.73M2
GLUCOSE SERPL-MCNC: 95 MG/DL (ref 74–99)
HCT VFR BLD AUTO: 33.8 % (ref 37–54)
HGB BLD-MCNC: 11.9 G/DL (ref 12.5–16.5)
LDH SERPL-CCNC: 174 U/L (ref 135–225)
LYMPHOCYTES NFR BLD: 0.69 K/UL (ref 1.5–4)
LYMPHOCYTES RELATIVE PERCENT: 19 % (ref 20–42)
MAGNESIUM SERPL-MCNC: 2 MG/DL (ref 1.6–2.6)
MCH RBC QN AUTO: 34 PG (ref 26–35)
MCHC RBC AUTO-ENTMCNC: 35.2 G/DL (ref 32–34.5)
MCV RBC AUTO: 96.6 FL (ref 80–99.9)
MONOCYTES NFR BLD: 0.28 K/UL (ref 0.1–0.95)
MONOCYTES NFR BLD: 8 % (ref 2–12)
NEUTROPHILS NFR BLD: 69 % (ref 43–80)
NEUTS SEG NFR BLD: 2.49 K/UL (ref 1.8–7.3)
PLATELET # BLD AUTO: 194 K/UL (ref 130–450)
PMV BLD AUTO: 9.8 FL (ref 7–12)
POTASSIUM SERPL-SCNC: 4.6 MMOL/L (ref 3.5–5)
PROT SERPL-MCNC: 6.6 G/DL (ref 6.4–8.3)
RBC # BLD AUTO: 3.5 M/UL (ref 3.8–5.8)
SODIUM SERPL-SCNC: 136 MMOL/L (ref 132–146)
WBC OTHER # BLD: 3.6 K/UL (ref 4.5–11.5)

## 2024-09-18 PROCEDURE — 83735 ASSAY OF MAGNESIUM: CPT

## 2024-09-18 PROCEDURE — 83615 LACTATE (LD) (LDH) ENZYME: CPT

## 2024-09-18 PROCEDURE — 84153 ASSAY OF PSA TOTAL: CPT

## 2024-09-18 PROCEDURE — 85025 COMPLETE CBC W/AUTO DIFF WBC: CPT

## 2024-09-18 PROCEDURE — 36415 COLL VENOUS BLD VENIPUNCTURE: CPT

## 2024-09-18 PROCEDURE — 80053 COMPREHEN METABOLIC PANEL: CPT

## 2024-09-19 ENCOUNTER — HOSPITAL ENCOUNTER (OUTPATIENT)
Dept: INFUSION THERAPY | Age: 64
Discharge: HOME | End: 2024-09-19

## 2024-09-19 ENCOUNTER — OFFICE VISIT (OUTPATIENT)
Dept: ONCOLOGY | Age: 64
End: 2024-09-19
Payer: COMMERCIAL

## 2024-09-19 VITALS
OXYGEN SATURATION: 100 % | BODY MASS INDEX: 26.36 KG/M2 | HEIGHT: 66 IN | TEMPERATURE: 97.4 F | SYSTOLIC BLOOD PRESSURE: 137 MMHG | HEART RATE: 59 BPM | WEIGHT: 164 LBS | DIASTOLIC BLOOD PRESSURE: 85 MMHG

## 2024-09-19 DIAGNOSIS — C61 PROSTATE CANCER (HCC): Primary | ICD-10-CM

## 2024-09-19 DIAGNOSIS — C82.94 FOLLICULAR LYMPHOMA OF LYMPH NODES OF UPPER EXTREMITY, UNSPECIFIED GRADE (HCC): ICD-10-CM

## 2024-09-19 DIAGNOSIS — C73 THYROID CANCER (HCC): ICD-10-CM

## 2024-09-19 LAB — PSA SERPL-MCNC: <0.01 NG/ML (ref 0–4)

## 2024-09-19 PROCEDURE — G8427 DOCREV CUR MEDS BY ELIG CLIN: HCPCS | Performed by: INTERNAL MEDICINE

## 2024-09-19 PROCEDURE — 99212 OFFICE O/P EST SF 10 MIN: CPT

## 2024-09-19 PROCEDURE — G8419 CALC BMI OUT NRM PARAM NOF/U: HCPCS | Performed by: INTERNAL MEDICINE

## 2024-09-19 PROCEDURE — 99214 OFFICE O/P EST MOD 30 MIN: CPT | Performed by: INTERNAL MEDICINE

## 2024-09-19 PROCEDURE — 3017F COLORECTAL CA SCREEN DOC REV: CPT | Performed by: INTERNAL MEDICINE

## 2024-09-19 PROCEDURE — 1036F TOBACCO NON-USER: CPT | Performed by: INTERNAL MEDICINE

## 2024-09-24 ENCOUNTER — CLINICAL DOCUMENTATION (OUTPATIENT)
Dept: RADIATION ONCOLOGY | Age: 64
End: 2024-09-24

## 2024-09-26 ENCOUNTER — HOSPITAL ENCOUNTER (OUTPATIENT)
Dept: RADIATION ONCOLOGY | Age: 64
Discharge: HOME | End: 2024-09-26
Payer: COMMERCIAL

## 2024-09-26 PROCEDURE — 77334 RADIATION TREATMENT AID(S): CPT | Performed by: SPECIALIST

## 2024-09-26 PROCEDURE — 6360000004 HC RX CONTRAST MEDICATION: Performed by: SPECIALIST

## 2024-09-26 RX ORDER — IOPAMIDOL 755 MG/ML
100 INJECTION, SOLUTION INTRAVASCULAR ONCE
Status: COMPLETED | OUTPATIENT
Start: 2024-09-26 | End: 2024-09-26

## 2024-09-26 RX ADMIN — IOPAMIDOL 100 ML: 755 INJECTION, SOLUTION INTRAVENOUS at 15:03

## 2024-10-03 ENCOUNTER — CLINICAL DOCUMENTATION (OUTPATIENT)
Dept: RADIATION ONCOLOGY | Age: 64
End: 2024-10-03

## 2024-10-03 NOTE — PROGRESS NOTES
RADIATION ONCOLOGY NOTE      Fusion was performed today between the high-resolution fiesta MRI and a high-resolution CT simulation.  This was checked and confirmed with Dr. Ballesteros, our neuroradiologist..  We first reviewed the MRI results as they were dictated regarding the right foramen ovale and the trigeminal nerve as well as the mandibular condyle.  Please Dr. Ballesteros's impression that the a enhancement that seen is likely related to the vascular structures surrounding the nerve at the level of the foramen ovale.  Also when compared to the contralateral side the enhancement in the mandibular condyle are within normal limits.    We then spent a significant amount of time identifying the left Trigeminal nerve and its exit from the brainstem.  The treatment location was also identified as mid to distal one third of the trigeminal nerve prior to entry to [place].        Gustavo Real MD, TAHIRA, FACRO, FACR, FCTSI, FASTRO    Department of Radiation Oncology  [facility]: [phone] (FAX: [phone])  [facility]: [phone] (FAX: [phone])  [facility]:  [phone] (FAX:  [phone])    NOTE: This report was transcribed using voice recognition software. Every effort was made to ensure accuracy; however, inadvertent computerized transcription errors may be present.

## 2024-10-16 ENCOUNTER — TELEPHONE (OUTPATIENT)
Dept: RADIATION ONCOLOGY | Age: 64
End: 2024-10-16

## 2024-10-16 NOTE — TELEPHONE ENCOUNTER
Had a conference call with patient and Dr. Real to discuss progress with the plan for his treatment. He is aware that physics is making sure we have precision and we will keep him posted on the plan.

## 2024-10-22 ENCOUNTER — OFFICE VISIT (OUTPATIENT)
Dept: ENT CLINIC | Age: 64
End: 2024-10-22
Payer: COMMERCIAL

## 2024-10-22 VITALS
HEART RATE: 57 BPM | SYSTOLIC BLOOD PRESSURE: 137 MMHG | HEIGHT: 68 IN | DIASTOLIC BLOOD PRESSURE: 82 MMHG | BODY MASS INDEX: 24.75 KG/M2 | WEIGHT: 163.3 LBS

## 2024-10-22 DIAGNOSIS — C73 THYROID CANCER (HCC): Primary | ICD-10-CM

## 2024-10-22 DIAGNOSIS — R49.0 DYSPHONIA: ICD-10-CM

## 2024-10-22 DIAGNOSIS — C73 PAPILLARY THYROID CARCINOMA (HCC): ICD-10-CM

## 2024-10-22 PROCEDURE — G8484 FLU IMMUNIZE NO ADMIN: HCPCS | Performed by: OTOLARYNGOLOGY

## 2024-10-22 PROCEDURE — G8427 DOCREV CUR MEDS BY ELIG CLIN: HCPCS | Performed by: OTOLARYNGOLOGY

## 2024-10-22 PROCEDURE — 99214 OFFICE O/P EST MOD 30 MIN: CPT | Performed by: OTOLARYNGOLOGY

## 2024-10-22 PROCEDURE — 3017F COLORECTAL CA SCREEN DOC REV: CPT | Performed by: OTOLARYNGOLOGY

## 2024-10-22 PROCEDURE — 31575 DIAGNOSTIC LARYNGOSCOPY: CPT | Performed by: OTOLARYNGOLOGY

## 2024-10-22 PROCEDURE — G8420 CALC BMI NORM PARAMETERS: HCPCS | Performed by: OTOLARYNGOLOGY

## 2024-10-22 PROCEDURE — 1036F TOBACCO NON-USER: CPT | Performed by: OTOLARYNGOLOGY

## 2024-10-22 NOTE — PROGRESS NOTES
10/22/24    Pt here for follow up per Dr. Glaser office; Scope.  Facial pain 4 on pain scale. No issues swallowing. No change in voice. Drinks 40oz of water, 3 cups coffee  caffeine, and 1 or 2  alcohol drinks per week. Weight 163.3 Weight Has been stable.     MRI Brain W WO contrast done 09/15/2024, and CT.  MRI is in [facility], CT done at [facility] do not see in imaging.

## 2024-10-22 NOTE — PROGRESS NOTES
Dear Dr Lopez, MD Farhan Archuleta, Gustavo Melgoza MD     We had the pleasure of seeing Tam Milner, 1960 here    on 10/22/2024  Please see below for review of care and plans.     Chief complaint-     ICD-10-CM    1. Thyroid cancer (HCC)  C73       2. Papillary thyroid carcinoma (HCC)  C73       3. Dysphonia  R49.0               2/27/23 - THYROIDECTOMY COMPLETION RIGHT SIDE     9/12/22 - LEFT COLLINS THYROIDECTOMY     History of Present Illness- Patient presents with papillary thyroid carcinoma of the left thyroid. 8 x 10 x 7 mm nodule was found on PET scan as patient with history of lymphoma. No B systems and is not requiring treatment at this time for the lymphoma. He reports raspy voice occasionally. No smoking. Social drinking. Pt does have history of trigeminal neuralgia.     TSH 2.660    9/20/22: Pt here for p/o collins thyroidectomy. Lt side internal neck pain, tightness in neck. Very little difficulty swallowing, sore throat. Unable to become high pitched/become loud. Drinks 30oz of water, 2 cups of coffee, and no alcohol. Weight is stable.    12/13/22: Pt here for 3 mo collins thyroidectomy. Discomfort in lower neck when looking upwards, and a slight pinching sensation in lower lefty neck periodically pain. No difficulty swallowing. States has an issue pronouncing certain words and notices a gravel to voice. Drinks 30 oz of water, 2-3 cups of coffee, and occasional alcohol. Weight is stable.       3/9/23: Pt here for post op completion of Right thyroidectomy. Tenderness when tilting head back and stiffness in neck when looking left and right. Some difficulty swallowing due to stiffness, just notices it feels different to swallow. No change in voice. Drinks 30 oz of water, 2-3 cups of coffee, and occasional alcohol. Weight loss of 6 lbs since LOV 2/27/23.    9/5/23: Pt here for 6 mo follow up Thyroidectomy completion. No pain, mentions a tightness in neck when tilting head back far. No difficulty

## 2024-11-07 ENCOUNTER — OFFICE VISIT (OUTPATIENT)
Dept: ENDOCRINOLOGY | Age: 64
End: 2024-11-07
Payer: COMMERCIAL

## 2024-11-07 VITALS
BODY MASS INDEX: 25.31 KG/M2 | SYSTOLIC BLOOD PRESSURE: 134 MMHG | HEIGHT: 68 IN | WEIGHT: 167 LBS | HEART RATE: 60 BPM | OXYGEN SATURATION: 99 % | DIASTOLIC BLOOD PRESSURE: 83 MMHG

## 2024-11-07 DIAGNOSIS — E89.0 POSTOPERATIVE HYPOTHYROIDISM: ICD-10-CM

## 2024-11-07 DIAGNOSIS — C73 THYROID CANCER (HCC): Primary | ICD-10-CM

## 2024-11-07 PROCEDURE — 99214 OFFICE O/P EST MOD 30 MIN: CPT | Performed by: INTERNAL MEDICINE

## 2024-11-07 PROCEDURE — G8419 CALC BMI OUT NRM PARAM NOF/U: HCPCS | Performed by: INTERNAL MEDICINE

## 2024-11-07 PROCEDURE — 3017F COLORECTAL CA SCREEN DOC REV: CPT | Performed by: INTERNAL MEDICINE

## 2024-11-07 PROCEDURE — G2211 COMPLEX E/M VISIT ADD ON: HCPCS | Performed by: INTERNAL MEDICINE

## 2024-11-07 PROCEDURE — 1036F TOBACCO NON-USER: CPT | Performed by: INTERNAL MEDICINE

## 2024-11-07 PROCEDURE — G8427 DOCREV CUR MEDS BY ELIG CLIN: HCPCS | Performed by: INTERNAL MEDICINE

## 2024-11-07 PROCEDURE — G8484 FLU IMMUNIZE NO ADMIN: HCPCS | Performed by: INTERNAL MEDICINE

## 2024-11-07 RX ORDER — LEVOTHYROXINE SODIUM 112 UG/1
TABLET ORAL
Qty: 95 TABLET | Refills: 3 | Status: SHIPPED | OUTPATIENT
Start: 2024-11-07

## 2024-11-24 ENCOUNTER — HOSPITAL ENCOUNTER (OUTPATIENT)
Dept: ULTRASOUND IMAGING | Age: 64
Discharge: HOME | End: 2024-11-24
Payer: COMMERCIAL

## 2024-11-24 DIAGNOSIS — C73 THYROID CANCER (HCC): ICD-10-CM

## 2024-11-24 PROCEDURE — 76536 US EXAM OF HEAD AND NECK: CPT

## 2024-12-01 ENCOUNTER — TELEPHONE (OUTPATIENT)
Dept: ENDOCRINOLOGY | Age: 64
End: 2024-12-01

## 2024-12-06 ENCOUNTER — HOSPITAL ENCOUNTER (OUTPATIENT)
Dept: INFUSION THERAPY | Age: 64
Discharge: HOME | End: 2024-12-06
Payer: COMMERCIAL

## 2024-12-06 DIAGNOSIS — C82.94 FOLLICULAR LYMPHOMA OF LYMPH NODES OF UPPER EXTREMITY, UNSPECIFIED GRADE (HCC): ICD-10-CM

## 2024-12-06 DIAGNOSIS — C61 PROSTATE CANCER (HCC): ICD-10-CM

## 2024-12-06 LAB
ALBUMIN SERPL-MCNC: 4.2 G/DL (ref 3.5–5.2)
ALP SERPL-CCNC: 103 U/L (ref 40–129)
ALT SERPL-CCNC: 13 U/L (ref 0–40)
ANION GAP SERPL CALCULATED.3IONS-SCNC: 7 MMOL/L (ref 7–16)
AST SERPL-CCNC: 22 U/L (ref 0–39)
BASOPHILS # BLD: 0.03 K/UL (ref 0–0.2)
BASOPHILS NFR BLD: 1 % (ref 0–2)
BILIRUB SERPL-MCNC: 0.2 MG/DL (ref 0–1.2)
BUN SERPL-MCNC: 27 MG/DL (ref 6–23)
CALCIUM SERPL-MCNC: 8.9 MG/DL (ref 8.6–10.2)
CHLORIDE SERPL-SCNC: 103 MMOL/L (ref 98–107)
CO2 SERPL-SCNC: 29 MMOL/L (ref 22–29)
CREAT SERPL-MCNC: 1 MG/DL (ref 0.7–1.2)
EOSINOPHIL # BLD: 0.07 K/UL (ref 0.05–0.5)
EOSINOPHILS RELATIVE PERCENT: 2 % (ref 0–6)
ERYTHROCYTE [DISTWIDTH] IN BLOOD BY AUTOMATED COUNT: 12 % (ref 11.5–15)
GFR, ESTIMATED: 89 ML/MIN/1.73M2
GLUCOSE SERPL-MCNC: 90 MG/DL (ref 74–99)
HCT VFR BLD AUTO: 35 % (ref 37–54)
HGB BLD-MCNC: 12.3 G/DL (ref 12.5–16.5)
IMM GRANULOCYTES # BLD AUTO: <0.03 K/UL (ref 0–0.58)
IMM GRANULOCYTES NFR BLD: 0 % (ref 0–5)
LDH SERPL-CCNC: 165 U/L (ref 135–225)
LYMPHOCYTES NFR BLD: 0.71 K/UL (ref 1.5–4)
LYMPHOCYTES RELATIVE PERCENT: 16 % (ref 20–42)
MAGNESIUM SERPL-MCNC: 2 MG/DL (ref 1.6–2.6)
MCH RBC QN AUTO: 33 PG (ref 26–35)
MCHC RBC AUTO-ENTMCNC: 35.1 G/DL (ref 32–34.5)
MCV RBC AUTO: 93.8 FL (ref 80–99.9)
MONOCYTES NFR BLD: 0.63 K/UL (ref 0.1–0.95)
MONOCYTES NFR BLD: 15 % (ref 2–12)
NEUTROPHILS NFR BLD: 67 % (ref 43–80)
NEUTS SEG NFR BLD: 2.88 K/UL (ref 1.8–7.3)
PLATELET # BLD AUTO: 201 K/UL (ref 130–450)
PMV BLD AUTO: 9.7 FL (ref 7–12)
POTASSIUM SERPL-SCNC: 4.3 MMOL/L (ref 3.5–5)
PROT SERPL-MCNC: 6.4 G/DL (ref 6.4–8.3)
PSA SERPL-MCNC: <0.01 NG/ML (ref 0–4)
RBC # BLD AUTO: 3.73 M/UL (ref 3.8–5.8)
SODIUM SERPL-SCNC: 139 MMOL/L (ref 132–146)
WBC OTHER # BLD: 4.3 K/UL (ref 4.5–11.5)

## 2024-12-06 PROCEDURE — 83735 ASSAY OF MAGNESIUM: CPT

## 2024-12-06 PROCEDURE — 80053 COMPREHEN METABOLIC PANEL: CPT

## 2024-12-06 PROCEDURE — 36415 COLL VENOUS BLD VENIPUNCTURE: CPT

## 2024-12-06 PROCEDURE — 85025 COMPLETE CBC W/AUTO DIFF WBC: CPT

## 2024-12-06 PROCEDURE — 84153 ASSAY OF PSA TOTAL: CPT

## 2024-12-06 PROCEDURE — 83615 LACTATE (LD) (LDH) ENZYME: CPT

## 2024-12-09 ENCOUNTER — OFFICE VISIT (OUTPATIENT)
Dept: ONCOLOGY | Age: 64
End: 2024-12-09
Payer: COMMERCIAL

## 2024-12-09 VITALS
WEIGHT: 165.3 LBS | DIASTOLIC BLOOD PRESSURE: 85 MMHG | SYSTOLIC BLOOD PRESSURE: 129 MMHG | HEIGHT: 68 IN | TEMPERATURE: 97 F | HEART RATE: 61 BPM | BODY MASS INDEX: 25.05 KG/M2 | OXYGEN SATURATION: 97 %

## 2024-12-09 DIAGNOSIS — C73 THYROID CANCER (HCC): ICD-10-CM

## 2024-12-09 DIAGNOSIS — C82.94 FOLLICULAR LYMPHOMA OF LYMPH NODES OF UPPER EXTREMITY, UNSPECIFIED GRADE (HCC): ICD-10-CM

## 2024-12-09 DIAGNOSIS — C61 PROSTATE CANCER (HCC): Primary | ICD-10-CM

## 2024-12-09 PROCEDURE — 1036F TOBACCO NON-USER: CPT | Performed by: INTERNAL MEDICINE

## 2024-12-09 PROCEDURE — 99214 OFFICE O/P EST MOD 30 MIN: CPT | Performed by: INTERNAL MEDICINE

## 2024-12-09 PROCEDURE — 99212 OFFICE O/P EST SF 10 MIN: CPT

## 2024-12-09 PROCEDURE — G8419 CALC BMI OUT NRM PARAM NOF/U: HCPCS | Performed by: INTERNAL MEDICINE

## 2024-12-09 PROCEDURE — G8484 FLU IMMUNIZE NO ADMIN: HCPCS | Performed by: INTERNAL MEDICINE

## 2024-12-09 PROCEDURE — G8427 DOCREV CUR MEDS BY ELIG CLIN: HCPCS | Performed by: INTERNAL MEDICINE

## 2024-12-09 PROCEDURE — 3017F COLORECTAL CA SCREEN DOC REV: CPT | Performed by: INTERNAL MEDICINE

## 2024-12-09 NOTE — PROGRESS NOTES
[facility] MEDICAL ONCOLOGY  [address] [address]  Dept: [phone]  Loc: [phone]  Attending progress note      Reason for Visit:   Non-Hodgkin lymphoma.    Referring Physician:  Bari Alvarenga MD    PCP:  Britton Lopez MD    History of Present Illness:     Mr. Chappell is a pleasant 64 year old gentleman with a PMHx of trigeminal neuralgia who has been referred to us for follicular lymphoma. He initially had shingles in 7/2021 and felt a lymph node in his axillary area. He does not note progression of size or increase in number. Denies any weight loss, night sweats and low grade fevers. He had CT scan showed lymphadenopathy in the left axilla measuring 11-27 mm. 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 CD10 negative, [facility] has sent for FISH and molecular sequencing. Lab work shows normal kidney function, no anemia. He also had a colonoscopy done by Dr. 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 He underwent L hemithyroidectomy on 9/12/2022 by Dr. Garcia. Surgical pathology revealed Left thyroid, left hemithyroidectomy: Tumor size: 1.3 cm in greatest dimension. Papillary carcinoma, follicular variant, infiltrative; Carcinoma is present at an inked peripheral margin. Lymphocytic thyroiditis. ENT recommended for completion thyroidectomy as pathology revealed 1.3 cm size, + superior margin, no extrathyroidal extension.  Since his last visit, the patient was seen by Dr. Garcia and Dr. Modi, he underwent on 3/7/2023 a completion thyroidectomy.  He had on 5/5/2023 radioactive iodine

## 2025-03-13 ENCOUNTER — HOSPITAL ENCOUNTER (OUTPATIENT)
Dept: INFUSION THERAPY | Age: 65
Discharge: HOME | End: 2025-03-13
Payer: COMMERCIAL

## 2025-03-13 ENCOUNTER — OFFICE VISIT (OUTPATIENT)
Dept: ONCOLOGY | Age: 65
End: 2025-03-13
Payer: COMMERCIAL

## 2025-03-13 VITALS
DIASTOLIC BLOOD PRESSURE: 81 MMHG | TEMPERATURE: 98.8 F | HEIGHT: 68 IN | HEART RATE: 55 BPM | OXYGEN SATURATION: 100 % | BODY MASS INDEX: 24.89 KG/M2 | WEIGHT: 164.2 LBS | SYSTOLIC BLOOD PRESSURE: 136 MMHG

## 2025-03-13 DIAGNOSIS — C61 PROSTATE CANCER (HCC): Primary | ICD-10-CM

## 2025-03-13 DIAGNOSIS — C82.94 FOLLICULAR LYMPHOMA OF LYMPH NODES OF UPPER EXTREMITY, UNSPECIFIED GRADE (HCC): ICD-10-CM

## 2025-03-13 DIAGNOSIS — C73 THYROID CANCER (HCC): ICD-10-CM

## 2025-03-13 DIAGNOSIS — C82.94: ICD-10-CM

## 2025-03-13 LAB
ALBUMIN SERPL-MCNC: 4.1 G/DL (ref 3.5–5.2)
ALP SERPL-CCNC: 105 U/L (ref 40–129)
ALT SERPL-CCNC: 16 U/L (ref 0–40)
ANION GAP SERPL CALCULATED.3IONS-SCNC: 10 MMOL/L (ref 7–16)
AST SERPL-CCNC: 26 U/L (ref 0–39)
BASOPHILS # BLD: 0.03 K/UL (ref 0–0.2)
BASOPHILS NFR BLD: 1 % (ref 0–2)
BILIRUB SERPL-MCNC: 0.3 MG/DL (ref 0–1.2)
BUN SERPL-MCNC: 26 MG/DL (ref 6–23)
CALCIUM SERPL-MCNC: 9.2 MG/DL (ref 8.6–10.2)
CHLORIDE SERPL-SCNC: 106 MMOL/L (ref 98–107)
CO2 SERPL-SCNC: 25 MMOL/L (ref 22–29)
CREAT SERPL-MCNC: 0.8 MG/DL (ref 0.7–1.2)
EOSINOPHIL # BLD: 0.08 K/UL (ref 0.05–0.5)
EOSINOPHILS RELATIVE PERCENT: 2 % (ref 0–6)
ERYTHROCYTE [DISTWIDTH] IN BLOOD BY AUTOMATED COUNT: 12.3 % (ref 11.5–15)
GFR, ESTIMATED: >90 ML/MIN/1.73M2
GLUCOSE SERPL-MCNC: 95 MG/DL (ref 74–99)
HCT VFR BLD AUTO: 39 % (ref 37–54)
HGB BLD-MCNC: 13.4 G/DL (ref 12.5–16.5)
IMM GRANULOCYTES # BLD AUTO: <0.03 K/UL (ref 0–0.58)
IMM GRANULOCYTES NFR BLD: 0 % (ref 0–5)
LDH SERPL-CCNC: 195 U/L (ref 135–225)
LYMPHOCYTES NFR BLD: 0.61 K/UL (ref 1.5–4)
LYMPHOCYTES RELATIVE PERCENT: 15 % (ref 20–42)
MAGNESIUM SERPL-MCNC: 1.9 MG/DL (ref 1.6–2.6)
MCH RBC QN AUTO: 32.2 PG (ref 26–35)
MCHC RBC AUTO-ENTMCNC: 34.4 G/DL (ref 32–34.5)
MCV RBC AUTO: 93.8 FL (ref 80–99.9)
MONOCYTES NFR BLD: 0.42 K/UL (ref 0.1–0.95)
MONOCYTES NFR BLD: 10 % (ref 2–12)
NEUTROPHILS NFR BLD: 73 % (ref 43–80)
NEUTS SEG NFR BLD: 3.04 K/UL (ref 1.8–7.3)
PLATELET # BLD AUTO: 190 K/UL (ref 130–450)
PMV BLD AUTO: 10.4 FL (ref 7–12)
POTASSIUM SERPL-SCNC: 4.2 MMOL/L (ref 3.5–5)
PROT SERPL-MCNC: 6.7 G/DL (ref 6.4–8.3)
RBC # BLD AUTO: 4.16 M/UL (ref 3.8–5.8)
SODIUM SERPL-SCNC: 141 MMOL/L (ref 132–146)
WBC OTHER # BLD: 4.2 K/UL (ref 4.5–11.5)

## 2025-03-13 PROCEDURE — 1036F TOBACCO NON-USER: CPT | Performed by: INTERNAL MEDICINE

## 2025-03-13 PROCEDURE — 3017F COLORECTAL CA SCREEN DOC REV: CPT | Performed by: INTERNAL MEDICINE

## 2025-03-13 PROCEDURE — 85025 COMPLETE CBC W/AUTO DIFF WBC: CPT

## 2025-03-13 PROCEDURE — G8427 DOCREV CUR MEDS BY ELIG CLIN: HCPCS | Performed by: INTERNAL MEDICINE

## 2025-03-13 PROCEDURE — 83615 LACTATE (LD) (LDH) ENZYME: CPT

## 2025-03-13 PROCEDURE — 99212 OFFICE O/P EST SF 10 MIN: CPT

## 2025-03-13 PROCEDURE — 84153 ASSAY OF PSA TOTAL: CPT

## 2025-03-13 PROCEDURE — 36415 COLL VENOUS BLD VENIPUNCTURE: CPT

## 2025-03-13 PROCEDURE — 80053 COMPREHEN METABOLIC PANEL: CPT

## 2025-03-13 PROCEDURE — 83735 ASSAY OF MAGNESIUM: CPT

## 2025-03-13 PROCEDURE — 99214 OFFICE O/P EST MOD 30 MIN: CPT | Performed by: INTERNAL MEDICINE

## 2025-03-13 PROCEDURE — G8420 CALC BMI NORM PARAMETERS: HCPCS | Performed by: INTERNAL MEDICINE

## 2025-03-13 NOTE — PROGRESS NOTES
[facility] MEDICAL ONCOLOGY  [address] [address]  Dept: [phone]  Loc: [phone]  Attending progress note      Reason for Visit:   Non-Hodgkin lymphoma.    Referring Physician:  Bari Alvarenga MD    PCP:  Britton Lopez MD    History of Present Illness:     Mr. Chappell is a pleasant 65 year old gentleman with a PMHx of trigeminal neuralgia who has been referred to us for follicular lymphoma. He initially had shingles in 7/2021 and felt a lymph node in his axillary area. He does not note progression of size or increase in number. Denies any weight loss, night sweats and low grade fevers. He had CT scan showed lymphadenopathy in the left axilla measuring 11-27 mm. 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 CD10 negative, [facility] has sent for FISH and molecular sequencing. Lab work shows normal kidney function, no anemia. He also had a colonoscopy done by Dr. 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 He underwent L hemithyroidectomy on 9/12/2022 by Dr. Garcia. Surgical pathology revealed Left thyroid, left hemithyroidectomy: Tumor size: 1.3 cm in greatest dimension. Papillary carcinoma, follicular variant, infiltrative; Carcinoma is present at an inked peripheral margin. Lymphocytic thyroiditis. ENT recommended for completion thyroidectomy as pathology revealed 1.3 cm size, + superior margin, no extrathyroidal extension.  Since his last visit, the patient was seen by Dr. Garcia and Dr. Modi, he underwent on 3/7/2023 a completion thyroidectomy.  He had on 5/5/2023 radioactive iodine

## 2025-03-14 LAB — PSA SERPL-MCNC: <0.01 NG/ML (ref 0–4)

## 2025-04-09 ENCOUNTER — TELEPHONE (OUTPATIENT)
Dept: ENT CLINIC | Age: 65
End: 2025-04-09

## 2025-04-18 ENCOUNTER — HOSPITAL ENCOUNTER (OUTPATIENT)
Age: 65
Discharge: HOME | End: 2025-04-18
Payer: OTHER GOVERNMENT

## 2025-04-18 DIAGNOSIS — C73 THYROID CANCER (HCC): ICD-10-CM

## 2025-04-18 DIAGNOSIS — E89.0 POSTOPERATIVE HYPOTHYROIDISM: ICD-10-CM

## 2025-04-18 LAB
T4 FREE SERPL-MCNC: 1.3 NG/DL (ref 0.9–1.7)
TSH SERPL DL<=0.05 MIU/L-ACNC: 0.08 UIU/ML (ref 0.27–4.2)

## 2025-04-18 PROCEDURE — 86800 THYROGLOBULIN ANTIBODY: CPT

## 2025-04-18 PROCEDURE — 84439 ASSAY OF FREE THYROXINE: CPT

## 2025-04-18 PROCEDURE — 84443 ASSAY THYROID STIM HORMONE: CPT

## 2025-04-18 PROCEDURE — 36415 COLL VENOUS BLD VENIPUNCTURE: CPT

## 2025-04-19 ENCOUNTER — RESULTS FOLLOW-UP (OUTPATIENT)
Dept: ENDOCRINOLOGY | Age: 65
End: 2025-04-19

## 2025-04-20 NOTE — TELEPHONE ENCOUNTER
Notify patient  Thyroid hormones are good for thyroid cancer.  I am still waiting for the tumor marker result

## 2025-04-21 LAB
THYROGLOB AB SERPL-ACNC: <1.5 IU/ML (ref 0–4)
THYROGLOB SERPL-MCNC: <0.1 NG/ML (ref 1.3–31.8)
THYROGLOB SERPL-MCNC: ABNORMAL NG/ML (ref 1.3–31.8)

## 2025-04-30 ENCOUNTER — OFFICE VISIT (OUTPATIENT)
Dept: ENT CLINIC | Age: 65
End: 2025-04-30

## 2025-04-30 VITALS
DIASTOLIC BLOOD PRESSURE: 76 MMHG | TEMPERATURE: 97.3 F | WEIGHT: 167.1 LBS | SYSTOLIC BLOOD PRESSURE: 124 MMHG | HEIGHT: 68 IN | HEART RATE: 54 BPM | OXYGEN SATURATION: 97 % | BODY MASS INDEX: 25.33 KG/M2

## 2025-04-30 DIAGNOSIS — C73 THYROID CANCER (HCC): Primary | ICD-10-CM

## 2025-04-30 DIAGNOSIS — C73 PAPILLARY THYROID CARCINOMA (HCC): ICD-10-CM

## 2025-04-30 ASSESSMENT — ENCOUNTER SYMPTOMS
SINUS PRESSURE: 0
WHEEZING: 0
SORE THROAT: 0
STRIDOR: 0
CHOKING: 0
RHINORRHEA: 0
COUGH: 0
SINUS PAIN: 0
SHORTNESS OF BREATH: 0

## 2025-05-01 NOTE — PROGRESS NOTES
Department of Otolaryngology  Office Consult Note  4/30/25          Subjective:        Chief Complaint:  had concerns including Follow-up (6 month follow up thyroid cancer patient states no concerns).     Patient ID: Tam Milner is a 65 y.o. adult.    HPI: Patient presents as  established patient for papillary thyroid cancer. Patient has pmhx of follicular lymphoma and prostate ca as well.  There have been no recent changes.  Patient overall feels well.  Eating and drinking without complication.  No changes in his voice.  Weight stable.  Denies fever, chills, night sweats, weight loss.    Cancer Summary   Papillary thyroid carcinoma [id number] Left  9/2022: left hemitthyroidectomy  2/2023: right completion thyroidectomy   [place] with Dr Woodson   4/18/2025: thyroglobulin undetectable, tsh/free T4 stable     Review of Systems   Constitutional: Negative.    HENT:  Negative for congestion, ear discharge, ear pain, hearing loss, rhinorrhea, sinus pressure, sinus pain, sore throat and tinnitus.    Respiratory:  Negative for cough, choking, shortness of breath, wheezing and stridor.    Cardiovascular:  Negative for chest pain.   Skin:  Negative for rash.   Allergic/Immunologic: Negative for environmental allergies and immunocompromised state.   Neurological:  Negative for dizziness, weakness, light-headedness and headaches.   Psychiatric/Behavioral:  Negative for confusion.    All other systems reviewed and are negative.        Past Medical History:   Diagnosis Date    Cancer (HCC)     Headache     Hyperlipidemia     Shingles 07/2021    left arm    Trigeminal neuralgia of left side of face 2009     Past Surgical History:   Procedure Laterality Date    LYMPH NODE BIOPSY Left 12/13/2021    EXCISION LEFT AXILLARY LYMPH NODE  **LYMPHOMA PROTOCOL** performed by Bari Alvarenga MD at [facility] OR    PROSTATECTOMY Bilateral 2023    THYROIDECTOMY Left 09/12/2022    LEFT COLLINS THYROIDECTOMY performed by Randy Garcia MD at [facility]

## 2025-06-16 ENCOUNTER — OFFICE VISIT (OUTPATIENT)
Age: 65
End: 2025-06-16
Payer: OTHER GOVERNMENT

## 2025-06-16 ENCOUNTER — HOSPITAL ENCOUNTER (OUTPATIENT)
Dept: INFUSION THERAPY | Age: 65
Discharge: HOME | End: 2025-06-16
Payer: OTHER GOVERNMENT

## 2025-06-16 VITALS
BODY MASS INDEX: 26.24 KG/M2 | HEIGHT: 67 IN | TEMPERATURE: 97.7 F | DIASTOLIC BLOOD PRESSURE: 85 MMHG | WEIGHT: 167.2 LBS | SYSTOLIC BLOOD PRESSURE: 129 MMHG | HEART RATE: 52 BPM | OXYGEN SATURATION: 100 %

## 2025-06-16 DIAGNOSIS — C61 PROSTATE CANCER (HCC): Primary | ICD-10-CM

## 2025-06-16 DIAGNOSIS — C82.94 FOLLICULAR LYMPHOMA OF LYMPH NODES OF UPPER EXTREMITY, UNSPECIFIED GRADE (HCC): Primary | ICD-10-CM

## 2025-06-16 DIAGNOSIS — C73 THYROID CANCER (HCC): ICD-10-CM

## 2025-06-16 DIAGNOSIS — C82.94 FOLLICULAR LYMPHOMA OF LYMPH NODES OF UPPER EXTREMITY, UNSPECIFIED GRADE (HCC): ICD-10-CM

## 2025-06-16 LAB
ALBUMIN SERPL-MCNC: 3.8 G/DL (ref 3.5–5.2)
ALP SERPL-CCNC: 88 U/L (ref 40–129)
ALT SERPL-CCNC: 18 U/L (ref 0–50)
ANION GAP SERPL CALCULATED.3IONS-SCNC: 9 MMOL/L (ref 7–16)
AST SERPL-CCNC: 26 U/L (ref 0–50)
BASOPHILS # BLD: 0.03 K/UL (ref 0–0.2)
BASOPHILS NFR BLD: 1 % (ref 0–2)
BILIRUB SERPL-MCNC: 0.3 MG/DL (ref 0–1.2)
BUN SERPL-MCNC: 21 MG/DL (ref 8–23)
CALCIUM SERPL-MCNC: 8.9 MG/DL (ref 8.8–10.2)
CHLORIDE SERPL-SCNC: 106 MMOL/L (ref 98–107)
CO2 SERPL-SCNC: 24 MMOL/L (ref 22–29)
CREAT SERPL-MCNC: 0.9 MG/DL (ref 0.7–1.2)
EOSINOPHIL # BLD: 0.09 K/UL (ref 0.05–0.5)
EOSINOPHILS RELATIVE PERCENT: 2 % (ref 0–6)
ERYTHROCYTE [DISTWIDTH] IN BLOOD BY AUTOMATED COUNT: 12.3 % (ref 11.5–15)
GFR, ESTIMATED: >90 ML/MIN/1.73M2
GLUCOSE SERPL-MCNC: 98 MG/DL (ref 74–99)
HCT VFR BLD AUTO: 36.8 % (ref 37–54)
HGB BLD-MCNC: 12.8 G/DL (ref 12.5–16.5)
IMM GRANULOCYTES # BLD AUTO: <0.03 K/UL (ref 0–0.58)
IMM GRANULOCYTES NFR BLD: 0 % (ref 0–5)
LDH SERPL-CCNC: 161 U/L (ref 135–225)
LYMPHOCYTES NFR BLD: 0.66 K/UL (ref 1.5–4)
LYMPHOCYTES RELATIVE PERCENT: 17 % (ref 20–42)
MAGNESIUM SERPL-MCNC: 1.8 MG/DL (ref 1.6–2.4)
MCH RBC QN AUTO: 32 PG (ref 26–35)
MCHC RBC AUTO-ENTMCNC: 34.8 G/DL (ref 32–34.5)
MCV RBC AUTO: 92 FL (ref 80–99.9)
MONOCYTES NFR BLD: 0.48 K/UL (ref 0.1–0.95)
MONOCYTES NFR BLD: 13 % (ref 2–12)
NEUTROPHILS NFR BLD: 67 % (ref 43–80)
NEUTS SEG NFR BLD: 2.53 K/UL (ref 1.8–7.3)
PLATELET # BLD AUTO: 179 K/UL (ref 130–450)
PMV BLD AUTO: 10 FL (ref 7–12)
POTASSIUM SERPL-SCNC: 4 MMOL/L (ref 3.5–5.1)
PROT SERPL-MCNC: 6 G/DL (ref 6.4–8.3)
PSA SERPL-MCNC: <0.02 NG/ML (ref 0–4)
RBC # BLD AUTO: 4 M/UL (ref 3.8–5.8)
SODIUM SERPL-SCNC: 139 MMOL/L (ref 136–145)
WBC OTHER # BLD: 3.8 K/UL (ref 4.5–11.5)

## 2025-06-16 PROCEDURE — 3017F COLORECTAL CA SCREEN DOC REV: CPT | Performed by: INTERNAL MEDICINE

## 2025-06-16 PROCEDURE — 83735 ASSAY OF MAGNESIUM: CPT

## 2025-06-16 PROCEDURE — 1036F TOBACCO NON-USER: CPT | Performed by: INTERNAL MEDICINE

## 2025-06-16 PROCEDURE — 85025 COMPLETE CBC W/AUTO DIFF WBC: CPT

## 2025-06-16 PROCEDURE — G8419 CALC BMI OUT NRM PARAM NOF/U: HCPCS | Performed by: INTERNAL MEDICINE

## 2025-06-16 PROCEDURE — 83615 LACTATE (LD) (LDH) ENZYME: CPT

## 2025-06-16 PROCEDURE — G8427 DOCREV CUR MEDS BY ELIG CLIN: HCPCS | Performed by: INTERNAL MEDICINE

## 2025-06-16 PROCEDURE — 80053 COMPREHEN METABOLIC PANEL: CPT

## 2025-06-16 PROCEDURE — 36415 COLL VENOUS BLD VENIPUNCTURE: CPT

## 2025-06-16 PROCEDURE — 84153 ASSAY OF PSA TOTAL: CPT

## 2025-06-16 PROCEDURE — 99214 OFFICE O/P EST MOD 30 MIN: CPT | Performed by: INTERNAL MEDICINE

## 2025-06-16 RX ORDER — MIRABEGRON 25 MG/1
25 TABLET, FILM COATED, EXTENDED RELEASE ORAL DAILY
COMMUNITY

## 2025-06-16 NOTE — PROGRESS NOTES
[facility] MEDICAL ONCOLOGY  [address] [address]  Dept: [phone]  Loc: [phone]  Attending progress note      Reason for Visit:   Non-Hodgkin lymphoma.    Referring Physician:  Bari Alvarenga MD    PCP:  Britton Lopez MD    History of Present Illness:     Mr. Chappell is a pleasant 65 year old gentleman with a PMHx of trigeminal neuralgia who has been referred to us for follicular lymphoma. He initially had shingles in 7/2021 and felt a lymph node in his axillary area. He does not note progression of size or increase in number. Denies any weight loss, night sweats and low grade fevers. He had CT scan showed lymphadenopathy in the left axilla measuring 11-27 mm. 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 CD10 negative, [facility] has sent for FISH and molecular sequencing. Lab work shows normal kidney function, no anemia. He also had a colonoscopy done by Dr. 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 He underwent L hemithyroidectomy on 9/12/2022 by Dr. Garcia. Surgical pathology revealed Left thyroid, left hemithyroidectomy: Tumor size: 1.3 cm in greatest dimension. Papillary carcinoma, follicular variant, infiltrative; Carcinoma is present at an inked peripheral margin. Lymphocytic thyroiditis. ENT recommended for completion thyroidectomy as pathology revealed 1.3 cm size, + superior margin, no extrathyroidal extension.  Since his last visit, the patient was seen by Dr. Garcia and Dr. Modi, he underwent on 3/7/2023 a completion thyroidectomy.  He had on 5/5/2023 radioactive iodine

## (undated) DEVICE — PROBE 8225101 5PK STD PRASS FL TIP ROHS

## (undated) DEVICE — KIT SURG W7XL11IN 2 PKT UNTREATED NA

## (undated) DEVICE — MARKER,SKIN,WI/RULER AND LABELS: Brand: MEDLINE

## (undated) DEVICE — NDL CNTR 40CT FM MAG: Brand: MEDLINE INDUSTRIES, INC.

## (undated) DEVICE — BAG PRSS INFUS IV OR ART 3000 CC W STPCOCK NO THUMBWHEEL W

## (undated) DEVICE — E-Z CLEAN, NON-STICK, PTFE COATED, ELECTROSURGICAL BLADE ELECTRODE, MODIFIED EXTENDED INSULATION, 2.5 INCH (6.35 CM): Brand: MEGADYNE

## (undated) DEVICE — MARKER SURG SKIN FULL SZ PUR TRAD INK REGULAR ULTRA FN TWIN

## (undated) DEVICE — SPONGE,PEANUT,XRAY,ST,SM,3/8",5/CARD: Brand: MEDLINE INDUSTRIES, INC.

## (undated) DEVICE — GAUZE,SPONGE,4"X4",16PLY,STRL,LF,10/TRAY: Brand: MEDLINE

## (undated) DEVICE — Device

## (undated) DEVICE — GOWN,SIRUS,NONRNF,SETINSLV,XL,20/CS: Brand: MEDLINE

## (undated) DEVICE — GLOVE SURG SZ 65 THK91MIL LTX FREE SYN POLYISOPRENE

## (undated) DEVICE — COVER,LIGHT HANDLE,FLX,1/PK: Brand: MEDLINE INDUSTRIES, INC.

## (undated) DEVICE — 3M™ TEGADERM™ TRANSPARENT FILM DRESSING FRAME STYLE, 1626W, 4 IN X 4-3/4 IN (10 CM X 12 CM), 50/CT 4CT/CASE: Brand: 3M™ TEGADERM™

## (undated) DEVICE — DISSECTOR ENDOSCP L21CM TIP CURVATURE 40DEG FN CRV JAW VES

## (undated) DEVICE — PACK,LAPAROTOMY,NO GOWNS: Brand: MEDLINE

## (undated) DEVICE — ELECTRODE PT RET AD L9FT HI MOIST COND ADH HYDRGEL CORDED

## (undated) DEVICE — INTENDED FOR TISSUE SEPARATION, AND OTHER PROCEDURES THAT REQUIRE A SHARP SURGICAL BLADE TO PUNCTURE OR CUT.: Brand: BARD-PARKER ® STAINLESS STEEL BLADES

## (undated) DEVICE — STRIP,CLOSURE,WOUND,MEDI-STRIP,1/2X4: Brand: MEDLINE

## (undated) DEVICE — TOWEL,OR,DSP,ST,BLUE,STD,6/PK,12PK/CS: Brand: MEDLINE

## (undated) DEVICE — OPTIFOAM GENTLE EX, SACRUM, 9X9: Brand: MEDLINE

## (undated) DEVICE — HOOK RETRCT 12MM S STL BLNT E STAY LONE STAR

## (undated) DEVICE — NEEDLE HYPO 25GA L1.5IN BLU POLYPR HUB S STL REG BVL STR

## (undated) DEVICE — YANKAUER,BULB TIP,W/O VENT,RIGID,STERILE: Brand: MEDLINE

## (undated) DEVICE — HEAD AND NECK: Brand: MEDLINE INDUSTRIES, INC.

## (undated) DEVICE — MASTISOL ADHESIVE LIQ 2/3ML

## (undated) DEVICE — SYRINGE IRRIG 60ML SFT PLIABLE BLB EZ TO GRP 1 HND USE W/

## (undated) DEVICE — SYRINGE MED 10ML TRNSLUC BRL PLUNG BLK MRK POLYPR CTRL

## (undated) DEVICE — STAPLER SKIN L39MM DIA0.53MM CRWN 5.7MM S STL FIX HD PROX

## (undated) DEVICE — GAUZE,SPONGE,4"X4",16PLY,XRAY,STRL,LF: Brand: MEDLINE

## (undated) DEVICE — DOUBLE BASIN SET: Brand: MEDLINE INDUSTRIES, INC.

## (undated) DEVICE — GLOVE ORANGE PI 7 1/2   MSG9075

## (undated) DEVICE — APPLICATOR MEDICATED 26 CC SOLUTION HI LT ORNG CHLORAPREP

## (undated) DEVICE — PENCIL ES L3M BTTN SWCH HOLSTER W/ BLDE ELECTRD EDGE

## (undated) DEVICE — CORD,CAUTERY,BIPOLAR,STERILE: Brand: MEDLINE

## (undated) DEVICE — TUBING, SUCTION, 1/4" X 10', STRAIGHT: Brand: MEDLINE

## (undated) DEVICE — SET MAJOR INSTR HOUSE

## (undated) DEVICE — STRIP,CLOSURE,WOUND,MEDI-STRIP,1/4X3: Brand: MEDLINE